# Patient Record
Sex: FEMALE | Race: WHITE | URBAN - METROPOLITAN AREA
[De-identification: names, ages, dates, MRNs, and addresses within clinical notes are randomized per-mention and may not be internally consistent; named-entity substitution may affect disease eponyms.]

---

## 2017-09-22 ENCOUNTER — INPATIENT (INPATIENT)
Facility: HOSPITAL | Age: 82
LOS: 4 days | Discharge: HOME | DRG: 379 | End: 2017-09-27
Attending: INTERNAL MEDICINE | Admitting: INTERNAL MEDICINE
Payer: MEDICARE

## 2017-09-22 VITALS
RESPIRATION RATE: 18 BRPM | WEIGHT: 125 LBS | SYSTOLIC BLOOD PRESSURE: 173 MMHG | TEMPERATURE: 98 F | HEART RATE: 76 BPM | OXYGEN SATURATION: 96 % | HEIGHT: 62 IN | DIASTOLIC BLOOD PRESSURE: 77 MMHG

## 2017-09-22 RX ORDER — SODIUM CHLORIDE 9 MG/ML
1000 INJECTION INTRAMUSCULAR; INTRAVENOUS; SUBCUTANEOUS ONCE
Qty: 0 | Refills: 0 | Status: COMPLETED | OUTPATIENT
Start: 2017-09-22 | End: 2017-09-22

## 2017-09-22 RX ORDER — SODIUM CHLORIDE 9 MG/ML
3 INJECTION INTRAMUSCULAR; INTRAVENOUS; SUBCUTANEOUS ONCE
Qty: 0 | Refills: 0 | Status: COMPLETED | OUTPATIENT
Start: 2017-09-22 | End: 2017-09-22

## 2017-09-23 DIAGNOSIS — I48.91 UNSPECIFIED ATRIAL FIBRILLATION: ICD-10-CM

## 2017-09-23 DIAGNOSIS — I25.118 ATHEROSCLEROTIC HEART DISEASE OF NATIVE CORONARY ARTERY WITH OTHER FORMS OF ANGINA PECTORIS: ICD-10-CM

## 2017-09-23 DIAGNOSIS — I10 ESSENTIAL (PRIMARY) HYPERTENSION: ICD-10-CM

## 2017-09-23 DIAGNOSIS — K62.5 HEMORRHAGE OF ANUS AND RECTUM: ICD-10-CM

## 2017-09-23 DIAGNOSIS — K92.2 GASTROINTESTINAL HEMORRHAGE, UNSPECIFIED: ICD-10-CM

## 2017-09-23 DIAGNOSIS — K57.91 DIVERTICULOSIS OF INTESTINE, PART UNSPECIFIED, WITHOUT PERFORATION OR ABSCESS WITH BLEEDING: ICD-10-CM

## 2017-09-23 DIAGNOSIS — I25.10 ATHEROSCLEROTIC HEART DISEASE OF NATIVE CORONARY ARTERY WITHOUT ANGINA PECTORIS: ICD-10-CM

## 2017-09-23 DIAGNOSIS — E11.8 TYPE 2 DIABETES MELLITUS WITH UNSPECIFIED COMPLICATIONS: ICD-10-CM

## 2017-09-23 LAB
ALBUMIN SERPL ELPH-MCNC: 3.4 G/DL — SIGNIFICANT CHANGE UP (ref 3.3–5)
ALP SERPL-CCNC: 59 U/L — SIGNIFICANT CHANGE UP (ref 30–120)
ALT FLD-CCNC: 21 U/L DA — SIGNIFICANT CHANGE UP (ref 10–60)
ANION GAP SERPL CALC-SCNC: 11 MMOL/L — SIGNIFICANT CHANGE UP (ref 5–17)
ANION GAP SERPL CALC-SCNC: 9 MMOL/L — SIGNIFICANT CHANGE UP (ref 5–17)
APPEARANCE UR: CLEAR — SIGNIFICANT CHANGE UP
APTT BLD: 45.8 SEC — HIGH (ref 27.5–37.4)
AST SERPL-CCNC: 16 U/L — SIGNIFICANT CHANGE UP (ref 10–40)
BASOPHILS # BLD AUTO: 0.1 K/UL — SIGNIFICANT CHANGE UP (ref 0–0.2)
BASOPHILS NFR BLD AUTO: 1.5 % — SIGNIFICANT CHANGE UP (ref 0–2)
BILIRUB SERPL-MCNC: 0.4 MG/DL — SIGNIFICANT CHANGE UP (ref 0.2–1.2)
BILIRUB UR-MCNC: NEGATIVE — SIGNIFICANT CHANGE UP
BUN SERPL-MCNC: 27 MG/DL — HIGH (ref 7–23)
BUN SERPL-MCNC: 33 MG/DL — HIGH (ref 7–23)
CALCIUM SERPL-MCNC: 8.7 MG/DL — SIGNIFICANT CHANGE UP (ref 8.4–10.5)
CALCIUM SERPL-MCNC: 9.1 MG/DL — SIGNIFICANT CHANGE UP (ref 8.4–10.5)
CHLORIDE SERPL-SCNC: 104 MMOL/L — SIGNIFICANT CHANGE UP (ref 96–108)
CHLORIDE SERPL-SCNC: 107 MMOL/L — SIGNIFICANT CHANGE UP (ref 96–108)
CO2 SERPL-SCNC: 24 MMOL/L — SIGNIFICANT CHANGE UP (ref 22–31)
CO2 SERPL-SCNC: 25 MMOL/L — SIGNIFICANT CHANGE UP (ref 22–31)
COLOR SPEC: YELLOW — SIGNIFICANT CHANGE UP
CREAT SERPL-MCNC: 0.82 MG/DL — SIGNIFICANT CHANGE UP (ref 0.5–1.3)
CREAT SERPL-MCNC: 1.09 MG/DL — SIGNIFICANT CHANGE UP (ref 0.5–1.3)
DIFF PNL FLD: ABNORMAL
EOSINOPHIL # BLD AUTO: 0.2 K/UL — SIGNIFICANT CHANGE UP (ref 0–0.5)
EOSINOPHIL NFR BLD AUTO: 2 % — SIGNIFICANT CHANGE UP (ref 0–6)
GLUCOSE SERPL-MCNC: 137 MG/DL — HIGH (ref 70–99)
GLUCOSE SERPL-MCNC: 198 MG/DL — HIGH (ref 70–99)
GLUCOSE UR QL: NEGATIVE MG/DL — SIGNIFICANT CHANGE UP
HCT VFR BLD CALC: 33 % — LOW (ref 34.5–45)
HCT VFR BLD CALC: 34.3 % — LOW (ref 34.5–45)
HCT VFR BLD CALC: 36.2 % — SIGNIFICANT CHANGE UP (ref 34.5–45)
HCT VFR BLD CALC: 38.7 % — SIGNIFICANT CHANGE UP (ref 34.5–45)
HGB BLD-MCNC: 11.3 G/DL — LOW (ref 11.5–15.5)
HGB BLD-MCNC: 11.3 G/DL — LOW (ref 11.5–15.5)
HGB BLD-MCNC: 11.9 G/DL — SIGNIFICANT CHANGE UP (ref 11.5–15.5)
HGB BLD-MCNC: 12.7 G/DL — SIGNIFICANT CHANGE UP (ref 11.5–15.5)
INR BLD: 2.62 RATIO — HIGH (ref 0.88–1.16)
INR BLD: 2.79 RATIO — HIGH (ref 0.88–1.16)
KETONES UR-MCNC: NEGATIVE — SIGNIFICANT CHANGE UP
LEUKOCYTE ESTERASE UR-ACNC: ABNORMAL
LIDOCAIN IGE QN: 289 U/L — SIGNIFICANT CHANGE UP (ref 73–393)
LYMPHOCYTES # BLD AUTO: 2.9 K/UL — SIGNIFICANT CHANGE UP (ref 1–3.3)
LYMPHOCYTES # BLD AUTO: 33.9 % — SIGNIFICANT CHANGE UP (ref 13–44)
MAGNESIUM SERPL-MCNC: 2.1 MG/DL — SIGNIFICANT CHANGE UP (ref 1.6–2.6)
MCHC RBC-ENTMCNC: 31.4 PG — SIGNIFICANT CHANGE UP (ref 27–34)
MCHC RBC-ENTMCNC: 31.6 PG — SIGNIFICANT CHANGE UP (ref 27–34)
MCHC RBC-ENTMCNC: 31.7 PG — SIGNIFICANT CHANGE UP (ref 27–34)
MCHC RBC-ENTMCNC: 32.1 PG — SIGNIFICANT CHANGE UP (ref 27–34)
MCHC RBC-ENTMCNC: 32.7 GM/DL — SIGNIFICANT CHANGE UP (ref 32–36)
MCHC RBC-ENTMCNC: 32.9 GM/DL — SIGNIFICANT CHANGE UP (ref 32–36)
MCHC RBC-ENTMCNC: 33.1 GM/DL — SIGNIFICANT CHANGE UP (ref 32–36)
MCHC RBC-ENTMCNC: 34.2 GM/DL — SIGNIFICANT CHANGE UP (ref 32–36)
MCV RBC AUTO: 94 FL — SIGNIFICANT CHANGE UP (ref 80–100)
MCV RBC AUTO: 95.9 FL — SIGNIFICANT CHANGE UP (ref 80–100)
MCV RBC AUTO: 96 FL — SIGNIFICANT CHANGE UP (ref 80–100)
MCV RBC AUTO: 96.1 FL — SIGNIFICANT CHANGE UP (ref 80–100)
MONOCYTES # BLD AUTO: 0.6 K/UL — SIGNIFICANT CHANGE UP (ref 0–0.9)
MONOCYTES NFR BLD AUTO: 7.1 % — SIGNIFICANT CHANGE UP (ref 2–14)
NEUTROPHILS # BLD AUTO: 4.8 K/UL — SIGNIFICANT CHANGE UP (ref 1.8–7.4)
NEUTROPHILS NFR BLD AUTO: 55.4 % — SIGNIFICANT CHANGE UP (ref 43–77)
NITRITE UR-MCNC: NEGATIVE — SIGNIFICANT CHANGE UP
PH UR: 6.5 — SIGNIFICANT CHANGE UP (ref 5–8)
PLATELET # BLD AUTO: 230 K/UL — SIGNIFICANT CHANGE UP (ref 150–400)
PLATELET # BLD AUTO: 240 K/UL — SIGNIFICANT CHANGE UP (ref 150–400)
PLATELET # BLD AUTO: 254 K/UL — SIGNIFICANT CHANGE UP (ref 150–400)
PLATELET # BLD AUTO: 257 K/UL — SIGNIFICANT CHANGE UP (ref 150–400)
POTASSIUM SERPL-MCNC: 3.8 MMOL/L — SIGNIFICANT CHANGE UP (ref 3.5–5.3)
POTASSIUM SERPL-MCNC: 3.8 MMOL/L — SIGNIFICANT CHANGE UP (ref 3.5–5.3)
POTASSIUM SERPL-SCNC: 3.8 MMOL/L — SIGNIFICANT CHANGE UP (ref 3.5–5.3)
POTASSIUM SERPL-SCNC: 3.8 MMOL/L — SIGNIFICANT CHANGE UP (ref 3.5–5.3)
PROT SERPL-MCNC: 7 G/DL — SIGNIFICANT CHANGE UP (ref 6–8.3)
PROT UR-MCNC: 30 MG/DL
PROTHROM AB SERPL-ACNC: 29.1 SEC — HIGH (ref 9.8–12.7)
PROTHROM AB SERPL-ACNC: 31 SEC — HIGH (ref 9.8–12.7)
RBC # BLD: 3.51 M/UL — LOW (ref 3.8–5.2)
RBC # BLD: 3.57 M/UL — LOW (ref 3.8–5.2)
RBC # BLD: 3.76 M/UL — LOW (ref 3.8–5.2)
RBC # BLD: 4.04 M/UL — SIGNIFICANT CHANGE UP (ref 3.8–5.2)
RBC # FLD: 12 % — SIGNIFICANT CHANGE UP (ref 10.3–14.5)
RBC # FLD: 12.1 % — SIGNIFICANT CHANGE UP (ref 10.3–14.5)
RBC # FLD: 12.1 % — SIGNIFICANT CHANGE UP (ref 10.3–14.5)
RBC # FLD: 12.3 % — SIGNIFICANT CHANGE UP (ref 10.3–14.5)
SODIUM SERPL-SCNC: 139 MMOL/L — SIGNIFICANT CHANGE UP (ref 135–145)
SODIUM SERPL-SCNC: 141 MMOL/L — SIGNIFICANT CHANGE UP (ref 135–145)
SP GR SPEC: 1.01 — SIGNIFICANT CHANGE UP (ref 1.01–1.02)
UROBILINOGEN FLD QL: NEGATIVE MG/DL — SIGNIFICANT CHANGE UP
WBC # BLD: 8.3 K/UL — SIGNIFICANT CHANGE UP (ref 3.8–10.5)
WBC # BLD: 8.4 K/UL — SIGNIFICANT CHANGE UP (ref 3.8–10.5)
WBC # BLD: 8.6 K/UL — SIGNIFICANT CHANGE UP (ref 3.8–10.5)
WBC # BLD: 9.7 K/UL — SIGNIFICANT CHANGE UP (ref 3.8–10.5)
WBC # FLD AUTO: 8.3 K/UL — SIGNIFICANT CHANGE UP (ref 3.8–10.5)
WBC # FLD AUTO: 8.4 K/UL — SIGNIFICANT CHANGE UP (ref 3.8–10.5)
WBC # FLD AUTO: 8.6 K/UL — SIGNIFICANT CHANGE UP (ref 3.8–10.5)
WBC # FLD AUTO: 9.7 K/UL — SIGNIFICANT CHANGE UP (ref 3.8–10.5)

## 2017-09-23 PROCEDURE — 99223 1ST HOSP IP/OBS HIGH 75: CPT

## 2017-09-23 PROCEDURE — 99285 EMERGENCY DEPT VISIT HI MDM: CPT

## 2017-09-23 PROCEDURE — 71010: CPT | Mod: 26

## 2017-09-23 PROCEDURE — 93010 ELECTROCARDIOGRAM REPORT: CPT

## 2017-09-23 RX ORDER — METOPROLOL TARTRATE 50 MG
5 TABLET ORAL ONCE
Qty: 0 | Refills: 0 | Status: COMPLETED | OUTPATIENT
Start: 2017-09-23 | End: 2017-09-23

## 2017-09-23 RX ORDER — DEXTROSE 50 % IN WATER 50 %
25 SYRINGE (ML) INTRAVENOUS ONCE
Qty: 0 | Refills: 0 | Status: DISCONTINUED | OUTPATIENT
Start: 2017-09-23 | End: 2017-09-27

## 2017-09-23 RX ORDER — PANTOPRAZOLE SODIUM 20 MG/1
40 TABLET, DELAYED RELEASE ORAL EVERY 12 HOURS
Qty: 0 | Refills: 0 | Status: DISCONTINUED | OUTPATIENT
Start: 2017-09-23 | End: 2017-09-26

## 2017-09-23 RX ORDER — INSULIN LISPRO 100/ML
VIAL (ML) SUBCUTANEOUS AT BEDTIME
Qty: 0 | Refills: 0 | Status: DISCONTINUED | OUTPATIENT
Start: 2017-09-23 | End: 2017-09-27

## 2017-09-23 RX ORDER — INFLUENZA VIRUS VACCINE 15; 15; 15; 15 UG/.5ML; UG/.5ML; UG/.5ML; UG/.5ML
0.5 SUSPENSION INTRAMUSCULAR ONCE
Qty: 0 | Refills: 0 | Status: COMPLETED | OUTPATIENT
Start: 2017-09-23 | End: 2017-09-23

## 2017-09-23 RX ORDER — METOPROLOL TARTRATE 50 MG
5 TABLET ORAL EVERY 6 HOURS
Qty: 0 | Refills: 0 | Status: DISCONTINUED | OUTPATIENT
Start: 2017-09-23 | End: 2017-09-24

## 2017-09-23 RX ORDER — METOPROLOL TARTRATE 50 MG
25 TABLET ORAL DAILY
Qty: 0 | Refills: 0 | Status: DISCONTINUED | OUTPATIENT
Start: 2017-09-23 | End: 2017-09-23

## 2017-09-23 RX ORDER — INSULIN LISPRO 100/ML
VIAL (ML) SUBCUTANEOUS
Qty: 0 | Refills: 0 | Status: DISCONTINUED | OUTPATIENT
Start: 2017-09-23 | End: 2017-09-27

## 2017-09-23 RX ORDER — AMLODIPINE BESYLATE 2.5 MG/1
5 TABLET ORAL ONCE
Qty: 0 | Refills: 0 | Status: COMPLETED | OUTPATIENT
Start: 2017-09-23 | End: 2017-09-23

## 2017-09-23 RX ORDER — THYROID 120 MG
15 TABLET ORAL DAILY
Qty: 0 | Refills: 0 | Status: DISCONTINUED | OUTPATIENT
Start: 2017-09-23 | End: 2017-09-27

## 2017-09-23 RX ORDER — SODIUM CHLORIDE 9 MG/ML
1000 INJECTION, SOLUTION INTRAVENOUS
Qty: 0 | Refills: 0 | Status: DISCONTINUED | OUTPATIENT
Start: 2017-09-23 | End: 2017-09-27

## 2017-09-23 RX ORDER — DEXTROSE 50 % IN WATER 50 %
12.5 SYRINGE (ML) INTRAVENOUS ONCE
Qty: 0 | Refills: 0 | Status: DISCONTINUED | OUTPATIENT
Start: 2017-09-23 | End: 2017-09-27

## 2017-09-23 RX ORDER — LISINOPRIL 2.5 MG/1
10 TABLET ORAL DAILY
Qty: 0 | Refills: 0 | Status: DISCONTINUED | OUTPATIENT
Start: 2017-09-23 | End: 2017-09-23

## 2017-09-23 RX ORDER — GLUCAGON INJECTION, SOLUTION 0.5 MG/.1ML
1 INJECTION, SOLUTION SUBCUTANEOUS ONCE
Qty: 0 | Refills: 0 | Status: DISCONTINUED | OUTPATIENT
Start: 2017-09-23 | End: 2017-09-27

## 2017-09-23 RX ORDER — DEXTROSE 50 % IN WATER 50 %
1 SYRINGE (ML) INTRAVENOUS ONCE
Qty: 0 | Refills: 0 | Status: DISCONTINUED | OUTPATIENT
Start: 2017-09-23 | End: 2017-09-27

## 2017-09-23 RX ORDER — SODIUM CHLORIDE 9 MG/ML
1000 INJECTION INTRAMUSCULAR; INTRAVENOUS; SUBCUTANEOUS
Qty: 0 | Refills: 0 | Status: DISCONTINUED | OUTPATIENT
Start: 2017-09-23 | End: 2017-09-26

## 2017-09-23 RX ADMIN — Medication 5 MILLIGRAM(S): at 13:00

## 2017-09-23 RX ADMIN — SODIUM CHLORIDE 3 MILLILITER(S): 9 INJECTION INTRAMUSCULAR; INTRAVENOUS; SUBCUTANEOUS at 00:58

## 2017-09-23 RX ADMIN — Medication 2: at 12:35

## 2017-09-23 RX ADMIN — SODIUM CHLORIDE 1000 MILLILITER(S): 9 INJECTION INTRAMUSCULAR; INTRAVENOUS; SUBCUTANEOUS at 00:58

## 2017-09-23 RX ADMIN — SODIUM CHLORIDE 100 MILLILITER(S): 9 INJECTION INTRAMUSCULAR; INTRAVENOUS; SUBCUTANEOUS at 01:59

## 2017-09-23 RX ADMIN — Medication 5 MILLIGRAM(S): at 19:04

## 2017-09-23 RX ADMIN — Medication: at 01:15

## 2017-09-23 RX ADMIN — Medication 25 MILLIGRAM(S): at 08:58

## 2017-09-23 RX ADMIN — Medication 15 MILLIGRAM(S): at 06:06

## 2017-09-23 RX ADMIN — AMLODIPINE BESYLATE 5 MILLIGRAM(S): 2.5 TABLET ORAL at 02:48

## 2017-09-23 RX ADMIN — SODIUM CHLORIDE 100 MILLILITER(S): 9 INJECTION INTRAMUSCULAR; INTRAVENOUS; SUBCUTANEOUS at 16:57

## 2017-09-23 RX ADMIN — LISINOPRIL 10 MILLIGRAM(S): 2.5 TABLET ORAL at 06:06

## 2017-09-23 RX ADMIN — PANTOPRAZOLE SODIUM 40 MILLIGRAM(S): 20 TABLET, DELAYED RELEASE ORAL at 06:06

## 2017-09-23 RX ADMIN — PANTOPRAZOLE SODIUM 40 MILLIGRAM(S): 20 TABLET, DELAYED RELEASE ORAL at 19:30

## 2017-09-23 NOTE — CONSULT NOTE ADULT - PROBLEM SELECTOR RECOMMENDATION 9
will continue cardizem drip , will IV lopressor q 6 hours , if BP permits    AC on hold due bleeding will continue cardizem drip , will IV lopressor q 6 hours , if BP permits    will hold AC if hemoglobin drops significantly

## 2017-09-23 NOTE — ED PROVIDER NOTE - CHPI ED SYMPTOMS NEG
no burning urination/no dysuria/no nausea/no diarrhea/no fever/no abdominal distension/no chills/no vomiting

## 2017-09-23 NOTE — CONSULT NOTE ADULT - SUBJECTIVE AND OBJECTIVE BOX
REASON FOR CONSULT: Afib with rapid rate    CHIEF COMPLAINT: Lower gi bleeding     HPI:  This is an 84 F with PMH of HTN hypothyroid CAD 2003 PCI , chronic  a fib 2010  on coumadin diverticulosis who came in c/o rectal bleeding. Patient was in her usual state of health up until last when she was sitting and felt something wet but saw that she was passing blood clots rectally. She denies an abd pain, dizziness, lightheadedness, n/v/constipation, CP, HILLIARD, SOB, fevers and chills. She did feel palpitations at times yesterday. She reports having 6 small loose BMs on 9/19/17 (Tuesday) with scant blood. She attributed it to a stomach virus which her daughter and her grandchildren were having. Those symptoms had resolved by the next day. She has had some bleeding while she's been in the hospital, but less than at home. Her last colonoscopy was in 2009. She reports never being constipated and rarely straining. (23 Sep 2017 10:08). Patient blood work showed mild drop in hemoglobin , her heart rate increased  , received IV Cardizem, low normal BP  .  Patient denies any chest pain or shortness of breath , Patient had recent nuclear stress test  in june was normal as per patient   Dr brendan talavera       PAST MEDICAL & SURGICAL HISTORY:  Cancer of ureter  MI (myocardial infarction)  DM (diabetes mellitus)  Afib  Hypertension      Allergies    No Known Allergies    Intolerances        SOCIAL HISTORY: no smoking     FAMILY HISTORY: not contributory      MEDICATIONS:  MEDICATIONS  (STANDING):  lisinopril 10 milliGRAM(s) Oral daily  metoprolol succinate ER 25 milliGRAM(s) Oral daily  thyroid 15 milliGRAM(s) Oral daily  pantoprazole  Injectable 40 milliGRAM(s) IV Push every 12 hours  sodium chloride 0.9%. 1000 milliLiter(s) (100 mL/Hr) IV Continuous <Continuous>  insulin lispro (HumaLOG) corrective regimen sliding scale   SubCutaneous three times a day before meals  insulin lispro (HumaLOG) corrective regimen sliding scale   SubCutaneous at bedtime  dextrose 5%. 1000 milliLiter(s) (50 mL/Hr) IV Continuous <Continuous>  dextrose 50% Injectable 12.5 Gram(s) IV Push once  dextrose 50% Injectable 25 Gram(s) IV Push once  dextrose 50% Injectable 25 Gram(s) IV Push once  influenza   Vaccine 0.5 milliLiter(s) IntraMuscular once  diltiazem Infusion 5 mG/Hr (5 mL/Hr) IV Continuous <Continuous>    MEDICATIONS  (PRN):  dextrose Gel 1 Dose(s) Oral once PRN Blood Glucose LESS THAN 70 milliGRAM(s)/deciLiter  glucagon  Injectable 1 milliGRAM(s) IntraMuscular once PRN Glucose <70 milliGRAM(s)/deciLiter      REVIEW OF SYSTEMS:    CONSTITUTIONAL: No weakness, fevers or chills  EYES/ENT: No visual changes;  No vertigo or throat pain   NECK: No pain or stiffness  RESPIRATORY: No cough, wheezing, hemoptysis; No shortness of breath  CARDIOVASCULAR: No chest pain or palpitations  GASTROINTESTINAL: No abdominal or epigastric pain. No nausea, vomiting, or hematemesis; No diarrhea or constipation. No melena or hematochezia.  GENITOURINARY: No dysuria, frequency or hematuria  NEUROLOGICAL: No numbness or weakness  SKIN: No itching, burning, rashes, or lesions   All other review of systems is negative unless indicated above    Vital Signs Last 24 Hrs  T(C): 36.7 (23 Sep 2017 10:53), Max: 36.8 (23 Sep 2017 08:45)  T(F): 98.1 (23 Sep 2017 10:53), Max: 98.3 (23 Sep 2017 09:37)  HR: 83 (23 Sep 2017 10:53) (67 - 106)  BP: 98/62 (23 Sep 2017 10:53) (98/62 - 198/71)  BP(mean): --  RR: 18 (23 Sep 2017 10:53) (17 - 18)  SpO2: 100% (23 Sep 2017 10:53) (96% - 100%)    I&O's Summary    22 Sep 2017 07:01  -  23 Sep 2017 07:00  --------------------------------------------------------  IN: 600 mL / OUT: 0 mL / NET: 600 mL        PHYSICAL EXAM:    Constitutional: NAD, awake and alert, well-developed  HEENT: PERR, EOMI,  No oral cyananosis.  Neck:  supple,  No JVD  Respiratory: Breath sounds are clear bilaterally, No wheezing, rales or rhonchi  Cardiovascular: S1 and S2, irregularly irregular mild rapid ventricular rate   Gastrointestinal: Bowel Sounds present, soft, nontender.   Extremities: No peripheral edema. No clubbing or cyanosis.  Vascular: 2+ peripheral pulses  Neurological: A/O x 3, no focal deficits  Musculoskeletal: no calf tenderness.  Skin: No rashes.      LABS: All Labs Reviewed:                        11.3   9.7   )-----------( 230      ( 23 Sep 2017 06:31 )             34.3                         12.7   8.6   )-----------( 254      ( 23 Sep 2017 00:16 )             38.7     23 Sep 2017 06:31    141    |  107    |  27     ----------------------------<  137    3.8     |  25     |  0.82   23 Sep 2017 00:16    139    |  104    |  33     ----------------------------<  198    3.8     |  24     |  1.09     Ca    8.7        23 Sep 2017 06:31  Ca    9.1        23 Sep 2017 00:16  Mg     2.1       23 Sep 2017 06:31    TPro  7.0    /  Alb  3.4    /  TBili  0.4    /  DBili  x      /  AST  16     /  ALT  21     /  AlkPhos  59     23 Sep 2017 00:16    PT/INR - ( 23 Sep 2017 06:31 )   PT: 31.0 sec;   INR: 2.79 ratio         PTT - ( 23 Sep 2017 00:16 )  PTT:45.8 sec      Blood Culture:         RADIOLOGY/EKG:  Normal sinus rhythm  70 BPM ,    Monitor  afib with rapid ventricular rate

## 2017-09-23 NOTE — H&P ADULT - PROBLEM SELECTOR PLAN 1
admit to tele  clears  IVF  iv PPI  serial cbcs  transfuse prn  GI consult  hold asa and coumadin for now

## 2017-09-23 NOTE — CONSULT NOTE ADULT - SUBJECTIVE AND OBJECTIVE BOX
Chief Complaint:  Patient is a 84y old  Female who presents with a chief complaint of rectal bleeding (23 Sep 2017 10:08)      HPI:  84F with history of afib on coumadin (since ), cad s/p percutaneous coronary intervention in  on asa who presents with rectal bleeding  no prior history of significant gi bleed  last colonoscopy in   she has not passed any blood per rectum since admission  she is on cardizem gtt for rapid afib      Allergies:  No Known Allergies      Medications:  thyroid 15 milliGRAM(s) Oral daily  pantoprazole  Injectable 40 milliGRAM(s) IV Push every 12 hours  sodium chloride 0.9%. 1000 milliLiter(s) IV Continuous <Continuous>  insulin lispro (HumaLOG) corrective regimen sliding scale   SubCutaneous three times a day before meals  insulin lispro (HumaLOG) corrective regimen sliding scale   SubCutaneous at bedtime  dextrose 5%. 1000 milliLiter(s) IV Continuous <Continuous>  dextrose Gel 1 Dose(s) Oral once PRN  dextrose 50% Injectable 12.5 Gram(s) IV Push once  dextrose 50% Injectable 25 Gram(s) IV Push once  dextrose 50% Injectable 25 Gram(s) IV Push once  glucagon  Injectable 1 milliGRAM(s) IntraMuscular once PRN  influenza   Vaccine 0.5 milliLiter(s) IntraMuscular once  metoprolol Injectable 5 milliGRAM(s) IV Push every 6 hours  metoprolol Injectable 5 milliGRAM(s) IV Push once  diltiazem Infusion 2.5 mG/Hr IV Continuous <Continuous>      PMHX/PSHX:  Cancer of ureter  MI (myocardial infarction)  DM (diabetes mellitus)  Afib  Hypertension      Family history:      Social History:     ROS:     General:  No wt loss, fevers, chills, night sweats, fatigue,   Eyes:  Good vision, no reported pain  ENT:  No sore throat, pain, runny nose, dysphagia  CV:  No pain, palpitations, hypo/hypertension  Resp:  No dyspnea, cough, tachypnea, wheezing  GI:  No pain, No nausea, No vomiting, No diarrhea, No constipation, No weight loss, No fever, No pruritis, + rectal bleeding, No tarry stools, No dysphagia,  :  No pain, bleeding, incontinence, nocturia  Muscle:  No pain, weakness  Neuro:  No weakness, tingling, memory problems  Psych:  No fatigue, insomnia, mood problems, depression  Endocrine:  No polyuria, polydipsia, cold/heat intolerance  Heme:  No petechiae, ecchymosis, easy bruisability  Skin:  No rash, tattoos, scars, edema      PHYSICAL EXAM:   Vital Signs:  Vital Signs Last 24 Hrs  T(C): 36.6 (23 Sep 2017 12:03), Max: 36.8 (23 Sep 2017 08:45)  T(F): 97.9 (23 Sep 2017 12:03), Max: 98.3 (23 Sep 2017 09:37)  HR: 95 (23 Sep 2017 12:03) (67 - 106)  BP: 131/62 (23 Sep 2017 12:03) (98/62 - 198/71)  BP(mean): --  RR: 18 (23 Sep 2017 12:03) (17 - 18)  SpO2: 97% (23 Sep 2017 12:03) (96% - 100%)  Daily Height in cm: 157.48 (22 Sep 2017 23:47)    Daily Weight in k.9 (23 Sep 2017 01:45)    GENERAL:  Appears stated age, well-groomed, well-nourished, no distress  HEENT:  NC/AT,  conjunctivae clear and pink, no thyromegaly, nodules, adenopathy, no JVD, sclera -anicteric  CHEST:  Full & symmetric excursion, no increased effort, breath sounds clear  HEART:  Regular rhythm, S1, S2, no murmur/rub/S3/S4, no abdominal bruit, no edema  ABDOMEN:  Soft, non-tender, non-distended, normoactive bowel sounds,  no masses ,no hepato-splenomegaly, no signs of chronic liver disease  EXTEREMITIES:  no cyanosis,clubbing or edema  SKIN:  No rash/erythema/ecchymoses/petechiae/wounds/abscess/warm/dry  NEURO:  Alert, oriented, no asterixis, no tremor, no encephalopathy    LABS:                        11.9   8.3   )-----------( 240      ( 23 Sep 2017 11:46 )             36.2         141  |  107  |  27<H>  ----------------------------<  137<H>  3.8   |  25  |  0.82    Ca    8.7      23 Sep 2017 06:31  Mg     2.1         TPro  7.0  /  Alb  3.4  /  TBili  0.4  /  DBili  x   /  AST  16  /  ALT  21  /  AlkPhos  59      LIVER FUNCTIONS - ( 23 Sep 2017 00:16 )  Alb: 3.4 g/dL / Pro: 7.0 g/dL / ALK PHOS: 59 U/L / ALT: 21 U/L DA / AST: 16 U/L / GGT: x           PT/INR - ( 23 Sep 2017 06:31 )   PT: 31.0 sec;   INR: 2.79 ratio         PTT - ( 23 Sep 2017 00:16 )  PTT:45.8 sec  Urinalysis Basic - ( 23 Sep 2017 03:08 )    Color: Yellow / Appearance: Clear / S.010 / pH: x  Gluc: x / Ketone: Negative  / Bili: Negative / Urobili: Negative mg/dL   Blood: x / Protein: 30 mg/dL / Nitrite: Negative   Leuk Esterase: Small / RBC: 25-50 /HPF / WBC 3-5   Sq Epi: x / Non Sq Epi: x / Bacteria: Few      Amylase Serum--      Lipase [id number]       Ammonia--      Imaging:

## 2017-09-23 NOTE — H&P ADULT - GASTROINTESTINAL DETAILS
soft/no distention/nontender/normal/bowel sounds normal/no guarding/no rebound tenderness/no rigidity

## 2017-09-23 NOTE — CONSULT NOTE ADULT - PROBLEM SELECTOR RECOMMENDATION 9
continue clear liquid diet  hemoglobin appears stable  likely resolving diverticular bleed  will need colonoscopy once heart rate controlled and cleared by cardiology

## 2017-09-23 NOTE — ED PROVIDER NOTE - OBJECTIVE STATEMENT
83yo female bib ems with rectal bleeding. pt states she was sitting on the couch and felt something wet and had bloody BM with clots, no pain, no fever, chills, no vomiting, pt is on coumadin but has not had it checked in over a month

## 2017-09-23 NOTE — H&P ADULT - HISTORY OF PRESENT ILLNESS
This is an 84 F with PMH of HTN hypothyroid CAD a fib on coumadin diverticulosis who came in c/o rectal bleeding. Patient was in her usual state of health up until last when she was sitting and felt something wet but saw that she was passing blood clots rectally. She denies an abd pain, dizziness, lightheadedness, n/v/constipation, CP, HILLIARD, SOB, fevers and chills. She did feel palpitations at times yesterday. She reports having 6 small loose BMs on 9/19/17 (Tuesday) with scant blood. She attributed it to a stomach virus which her daughter and her grandchildren were having. Those symptoms had resolved by the next day. She has had some bleeding while she's been in the hospital, but less than at home. Her last colonoscopy was in 2009. She reports never being constipated and rarely straining.

## 2017-09-23 NOTE — CONSULT NOTE ADULT - PROBLEM SELECTOR RECOMMENDATION 3
hold coumadin  no need to reverse INR unless significant bleeding  colonoscopy once cleared by cardiology and off cardizem drip

## 2017-09-23 NOTE — CONSULT NOTE ADULT - PROBLEM SELECTOR RECOMMENDATION 2
without angina , normal recent stress test as per patient , stable ,    will obtain echo , stress test report from her cardiologist

## 2017-09-24 LAB
ANION GAP SERPL CALC-SCNC: 7 MMOL/L — SIGNIFICANT CHANGE UP (ref 5–17)
BUN SERPL-MCNC: 18 MG/DL — SIGNIFICANT CHANGE UP (ref 7–23)
CALCIUM SERPL-MCNC: 8.5 MG/DL — SIGNIFICANT CHANGE UP (ref 8.4–10.5)
CHLORIDE SERPL-SCNC: 110 MMOL/L — HIGH (ref 96–108)
CO2 SERPL-SCNC: 25 MMOL/L — SIGNIFICANT CHANGE UP (ref 22–31)
CREAT SERPL-MCNC: 0.93 MG/DL — SIGNIFICANT CHANGE UP (ref 0.5–1.3)
GLUCOSE SERPL-MCNC: 122 MG/DL — HIGH (ref 70–99)
HCT VFR BLD CALC: 33.7 % — LOW (ref 34.5–45)
HCT VFR BLD CALC: 35.9 % — SIGNIFICANT CHANGE UP (ref 34.5–45)
HGB BLD-MCNC: 11.2 G/DL — LOW (ref 11.5–15.5)
HGB BLD-MCNC: 11.5 G/DL — SIGNIFICANT CHANGE UP (ref 11.5–15.5)
INR BLD: 2.49 RATIO — HIGH (ref 0.88–1.16)
MCHC RBC-ENTMCNC: 31.2 PG — SIGNIFICANT CHANGE UP (ref 27–34)
MCHC RBC-ENTMCNC: 31.8 PG — SIGNIFICANT CHANGE UP (ref 27–34)
MCHC RBC-ENTMCNC: 32 GM/DL — SIGNIFICANT CHANGE UP (ref 32–36)
MCHC RBC-ENTMCNC: 33.1 GM/DL — SIGNIFICANT CHANGE UP (ref 32–36)
MCV RBC AUTO: 96.1 FL — SIGNIFICANT CHANGE UP (ref 80–100)
MCV RBC AUTO: 97.4 FL — SIGNIFICANT CHANGE UP (ref 80–100)
PLATELET # BLD AUTO: 236 K/UL — SIGNIFICANT CHANGE UP (ref 150–400)
PLATELET # BLD AUTO: 240 K/UL — SIGNIFICANT CHANGE UP (ref 150–400)
POTASSIUM SERPL-MCNC: 3.9 MMOL/L — SIGNIFICANT CHANGE UP (ref 3.5–5.3)
POTASSIUM SERPL-SCNC: 3.9 MMOL/L — SIGNIFICANT CHANGE UP (ref 3.5–5.3)
PROTHROM AB SERPL-ACNC: 27.6 SEC — HIGH (ref 9.8–12.7)
RBC # BLD: 3.51 M/UL — LOW (ref 3.8–5.2)
RBC # BLD: 3.68 M/UL — LOW (ref 3.8–5.2)
RBC # FLD: 12.2 % — SIGNIFICANT CHANGE UP (ref 10.3–14.5)
RBC # FLD: 12.5 % — SIGNIFICANT CHANGE UP (ref 10.3–14.5)
SODIUM SERPL-SCNC: 142 MMOL/L — SIGNIFICANT CHANGE UP (ref 135–145)
TROPONIN I SERPL-MCNC: 0.02 NG/ML — SIGNIFICANT CHANGE UP (ref 0.02–0.06)
WBC # BLD: 6.8 K/UL — SIGNIFICANT CHANGE UP (ref 3.8–10.5)
WBC # BLD: 7.2 K/UL — SIGNIFICANT CHANGE UP (ref 3.8–10.5)
WBC # FLD AUTO: 6.8 K/UL — SIGNIFICANT CHANGE UP (ref 3.8–10.5)
WBC # FLD AUTO: 7.2 K/UL — SIGNIFICANT CHANGE UP (ref 3.8–10.5)

## 2017-09-24 PROCEDURE — 99233 SBSQ HOSP IP/OBS HIGH 50: CPT

## 2017-09-24 RX ORDER — METOPROLOL TARTRATE 50 MG
2.5 TABLET ORAL EVERY 6 HOURS
Qty: 0 | Refills: 0 | Status: DISCONTINUED | OUTPATIENT
Start: 2017-09-24 | End: 2017-09-26

## 2017-09-24 RX ORDER — PHYTONADIONE (VIT K1) 5 MG
2.5 TABLET ORAL ONCE
Qty: 0 | Refills: 0 | Status: COMPLETED | OUTPATIENT
Start: 2017-09-24 | End: 2017-09-24

## 2017-09-24 RX ADMIN — Medication 2.5 MILLIGRAM(S): at 17:27

## 2017-09-24 RX ADMIN — PANTOPRAZOLE SODIUM 40 MILLIGRAM(S): 20 TABLET, DELAYED RELEASE ORAL at 17:27

## 2017-09-24 RX ADMIN — PANTOPRAZOLE SODIUM 40 MILLIGRAM(S): 20 TABLET, DELAYED RELEASE ORAL at 05:35

## 2017-09-24 RX ADMIN — Medication 15 MILLIGRAM(S): at 05:34

## 2017-09-24 RX ADMIN — SODIUM CHLORIDE 100 MILLILITER(S): 9 INJECTION INTRAMUSCULAR; INTRAVENOUS; SUBCUTANEOUS at 05:34

## 2017-09-24 RX ADMIN — Medication 2.5 MILLIGRAM(S): at 12:42

## 2017-09-25 LAB
HCT VFR BLD CALC: 35.5 % — SIGNIFICANT CHANGE UP (ref 34.5–45)
HGB BLD-MCNC: 11.6 G/DL — SIGNIFICANT CHANGE UP (ref 11.5–15.5)
INR BLD: 1.57 RATIO — HIGH (ref 0.88–1.16)
MCHC RBC-ENTMCNC: 31.9 PG — SIGNIFICANT CHANGE UP (ref 27–34)
MCHC RBC-ENTMCNC: 32.8 GM/DL — SIGNIFICANT CHANGE UP (ref 32–36)
MCV RBC AUTO: 97.5 FL — SIGNIFICANT CHANGE UP (ref 80–100)
PLATELET # BLD AUTO: 233 K/UL — SIGNIFICANT CHANGE UP (ref 150–400)
PROTHROM AB SERPL-ACNC: 17.3 SEC — HIGH (ref 9.8–12.7)
RBC # BLD: 3.64 M/UL — LOW (ref 3.8–5.2)
RBC # FLD: 12.2 % — SIGNIFICANT CHANGE UP (ref 10.3–14.5)
WBC # BLD: 6.6 K/UL — SIGNIFICANT CHANGE UP (ref 3.8–10.5)
WBC # FLD AUTO: 6.6 K/UL — SIGNIFICANT CHANGE UP (ref 3.8–10.5)

## 2017-09-25 PROCEDURE — 71010: CPT | Mod: 26

## 2017-09-25 PROCEDURE — 99233 SBSQ HOSP IP/OBS HIGH 50: CPT

## 2017-09-25 RX ORDER — SOD SULF/SODIUM/NAHCO3/KCL/PEG
1000 SOLUTION, RECONSTITUTED, ORAL ORAL EVERY 4 HOURS
Qty: 0 | Refills: 0 | Status: COMPLETED | OUTPATIENT
Start: 2017-09-25 | End: 2017-09-25

## 2017-09-25 RX ADMIN — PANTOPRAZOLE SODIUM 40 MILLIGRAM(S): 20 TABLET, DELAYED RELEASE ORAL at 06:46

## 2017-09-25 RX ADMIN — Medication 2.5 MILLIGRAM(S): at 17:21

## 2017-09-25 RX ADMIN — Medication 1000 MILLILITER(S): at 16:46

## 2017-09-25 RX ADMIN — Medication 20 MILLIGRAM(S): at 20:59

## 2017-09-25 RX ADMIN — Medication 1: at 08:12

## 2017-09-25 RX ADMIN — Medication 15 MILLIGRAM(S): at 06:46

## 2017-09-25 RX ADMIN — PANTOPRAZOLE SODIUM 40 MILLIGRAM(S): 20 TABLET, DELAYED RELEASE ORAL at 17:21

## 2017-09-25 RX ADMIN — Medication 2.5 MILLIGRAM(S): at 13:21

## 2017-09-26 ENCOUNTER — TRANSCRIPTION ENCOUNTER (OUTPATIENT)
Age: 82
End: 2017-09-26

## 2017-09-26 LAB
ALBUMIN SERPL ELPH-MCNC: 3.5 G/DL — SIGNIFICANT CHANGE UP (ref 3.3–5)
ALP SERPL-CCNC: 61 U/L — SIGNIFICANT CHANGE UP (ref 30–120)
ALT FLD-CCNC: 23 U/L DA — SIGNIFICANT CHANGE UP (ref 10–60)
ANION GAP SERPL CALC-SCNC: 12 MMOL/L — SIGNIFICANT CHANGE UP (ref 5–17)
AST SERPL-CCNC: 22 U/L — SIGNIFICANT CHANGE UP (ref 10–40)
BILIRUB SERPL-MCNC: 0.7 MG/DL — SIGNIFICANT CHANGE UP (ref 0.2–1.2)
BUN SERPL-MCNC: 9 MG/DL — SIGNIFICANT CHANGE UP (ref 7–23)
CALCIUM SERPL-MCNC: 8.6 MG/DL — SIGNIFICANT CHANGE UP (ref 8.4–10.5)
CHLORIDE SERPL-SCNC: 109 MMOL/L — HIGH (ref 96–108)
CO2 SERPL-SCNC: 25 MMOL/L — SIGNIFICANT CHANGE UP (ref 22–31)
CREAT SERPL-MCNC: 0.74 MG/DL — SIGNIFICANT CHANGE UP (ref 0.5–1.3)
GLUCOSE SERPL-MCNC: 134 MG/DL — HIGH (ref 70–99)
HCT VFR BLD CALC: 34.2 % — LOW (ref 34.5–45)
HGB BLD-MCNC: 11.5 G/DL — SIGNIFICANT CHANGE UP (ref 11.5–15.5)
INR BLD: 1.22 RATIO — HIGH (ref 0.88–1.16)
MCHC RBC-ENTMCNC: 32.6 PG — SIGNIFICANT CHANGE UP (ref 27–34)
MCHC RBC-ENTMCNC: 33.7 GM/DL — SIGNIFICANT CHANGE UP (ref 32–36)
MCV RBC AUTO: 96.9 FL — SIGNIFICANT CHANGE UP (ref 80–100)
PLATELET # BLD AUTO: 237 K/UL — SIGNIFICANT CHANGE UP (ref 150–400)
POTASSIUM SERPL-MCNC: 3.9 MMOL/L — SIGNIFICANT CHANGE UP (ref 3.5–5.3)
POTASSIUM SERPL-SCNC: 3.9 MMOL/L — SIGNIFICANT CHANGE UP (ref 3.5–5.3)
PROT SERPL-MCNC: 6.3 G/DL — SIGNIFICANT CHANGE UP (ref 6–8.3)
PROTHROM AB SERPL-ACNC: 13.4 SEC — HIGH (ref 9.8–12.7)
RBC # BLD: 3.53 M/UL — LOW (ref 3.8–5.2)
RBC # FLD: 12.2 % — SIGNIFICANT CHANGE UP (ref 10.3–14.5)
SODIUM SERPL-SCNC: 146 MMOL/L — HIGH (ref 135–145)
WBC # BLD: 8.3 K/UL — SIGNIFICANT CHANGE UP (ref 3.8–10.5)
WBC # FLD AUTO: 8.3 K/UL — SIGNIFICANT CHANGE UP (ref 3.8–10.5)

## 2017-09-26 PROCEDURE — 99232 SBSQ HOSP IP/OBS MODERATE 35: CPT

## 2017-09-26 RX ORDER — METOPROLOL TARTRATE 50 MG
50 TABLET ORAL EVERY 12 HOURS
Qty: 0 | Refills: 0 | Status: DISCONTINUED | OUTPATIENT
Start: 2017-09-26 | End: 2017-09-27

## 2017-09-26 RX ORDER — SODIUM CHLORIDE 9 MG/ML
1000 INJECTION, SOLUTION INTRAVENOUS
Qty: 0 | Refills: 0 | Status: DISCONTINUED | OUTPATIENT
Start: 2017-09-26 | End: 2017-09-26

## 2017-09-26 RX ORDER — PANTOPRAZOLE SODIUM 20 MG/1
40 TABLET, DELAYED RELEASE ORAL
Qty: 0 | Refills: 0 | Status: DISCONTINUED | OUTPATIENT
Start: 2017-09-26 | End: 2017-09-27

## 2017-09-26 RX ORDER — LISINOPRIL 2.5 MG/1
10 TABLET ORAL DAILY
Qty: 0 | Refills: 0 | Status: DISCONTINUED | OUTPATIENT
Start: 2017-09-26 | End: 2017-09-27

## 2017-09-26 RX ORDER — WARFARIN SODIUM 2.5 MG/1
5 TABLET ORAL ONCE
Qty: 0 | Refills: 0 | Status: COMPLETED | OUTPATIENT
Start: 2017-09-26 | End: 2017-09-26

## 2017-09-26 RX ORDER — PIOGLITAZONE HYDROCHLORIDE 15 MG/1
15 TABLET ORAL DAILY
Qty: 0 | Refills: 0 | Status: DISCONTINUED | OUTPATIENT
Start: 2017-09-26 | End: 2017-09-27

## 2017-09-26 RX ADMIN — Medication 2.5 MILLIGRAM(S): at 00:21

## 2017-09-26 RX ADMIN — Medication 2.5 MILLIGRAM(S): at 12:36

## 2017-09-26 RX ADMIN — Medication 2.5 MILLIGRAM(S): at 05:06

## 2017-09-26 RX ADMIN — PIOGLITAZONE HYDROCHLORIDE 15 MILLIGRAM(S): 15 TABLET ORAL at 17:18

## 2017-09-26 RX ADMIN — Medication 50 MILLIGRAM(S): at 17:18

## 2017-09-26 RX ADMIN — LISINOPRIL 10 MILLIGRAM(S): 2.5 TABLET ORAL at 17:18

## 2017-09-26 RX ADMIN — PANTOPRAZOLE SODIUM 40 MILLIGRAM(S): 20 TABLET, DELAYED RELEASE ORAL at 05:07

## 2017-09-26 RX ADMIN — WARFARIN SODIUM 5 MILLIGRAM(S): 2.5 TABLET ORAL at 20:54

## 2017-09-26 RX ADMIN — Medication 15 MILLIGRAM(S): at 05:07

## 2017-09-26 RX ADMIN — SODIUM CHLORIDE 100 MILLILITER(S): 9 INJECTION, SOLUTION INTRAVENOUS at 11:52

## 2017-09-26 NOTE — PROGRESS NOTE ADULT - PROBLEM SELECTOR PROBLEM 1
Atrial fibrillation with rapid ventricular response
Atrial fibrillation with rapid ventricular response
Gastrointestinal hemorrhage associated with anorectal source
Gastrointestinal hemorrhage associated with anorectal source
Gastrointestinal hemorrhage associated with intestinal diverticulosis
Gastrointestinal hemorrhage associated with intestinal diverticulosis
Atrial fibrillation with rapid ventricular response
Gastrointestinal hemorrhage associated with anorectal source

## 2017-09-26 NOTE — PROGRESS NOTE ADULT - PROBLEM SELECTOR PLAN 1
continue tele  clears  IVF  iv PPI  serial cbcs  transfuse prn  GI consult  hold asa and coumadin for now  h/h is stable  May proceed for colonoscopy once cleared by cardio
? etiology -- ? diveticular/anorectal disease/AVM/malignancy  continue clear liquid diet, npo past midnight for colonoscopy tomorrow morning  cardiology evaluation appreciated  monitor h/h, transfuse as needed  check INR in am
continue clears  cbc daily   INR remains elevated will order vitamin K 2.5mg times one now  hopefully it will improve and tentative colonoscopy on tuesday  cardiology evaluation appreciated
continue tele  clears  IVF  iv PPI  serial cbcs  transfuse prn  GI consult  hold asa and coumadin for now  h/h is stable  May proceed for colonoscopy once cleared by cardio
controlled rate , continue Iv lopressor , change to po once she starts po .  restart on anticoagulation after colonoscopy.        Patient is stable for low risk procedure
controlled rate ,with episodes of slow ventricular rate , will decrease lopressor to 2.5 mg        Patient is stable for low risk procedure
s/p colonoscopy -- poor prep, + ticks, + hemorrhoids, no active bleeding  po PPI  may resume coumadin  h/h is stable
controlled rate ,with episodes of slow ventricular rate , will decrease lopressor to 2.5 mg        Patient is stable for low risk procedure

## 2017-09-26 NOTE — PROGRESS NOTE ADULT - PROVIDER SPECIALTY LIST ADULT
Cardiology
Gastroenterology
Gastroenterology
Internal Medicine

## 2017-09-26 NOTE — PROGRESS NOTE ADULT - ASSESSMENT
This is an 84 F comes with BRBPR and MARLEE
This is an 84 F comes with BRBPR and MARLEE
cardiology PROBLEM:  Coronary artery disease with other form of angina pectoris: Coronary artery disease with other form of angina pectoris  Gastrointestinal hemorrhage associated with intestinal diverticulosis: Gastrointestinal hemorrhage associated with intestinal diverticulosis  GI bleed: GI bleed  Coronary artery disease: Coronary artery disease  Type 2 diabetes mellitus with complication, without long-term current use of insulin: Type 2 diabetes mellitus with complication, without long-term current use of insulin  Essential hypertension: Essential hypertension  Coronary artery disease, angina presence unspecified, unspecified vessel or lesion type, unspecified whether native or transplanted heart: Coronary artery disease, angina presence unspecified, unspecified vessel or lesion type, unspecified whether native or transplanted heart  Atrial fibrillation with rapid ventricular response: Atrial fibrillation with rapid ventricular response  Gastrointestinal hemorrhage associated with anorectal source: Gastrointestinal hemorrhage associated with anorectal source      Assessment      ·  Problem: Atrial fibrillation with rapid ventricular response.   controlled rate ,with episodes of slow ventricular rate    will decrease lopressor to 2.5 mg  Patient is stable for low risk procedure.  ·  Problem: Coronary artery disease.   NOo evidence of CHF   stable , without active symptoms.       ·  Problem: GI bleed.   stable hemoglobin level , as per GI.   colonoscopy pending  stable for procedure  low to intermediate risk  ARAGON SCORE <0.5%
This is an 84 F comes with BRBPR and MARLEE

## 2017-09-26 NOTE — DIETITIAN INITIAL EVALUATION ADULT. - OTHER INFO
85 y/o F c PMH of HTN, hypothyroidism, CAD, Afib, DM, diverticulosis admitted for rectal bleeding. Pt had a colonoscopy in the early afternoon which was incomplete 2/2 fecal matter present (states she was having diarrhea after the exam). Informed pt of FDI c coumadin and vitamin K to which she responded that she monitors her intake of greens/roughage. Pt also expressed that she is very conscious of her fat/cholesterol, sugar, and salt intake. S/p incomplete colonoscopy, pt advanced to DASH, TLC and intake should be monitored moving forward. Pt reports taking B12 & Vitamin D supplements PTA. Goal is for pt to consume >75% meals.

## 2017-09-26 NOTE — PROGRESS NOTE ADULT - PROBLEM SELECTOR PLAN 2
continue lopressor  cardio consult  cardizem prn  check tfts
continue lopressor  cardio consult  cardizem prn  check tfts
resume po meds  cardio consult  iv cardizem prn
stable , without active symptoms

## 2017-09-26 NOTE — PROCEDURE NOTE - ADDITIONAL PROCEDURE DETAILS
Findings:  - multiple small mouthed diverticula without active bleeding or old blood  - moderate sized internal hemorrhoids  - abundant solid fecal material  - incomplete colonoscopy    Plan:  - monitor h/h  - high fiber diet  - anusol supp.  - repeat colonoscopy as outpatient with extended preparation  - colorectal surgery evaluation for hemorrhoids as outpatient

## 2017-09-26 NOTE — PROGRESS NOTE ADULT - PROBLEM SELECTOR PLAN 3
hold asa for now  continue lopressor
hold asa for now  continue lopressor
resume po meds
stable hemoglobin level , as per GI

## 2017-09-26 NOTE — PROGRESS NOTE ADULT - SUBJECTIVE AND OBJECTIVE BOX
Patient is a 84y old  Female who presents with a chief complaint of rectal bleeding (23 Sep 2017 10:08)      INTERVAL HPI/OVERNIGHT EVENTS: Patient seen and examined. NAD. No complaints.      Vital Signs Last 24 Hrs  T(C): 36.3 (25 Sep 2017 10:10), Max: 36.8 (25 Sep 2017 05:00)  T(F): 97.4 (25 Sep 2017 10:10), Max: 98.3 (25 Sep 2017 05:00)  HR: 70 (25 Sep 2017 10:10) (60 - 77)  BP: 170/67 (25 Sep 2017 10:10) (144/57 - 173/66)  BP(mean): --  RR: 16 (25 Sep 2017 10:10) (16 - 18)  SpO2: 95% (25 Sep 2017 10:10) (95% - 98%)I&O's Summary    24 Sep 2017 07:01  -  25 Sep 2017 07:00  --------------------------------------------------------  IN: 1940 mL / OUT: 0 mL / NET: 1940 mL        LABS:                        11.6   6.6   )-----------( 233      ( 25 Sep 2017 07:12 )             35.5     09-24    142  |  110<H>  |  18  ----------------------------<  122<H>  3.9   |  25  |  0.93    Ca    8.5      24 Sep 2017 07:28      PT/INR - ( 25 Sep 2017 07:12 )   PT: 17.3 sec;   INR: 1.57 ratio             CAPILLARY BLOOD GLUCOSE  196 (25 Sep 2017 08:10)  124 (24 Sep 2017 21:01)  130 (24 Sep 2017 17:05)  121 (24 Sep 2017 12:02)              thyroid 15 milliGRAM(s) Oral daily  pantoprazole  Injectable 40 milliGRAM(s) IV Push every 12 hours  sodium chloride 0.9%. 1000 milliLiter(s) IV Continuous <Continuous>  insulin lispro (HumaLOG) corrective regimen sliding scale   SubCutaneous three times a day before meals  insulin lispro (HumaLOG) corrective regimen sliding scale   SubCutaneous at bedtime  dextrose 5%. 1000 milliLiter(s) IV Continuous <Continuous>  dextrose Gel 1 Dose(s) Oral once PRN  dextrose 50% Injectable 12.5 Gram(s) IV Push once  dextrose 50% Injectable 25 Gram(s) IV Push once  dextrose 50% Injectable 25 Gram(s) IV Push once  glucagon  Injectable 1 milliGRAM(s) IntraMuscular once PRN  influenza   Vaccine 0.5 milliLiter(s) IntraMuscular once  metoprolol Injectable 2.5 milliGRAM(s) IV Push every 6 hours      REVIEW OF SYSTEMS:  CONSTITUTIONAL: No fever, weight loss, or fatigue  NECK: No pain or stiffness  RESPIRATORY: No cough, wheezing, chills or hemoptysis; No shortness of breath  CARDIOVASCULAR: No chest pain, palpitations, dizziness, or leg swelling  GASTROINTESTINAL: No abdominal or epigastric pain. No nausea, vomiting, or hematemesis; No diarrhea or constipation. No melena or hematochezia.  GENITOURINARY: No dysuria, frequency, hematuria, or incontinence  NEUROLOGICAL: No headaches, loss of strength, numbness, or tremors  SKIN: No itching, burning  MUSCULOSKELETAL: No joint pain or swelling; No muscle, back, or extremity pain  PSYCHIATRIC: No depression, mood swings, HEME/LYMPH: No easy bruising, or bleeding gums  ALLERY AND IMMUNOLOGIC: No hives       Consultant(s) Notes Reviewed:  [x ] YES  [ ] NO    PHYSICAL EXAM:  GENERAL: NAD, well-groomed, well-developed  HEAD:  Atraumatic, Normocephalic  EYES: EOMI, PERRLA, conjunctiva and sclera clear  ENMT: No tonsillar erythema, exudates, or enlargement; Moist mucous membranes  NECK: Supple, No JVD  NERVOUS SYSTEM:  Awake & alert  CHEST/LUNG: Clear to auscultation bilaterally; No rales, rhonchi, wheezing,  HEART: Regular rate and rhythm  ABDOMEN: Soft, Nontender, Nondistended; Bowel sounds present  EXTREMITIES:  No clubbing, cyanosis, or edema  LYMPH: No lymphadenopathy noted  SKIN: No rashes      Advanced care planning discussed with patient/family [x ] YES   [ ] NO
Patient is a 84y old  Female who presents with a chief complaint of rectal bleeding (23 Sep 2017 10:08)      INTERVAL HPI/OVERNIGHT EVENTS: Patient seen and examined. NAD. No complaints.      Vital Signs Last 24 Hrs  T(C): 36.8 (26 Sep 2017 12:30), Max: 36.9 (25 Sep 2017 17:05)  T(F): 98.2 (26 Sep 2017 12:30), Max: 98.4 (25 Sep 2017 17:05)  HR: 68 (26 Sep 2017 12:30) (56 - 101)  BP: 170/64 (26 Sep 2017 12:30) (106/40 - 183/64)  BP(mean): --  RR: 16 (26 Sep 2017 12:30) (15 - 18)  SpO2: 98% (26 Sep 2017 12:30) (93% - 100%)I&O's Summary    25 Sep 2017 07:01  -  26 Sep 2017 07:00  --------------------------------------------------------  IN: 1200 mL / OUT: 0 mL / NET: 1200 mL    26 Sep 2017 07:01  -  26 Sep 2017 14:11  --------------------------------------------------------  IN: 820 mL / OUT: 0 mL / NET: 820 mL        LABS:                        11.5   8.3   )-----------( 237      ( 26 Sep 2017 07:27 )             34.2     09-26    146<H>  |  109<H>  |  9   ----------------------------<  134<H>  3.9   |  25  |  0.74    Ca    8.6      26 Sep 2017 08:15    TPro  6.3  /  Alb  3.5  /  TBili  0.7  /  DBili  x   /  AST  22  /  ALT  23  /  AlkPhos  61  09-26    PT/INR - ( 26 Sep 2017 07:27 )   PT: 13.4 sec;   INR: 1.22 ratio             CAPILLARY BLOOD GLUCOSE  146 (26 Sep 2017 12:00)  121 (26 Sep 2017 08:00)  121 (26 Sep 2017 00:39)  185 (25 Sep 2017 21:02)  114 (25 Sep 2017 17:05)              thyroid 15 milliGRAM(s) Oral daily  insulin lispro (HumaLOG) corrective regimen sliding scale   SubCutaneous three times a day before meals  insulin lispro (HumaLOG) corrective regimen sliding scale   SubCutaneous at bedtime  dextrose 5%. 1000 milliLiter(s) IV Continuous <Continuous>  dextrose Gel 1 Dose(s) Oral once PRN  dextrose 50% Injectable 12.5 Gram(s) IV Push once  dextrose 50% Injectable 25 Gram(s) IV Push once  dextrose 50% Injectable 25 Gram(s) IV Push once  glucagon  Injectable 1 milliGRAM(s) IntraMuscular once PRN  influenza   Vaccine 0.5 milliLiter(s) IntraMuscular once  metoprolol Injectable 2.5 milliGRAM(s) IV Push every 6 hours  pantoprazole    Tablet 40 milliGRAM(s) Oral before breakfast  warfarin 5 milliGRAM(s) Oral once      REVIEW OF SYSTEMS:  CONSTITUTIONAL: No fever, weight loss, or fatigue  NECK: No pain or stiffness  RESPIRATORY: No cough, wheezing, chills or hemoptysis; No shortness of breath  CARDIOVASCULAR: No chest pain, palpitations, dizziness, or leg swelling  GASTROINTESTINAL: No abdominal or epigastric pain. No nausea, vomiting, or hematemesis; No diarrhea or constipation. No melena or hematochezia.  GENITOURINARY: No dysuria, frequency, hematuria, or incontinence  NEUROLOGICAL: No headaches, loss of strength, numbness, or tremors  SKIN: No itching, burning  MUSCULOSKELETAL: No joint pain or swelling; No muscle, back, or extremity pain  PSYCHIATRIC: No depression, mood swings, HEME/LYMPH: No easy bruising, or bleeding gums  ALLERY AND IMMUNOLOGIC: No hives       Consultant(s) Notes Reviewed:  [ x] YES  [ ] NO    PHYSICAL EXAM:  GENERAL: NAD, well-groomed, well-developed  HEAD:  Atraumatic, Normocephalic  EYES: EOMI, PERRLA, conjunctiva and sclera clear  ENMT: No tonsillar erythema, exudates, or enlargement; Moist mucous membranes  NECK: Supple, No JVD  NERVOUS SYSTEM:  Awake & alert  CHEST/LUNG: Clear to auscultation bilaterally; No rales, rhonchi, wheezing,  HEART: Regular rate and rhythm  ABDOMEN: Soft, Nontender, Nondistended; Bowel sounds present  EXTREMITIES:  No clubbing, cyanosis, or edema  LYMPH: No lymphadenopathy noted  SKIN: No rashes      Advanced care planning discussed with patient/family [ x] YES   [ ] NO
INTERVAL HPI/OVERNIGHT EVENTS:  decreased quantity of blood in stools    MEDICATIONS  (STANDING):  thyroid 15 milliGRAM(s) Oral daily  pantoprazole  Injectable 40 milliGRAM(s) IV Push every 12 hours  sodium chloride 0.9%. 1000 milliLiter(s) (100 mL/Hr) IV Continuous <Continuous>  insulin lispro (HumaLOG) corrective regimen sliding scale   SubCutaneous three times a day before meals  insulin lispro (HumaLOG) corrective regimen sliding scale   SubCutaneous at bedtime  dextrose 5%. 1000 milliLiter(s) (50 mL/Hr) IV Continuous <Continuous>  dextrose 50% Injectable 12.5 Gram(s) IV Push once  dextrose 50% Injectable 25 Gram(s) IV Push once  dextrose 50% Injectable 25 Gram(s) IV Push once  influenza   Vaccine 0.5 milliLiter(s) IntraMuscular once  metoprolol Injectable 2.5 milliGRAM(s) IV Push every 6 hours  phytonadione   Solution 2.5 milliGRAM(s) Oral once    MEDICATIONS  (PRN):  dextrose Gel 1 Dose(s) Oral once PRN Blood Glucose LESS THAN 70 milliGRAM(s)/deciLiter  glucagon  Injectable 1 milliGRAM(s) IntraMuscular once PRN Glucose <70 milliGRAM(s)/deciLiter      Allergies    No Known Allergies    Intolerances        ROS:   General:  No wt loss, fevers, chills, night sweats, fatigue,   Eyes:  Good vision, no reported pain  ENT:  No sore throat, pain, runny nose, dysphagia  CV:  No pain, palpitations, hypo/hypertension  Resp:  No dyspnea, cough, tachypnea, wheezing  GI:  No pain, No nausea, No vomiting, No diarrhea, No constipation, No weight loss, No fever, No pruritis, + rectal bleeding, No tarry stools, No dysphagia,  :  No pain, bleeding, incontinence, nocturia  Muscle:  No pain, weakness  Neuro:  No weakness, tingling, memory problems  Psych:  No fatigue, insomnia, mood problems, depression  Endocrine:  No polyuria, polydipsia, cold/heat intolerance  Heme:  No petechiae, ecchymosis, easy bruisability  Skin:  No rash, tattoos, scars, edema      PHYSICAL EXAM:   Vital Signs:  Vital Signs Last 24 Hrs  T(C): 36.6 (24 Sep 2017 10:34), Max: 36.8 (23 Sep 2017 16:59)  T(F): 97.8 (24 Sep 2017 10:34), Max: 98.3 (23 Sep 2017 16:59)  HR: 62 (24 Sep 2017 10:34) (50 - 102)  BP: 143/61 (24 Sep 2017 10:34) (112/57 - 143/61)  BP(mean): --  RR: 18 (24 Sep 2017 10:34) (16 - 18)  SpO2: 100% (24 Sep 2017 10:34) (99% - 100%)  Daily     Daily     GENERAL:  Appears stated age, well-groomed, well-nourished, no distress  HEENT:  NC/AT,  conjunctivae clear and pink, no thyromegaly, nodules, adenopathy, no JVD, sclera -anicteric  CHEST:  Full & symmetric excursion, no increased effort, breath sounds clear  HEART:  Regular rhythm, S1, S2, no murmur/rub/S3/S4, no abdominal bruit, no edema  ABDOMEN:  Soft, non-tender, non-distended, normoactive bowel sounds,  no masses ,no hepato-splenomegaly, no signs of chronic liver disease  EXTEREMITIES:  no cyanosis,clubbing or edema  SKIN:  No rash/erythema/ecchymoses/petechiae/wounds/abscess/warm/dry  NEURO:  Alert, oriented, no asterixis, no tremor, no encephalopathy      LABS:                        11.5   6.8   )-----------( 236      ( 24 Sep 2017 07:28 )             35.9     09-24    142  |  110<H>  |  18  ----------------------------<  122<H>  3.9   |  25  |  0.93    Ca    8.5      24 Sep 2017 07:28  Mg     2.1     -    TPro  7.0  /  Alb  3.4  /  TBili  0.4  /  DBili  x   /  AST  16  /  ALT  21  /  AlkPhos  59  09-23    PT/INR - ( 24 Sep 2017 07:28 )   PT: 27.6 sec;   INR: 2.49 ratio         PTT - ( 23 Sep 2017 00:16 )  PTT:45.8 sec  Urinalysis Basic - ( 23 Sep 2017 03:08 )    Color: Yellow / Appearance: Clear / S.010 / pH: x  Gluc: x / Ketone: Negative  / Bili: Negative / Urobili: Negative mg/dL   Blood: x / Protein: 30 mg/dL / Nitrite: Negative   Leuk Esterase: Small / RBC: 25-50 /HPF / WBC 3-5   Sq Epi: x / Non Sq Epi: x / Bacteria: Few        RADIOLOGY & ADDITIONAL TESTS:
INTERVAL HPI/OVERNIGHT EVENTS:  still having blood in the stool  HR better controlled    MEDICATIONS  (STANDING):  thyroid 15 milliGRAM(s) Oral daily  pantoprazole  Injectable 40 milliGRAM(s) IV Push every 12 hours  sodium chloride 0.9%. 1000 milliLiter(s) (100 mL/Hr) IV Continuous <Continuous>  insulin lispro (HumaLOG) corrective regimen sliding scale   SubCutaneous three times a day before meals  insulin lispro (HumaLOG) corrective regimen sliding scale   SubCutaneous at bedtime  dextrose 5%. 1000 milliLiter(s) (50 mL/Hr) IV Continuous <Continuous>  dextrose 50% Injectable 12.5 Gram(s) IV Push once  dextrose 50% Injectable 25 Gram(s) IV Push once  dextrose 50% Injectable 25 Gram(s) IV Push once  influenza   Vaccine 0.5 milliLiter(s) IntraMuscular once  metoprolol Injectable 2.5 milliGRAM(s) IV Push every 6 hours  phytonadione   Solution 2.5 milliGRAM(s) Oral once    MEDICATIONS  (PRN):  dextrose Gel 1 Dose(s) Oral once PRN Blood Glucose LESS THAN 70 milliGRAM(s)/deciLiter  glucagon  Injectable 1 milliGRAM(s) IntraMuscular once PRN Glucose <70 milliGRAM(s)/deciLiter      Allergies    No Known Allergies    Intolerances        ROS:   General:  No wt loss, fevers, chills, night sweats, fatigue,   Eyes:  Good vision, no reported pain  ENT:  No sore throat, pain, runny nose, dysphagia  CV:  No pain, palpitations, hypo/hypertension  Resp:  No dyspnea, cough, tachypnea, wheezing  GI:  No pain, No nausea, No vomiting, No diarrhea, No constipation, No weight loss, No fever, No pruritis, + rectal bleeding, No tarry stools, No dysphagia,  :  No pain, bleeding, incontinence, nocturia  Muscle:  No pain, weakness  Neuro:  No weakness, tingling, memory problems  Psych:  No fatigue, insomnia, mood problems, depression  Endocrine:  No polyuria, polydipsia, cold/heat intolerance  Heme:  No petechiae, ecchymosis, easy bruisability  Skin:  No rash, tattoos, scars, edema      PHYSICAL EXAM:   Vital Signs:  Vital Signs Last 24 Hrs  T(C): 36.6 (24 Sep 2017 10:34), Max: 36.8 (23 Sep 2017 16:59)  T(F): 97.8 (24 Sep 2017 10:34), Max: 98.3 (23 Sep 2017 16:59)  HR: 62 (24 Sep 2017 10:34) (50 - 102)  BP: 143/61 (24 Sep 2017 10:34) (112/57 - 143/61)  BP(mean): --  RR: 18 (24 Sep 2017 10:34) (16 - 18)  SpO2: 100% (24 Sep 2017 10:34) (99% - 100%)  Daily     Daily     GENERAL:  Appears stated age, well-groomed, well-nourished, no distress  HEENT:  NC/AT,  conjunctivae clear and pink, no thyromegaly, nodules, adenopathy, no JVD, sclera -anicteric  CHEST:  Full & symmetric excursion, no increased effort, breath sounds clear  HEART:  Regular rhythm, S1, S2, no murmur/rub/S3/S4, no abdominal bruit, no edema  ABDOMEN:  Soft, non-tender, non-distended, normoactive bowel sounds,  no masses ,no hepato-splenomegaly, no signs of chronic liver disease  EXTEREMITIES:  no cyanosis,clubbing or edema  SKIN:  No rash/erythema/ecchymoses/petechiae/wounds/abscess/warm/dry  NEURO:  Alert, oriented, no asterixis, no tremor, no encephalopathy      LABS:                        11.5   6.8   )-----------( 236      ( 24 Sep 2017 07:28 )             35.9     09-24    142  |  110<H>  |  18  ----------------------------<  122<H>  3.9   |  25  |  0.93    Ca    8.5      24 Sep 2017 07:28  Mg     2.1     -    TPro  7.0  /  Alb  3.4  /  TBili  0.4  /  DBili  x   /  AST  16  /  ALT  21  /  AlkPhos  59  09-23    PT/INR - ( 24 Sep 2017 07:28 )   PT: 27.6 sec;   INR: 2.49 ratio         PTT - ( 23 Sep 2017 00:16 )  PTT:45.8 sec  Urinalysis Basic - ( 23 Sep 2017 03:08 )    Color: Yellow / Appearance: Clear / S.010 / pH: x  Gluc: x / Ketone: Negative  / Bili: Negative / Urobili: Negative mg/dL   Blood: x / Protein: 30 mg/dL / Nitrite: Negative   Leuk Esterase: Small / RBC: 25-50 /HPF / WBC 3-5   Sq Epi: x / Non Sq Epi: x / Bacteria: Few        RADIOLOGY & ADDITIONAL TESTS:
REASON FOR CONSULT: Afib with rapid rate    CHIEF COMPLAINT: Lower gi bleeding     HPI:  This is an 84 F with PMH of HTN hypothyroid CAD  PCI , chronic  a fib   on coumadin diverticulosis who came in c/o rectal bleeding.   Patient was in her usual state of health up until last when she was sitting and felt something wet but saw that she was passing blood clots rectally. She denies an abd pain, dizziness, lightheadedness, n/v/constipation, CP, HILLIARD, SOB, fevers and chills. She did feel palpitations at times yesterday.   She reports having 6 small loose BMs on 17 (Tuesday) with scant blood. She attributed it to a stomach virus which her daughter and her grandchildren were having. Those symptoms had resolved by the next day. She has had some bleeding while she's been in the hospital, but less than at home.   Her last colonoscopy was in . She reports never being constipated and rarely straining. (23 Sep 2017 10:08). Patient blood work showed mild drop in hemoglobin , her heart rate increased  , received IV Cardizem, low normal BP  .  Patient denies any chest pain or shortness of breath , Patient had recent nuclear stress test  in  was normal as per patient   Dr brendan talavera   17 Patient is feeling better , heart rate is improved , has bradycardic episodes , did not reciev morning dose lopressor.  Patient did pass blood tinged stool , hemoglobin stable       PAST MEDICAL & SURGICAL HISTORY:  Cancer of ureter  MI (myocardial infarction)  DM (diabetes mellitus)  Afib  Hypertension      Allergies    No Known Allergies      MEDICATIONS  (STANDING):  thyroid 15 milliGRAM(s) Oral daily  pantoprazole  Injectable 40 milliGRAM(s) IV Push every 12 hours  sodium chloride 0.9%. 1000 milliLiter(s) (100 mL/Hr) IV Continuous <Continuous>  insulin lispro (HumaLOG) corrective regimen sliding scale   SubCutaneous three times a day before meals  insulin lispro (HumaLOG) corrective regimen sliding scale   SubCutaneous at bedtime  dextrose 5%. 1000 milliLiter(s) (50 mL/Hr) IV Continuous <Continuous>  dextrose 50% Injectable 12.5 Gram(s) IV Push once  dextrose 50% Injectable 25 Gram(s) IV Push once  dextrose 50% Injectable 25 Gram(s) IV Push once  influenza   Vaccine 0.5 milliLiter(s) IntraMuscular once  metoprolol Injectable 5 milliGRAM(s) IV Push every 6 hours    MEDICATIONS  (PRN):  dextrose Gel 1 Dose(s) Oral once PRN Blood Glucose LESS THAN 70 milliGRAM(s)/deciLiter  glucagon  Injectable 1 milliGRAM(s) IntraMuscular once PRN Glucose <70 milliGRAM(s)/deciLiter      T(C): 36.8 (25 Sep 2017 05:00), Max: 36.8 (25 Sep 2017 05:00)  T(F): 98.3 (25 Sep 2017 05:00), Max: 98.3 (25 Sep 2017 05:00)  HR: 65 (25 Sep 2017 05:00) (60 - 77)  BP: 173/66 (25 Sep 2017 05:00) (143/61 - 173/66)  BP(mean): --  ABP: --  ABP(mean): --  RR: 16 (25 Sep 2017 05:00) (16 - 18)  SpO2: 98% (25 Sep 2017 05:00) (96% - 100%)      I&O's Summary    23 Sep 2017 07:01  -  24 Sep 2017 07:00  --------------------------------------------------------  IN: 0 mL / OUT: 3 mL / NET: -3 mL    24 Sep 2017 07:01  -  24 Sep 2017 12:11  --------------------------------------------------------  IN: 420 mL / OUT: 0 mL / NET: 420 mL            PHYSICAL EXAM:    Constitutional: NAD, awake and alert, well-developed  HEENT: PERR, EOMI,  No oral cyananosis.  Neck:  supple,  No JVD  Respiratory: Breath sounds are clear bilaterally, No wheezing, rales or rhonchi  Cardiovascular: S1 and S2, irregularly irregular controlled rate  Gastrointestinal: Bowel Sounds present, soft, nontender.   Extremities: No peripheral edema. No clubbing or cyanosis.  Vascular: 2+ peripheral pulses  Neurological: A/O x 3, no focal deficits  Musculoskeletal: no calf tenderness.  Skin: No rashes.      LABS: All Labs Reviewed:                                    11.5   6.8   )-----------( 236      ( 24 Sep 2017 07:28 )             35.9     -    142  |  110<H>  |  18  ----------------------------<  122<H>  3.9   |  25  |  0.93    Ca    8.5      24 Sep 2017 07:28  Mg     2.1         TPro  7.0  /  Alb  3.4  /  TBili  0.4  /  DBili  x   /  AST  16  /  ALT  21  /  AlkPhos  59      CARDIAC MARKERS ( 24 Sep 2017 07:28 )  .018 ng/mL / x     / x     / x     / x          LIVER FUNCTIONS - ( 23 Sep 2017 00:16 )  Alb: 3.4 g/dL / Pro: 7.0 g/dL / ALK PHOS: 59 U/L / ALT: 21 U/L DA / AST: 16 U/L / GGT: x           PT/INR - ( 24 Sep 2017 07:28 )   PT: 27.6 sec;   INR: 2.49 ratio         PTT - ( 23 Sep 2017 00:16 )  PTT:45.8 sec  Urinalysis Basic - ( 23 Sep 2017 03:08 )    Color: Yellow / Appearance: Clear / S.010 / pH: x  Gluc: x / Ketone: Negative  / Bili: Negative / Urobili: Negative mg/dL   Blood: x / Protein: 30 mg/dL / Nitrite: Negative   Leuk Esterase: Small / RBC: 25-50 /HPF / WBC 3-5   Sq Epi: x / Non Sq Epi: x / Bacteria: Few            RADIOLOGY/EKG:  Normal sinus rhythm  70 BPM ,    Monitor  afib with rapid ventricular rate
REASON FOR CONSULT: Afib with rapid rate    CHIEF COMPLAINT: Lower gi bleeding     HPI:  This is an 84 F with PMH of HTN hypothyroid CAD  PCI , chronic  a fib   on coumadin diverticulosis who came in c/o rectal bleeding. Patient was in her usual state of health up until last when she was sitting and felt something wet but saw that she was passing blood clots rectally. She denies an abd pain, dizziness, lightheadedness, n/v/constipation, CP, HILLIARD, SOB, fevers and chills. She did feel palpitations at times yesterday. She reports having 6 small loose BMs on 17 (Tuesday) with scant blood. She attributed it to a stomach virus which her daughter and her grandchildren were having. Those symptoms had resolved by the next day. She has had some bleeding while she's been in the hospital, but less than at home. Her last colonoscopy was in . She reports never being constipated and rarely straining. (23 Sep 2017 10:08). Patient blood work showed mild drop in hemoglobin , her heart rate increased  , received IV Cardizem, low normal BP  .  Patient denies any chest pain or shortness of breath , Patient had recent nuclear stress test  in  was normal as per patient   Dr brendan talavera   17 Patient is feeling better , heart rate is improved , has bradycardic episodes , did not reciev morning dose lopressor.  Patient did pass blood tinged stool , hemoglobin stable       PAST MEDICAL & SURGICAL HISTORY:  Cancer of ureter  MI (myocardial infarction)  DM (diabetes mellitus)  Afib  Hypertension      Allergies    No Known Allergies    Intolerances      MEDICATIONS  (STANDING):  thyroid 15 milliGRAM(s) Oral daily  pantoprazole  Injectable 40 milliGRAM(s) IV Push every 12 hours  sodium chloride 0.9%. 1000 milliLiter(s) (100 mL/Hr) IV Continuous <Continuous>  insulin lispro (HumaLOG) corrective regimen sliding scale   SubCutaneous three times a day before meals  insulin lispro (HumaLOG) corrective regimen sliding scale   SubCutaneous at bedtime  dextrose 5%. 1000 milliLiter(s) (50 mL/Hr) IV Continuous <Continuous>  dextrose 50% Injectable 12.5 Gram(s) IV Push once  dextrose 50% Injectable 25 Gram(s) IV Push once  dextrose 50% Injectable 25 Gram(s) IV Push once  influenza   Vaccine 0.5 milliLiter(s) IntraMuscular once  metoprolol Injectable 5 milliGRAM(s) IV Push every 6 hours    MEDICATIONS  (PRN):  dextrose Gel 1 Dose(s) Oral once PRN Blood Glucose LESS THAN 70 milliGRAM(s)/deciLiter  glucagon  Injectable 1 milliGRAM(s) IntraMuscular once PRN Glucose <70 milliGRAM(s)/deciLiter      Vital Signs Last 24 Hrs  T(C): 36.6 (24 Sep 2017 10:34), Max: 36.8 (23 Sep 2017 16:59)  T(F): 97.8 (24 Sep 2017 10:34), Max: 98.3 (23 Sep 2017 16:59)  HR: 62 (24 Sep 2017 10:34) (50 - 102)  BP: 143/61 (24 Sep 2017 10:34) (112/57 - 143/61)  BP(mean): --  RR: 18 (24 Sep 2017 10:34) (16 - 18)  SpO2: 100% (24 Sep 2017 10:34) (99% - 100%)    I&O's Summary    23 Sep 2017 07:01  -  24 Sep 2017 07:00  --------------------------------------------------------  IN: 0 mL / OUT: 3 mL / NET: -3 mL    24 Sep 2017 07:01  -  24 Sep 2017 12:11  --------------------------------------------------------  IN: 420 mL / OUT: 0 mL / NET: 420 mL            PHYSICAL EXAM:    Constitutional: NAD, awake and alert, well-developed  HEENT: PERR, EOMI,  No oral cyananosis.  Neck:  supple,  No JVD  Respiratory: Breath sounds are clear bilaterally, No wheezing, rales or rhonchi  Cardiovascular: S1 and S2, irregularly irregular controlled rate  Gastrointestinal: Bowel Sounds present, soft, nontender.   Extremities: No peripheral edema. No clubbing or cyanosis.  Vascular: 2+ peripheral pulses  Neurological: A/O x 3, no focal deficits  Musculoskeletal: no calf tenderness.  Skin: No rashes.      LABS: All Labs Reviewed:                                    11.5   6.8   )-----------( 236      ( 24 Sep 2017 07:28 )             35.9     09-24    142  |  110<H>  |  18  ----------------------------<  122<H>  3.9   |  25  |  0.93    Ca    8.5      24 Sep 2017 07:28  Mg     2.1     -    TPro  7.0  /  Alb  3.4  /  TBili  0.4  /  DBili  x   /  AST  16  /  ALT  21  /  AlkPhos  59  23    CARDIAC MARKERS ( 24 Sep 2017 07:28 )  .018 ng/mL / x     / x     / x     / x          LIVER FUNCTIONS - ( 23 Sep 2017 00:16 )  Alb: 3.4 g/dL / Pro: 7.0 g/dL / ALK PHOS: 59 U/L / ALT: 21 U/L DA / AST: 16 U/L / GGT: x           PT/INR - ( 24 Sep 2017 07:28 )   PT: 27.6 sec;   INR: 2.49 ratio         PTT - ( 23 Sep 2017 00:16 )  PTT:45.8 sec  Urinalysis Basic - ( 23 Sep 2017 03:08 )    Color: Yellow / Appearance: Clear / S.010 / pH: x  Gluc: x / Ketone: Negative  / Bili: Negative / Urobili: Negative mg/dL   Blood: x / Protein: 30 mg/dL / Nitrite: Negative   Leuk Esterase: Small / RBC: 25-50 /HPF / WBC 3-5   Sq Epi: x / Non Sq Epi: x / Bacteria: Few            RADIOLOGY/EKG:  Normal sinus rhythm  70 BPM ,    Monitor  afib with rapid ventricular rate
REASON FOR CONSULT: Afib with rapid rate    CHIEF COMPLAINT: Lower gi bleeding     HPI:  This is an 84 F with PMH of HTN hypothyroid CAD 2003 PCI , chronic  a fib 2010  on coumadin diverticulosis who came in c/o rectal bleeding. Patient was in her usual state of health up until last when she was sitting and felt something wet but saw that she was passing blood clots rectally. She denies an abd pain, dizziness, lightheadedness, n/v/constipation, CP, HILLIARD, SOB, fevers and chills. She did feel palpitations at times yesterday. She reports having 6 small loose BMs on 9/19/17 (Tuesday) with scant blood. She attributed it to a stomach virus which her daughter and her grandchildren were having. Those symptoms had resolved by the next day. She has had some bleeding while she's been in the hospital, but less than at home. Her last colonoscopy was in 2009. She reports never being constipated and rarely straining. (23 Sep 2017 10:08). Patient blood work showed mild drop in hemoglobin , her heart rate increased  , received IV Cardizem, low normal BP  .  Patient denies any chest pain or shortness of breath , Patient had recent nuclear stress test  in june was normal as per patient   Dr brendan talavera   9/24/17 Patient is feeling better , heart rate is improved , has bradycardic episodes , did not reciev morning dose lopressor.  Patient did pass blood tinged stool , hemoglobin stable   9/26 Patient is feeling fine ,very anxious , hypertensive , heart rate is controlled , patient being taken for colonoscopy       PAST MEDICAL & SURGICAL HISTORY:  Cancer of ureter  MI (myocardial infarction)  DM (diabetes mellitus)  Afib  Hypertension      MEDICATIONS  (STANDING):  thyroid 15 milliGRAM(s) Oral daily  pantoprazole  Injectable 40 milliGRAM(s) IV Push every 12 hours  sodium chloride 0.9%. 1000 milliLiter(s) (100 mL/Hr) IV Continuous <Continuous>  insulin lispro (HumaLOG) corrective regimen sliding scale   SubCutaneous three times a day before meals  insulin lispro (HumaLOG) corrective regimen sliding scale   SubCutaneous at bedtime  dextrose 5%. 1000 milliLiter(s) (50 mL/Hr) IV Continuous <Continuous>  dextrose 50% Injectable 12.5 Gram(s) IV Push once  dextrose 50% Injectable 25 Gram(s) IV Push once  dextrose 50% Injectable 25 Gram(s) IV Push once  influenza   Vaccine 0.5 milliLiter(s) IntraMuscular once  metoprolol Injectable 2.5 milliGRAM(s) IV Push every 6 hours    MEDICATIONS  (PRN):  dextrose Gel 1 Dose(s) Oral once PRN Blood Glucose LESS THAN 70 milliGRAM(s)/deciLiter  glucagon  Injectable 1 milliGRAM(s) IntraMuscular once PRN Glucose <70 milliGRAM(s)/deciLiter      Vital Signs Last 24 Hrs  T(C): 36.8 (26 Sep 2017 09:23), Max: 36.9 (25 Sep 2017 17:05)  T(F): 98.2 (26 Sep 2017 09:23), Max: 98.4 (25 Sep 2017 17:05)  HR: 78 (26 Sep 2017 09:23) (62 - 101)  BP: 183/64 (26 Sep 2017 09:23) (155/61 - 183/64)  BP(mean): --  RR: 18 (26 Sep 2017 09:23) (16 - 18)  SpO2: 100% (26 Sep 2017 09:23) (96% - 100%)              PHYSICAL EXAM:    Constitutional: NAD, awake and alert, well-developed  HEENT: PERR, EOMI,  No oral cyananosis.  Neck:  supple,  No JVD  Respiratory: Breath sounds are clear bilaterally, No wheezing, rales or rhonchi  Cardiovascular: S1 and S2, irregularly irregular controlled rate  Gastrointestinal: Bowel Sounds present, soft, nontender.   Extremities: No peripheral edema. No clubbing or cyanosis.  Vascular: 2+ peripheral pulses  Neurological: A/O x 3, no focal deficits  Musculoskeletal: no calf tenderness.  Skin: No rashes.      LABS: All Labs Reviewed:                                                    11.5   8.3   )-----------( 237      ( 26 Sep 2017 07:27 )             34.2     09-26    146<H>  |  109<H>  |  9   ----------------------------<  134<H>  3.9   |  25  |  0.74    Ca    8.6      26 Sep 2017 08:15    TPro  6.3  /  Alb  3.5  /  TBili  0.7  /  DBili  x   /  AST  22  /  ALT  23  /  AlkPhos  61  09-26        LIVER FUNCTIONS - ( 26 Sep 2017 08:15 )  Alb: 3.5 g/dL / Pro: 6.3 g/dL / ALK PHOS: 61 U/L / ALT: 23 U/L DA / AST: 22 U/L / GGT: x           PT/INR - ( 26 Sep 2017 07:27 )   PT: 13.4 sec;   INR: 1.22 ratio                         RADIOLOGY/EKG:  Normal sinus rhythm  70 BPM ,    Monitor  afib with rapid ventricular rate
Patient is a 84y old  Female who presents with a chief complaint of rectal bleeding (23 Sep 2017 10:08)      INTERVAL HPI/OVERNIGHT EVENTS: Patient seen and examined. NAD. No complaints. Small amount of bleeding.      Vital Signs Last 24 Hrs  T(C): 36.6 (24 Sep 2017 05:00), Max: 36.8 (23 Sep 2017 16:59)  T(F): 97.9 (24 Sep 2017 05:00), Max: 98.3 (23 Sep 2017 16:59)  HR: 50 (24 Sep 2017 05:00) (50 - 102)  BP: 141/58 (24 Sep 2017 05:00) (98/62 - 141/58)  BP(mean): --  RR: 18 (24 Sep 2017 05:00) (16 - 18)  SpO2: 100% (24 Sep 2017 05:00) (97% - 100%)I&O's Summary    23 Sep 2017 07:01  -  24 Sep 2017 07:00  --------------------------------------------------------  IN: 0 mL / OUT: 3 mL / NET: -3 mL    24 Sep 2017 07:01  -  24 Sep 2017 09:55  --------------------------------------------------------  IN: 420 mL / OUT: 0 mL / NET: 420 mL        LABS:                        11.5   6.8   )-----------( 236      ( 24 Sep 2017 07:28 )             35.9     09-24    142  |  110<H>  |  18  ----------------------------<  122<H>  3.9   |  25  |  0.93    Ca    8.5      24 Sep 2017 07:28  Mg     2.1     -    TPro  7.0  /  Alb  3.4  /  TBili  0.4  /  DBili  x   /  AST  16  /  ALT  21  /  AlkPhos  59  09-23    PT/INR - ( 24 Sep 2017 07:28 )   PT: 27.6 sec;   INR: 2.49 ratio         PTT - ( 23 Sep 2017 00:16 )  PTT:45.8 sec  Urinalysis Basic - ( 23 Sep 2017 03:08 )    Color: Yellow / Appearance: Clear / S.010 / pH: x  Gluc: x / Ketone: Negative  / Bili: Negative / Urobili: Negative mg/dL   Blood: x / Protein: 30 mg/dL / Nitrite: Negative   Leuk Esterase: Small / RBC: 25-50 /HPF / WBC 3-5   Sq Epi: x / Non Sq Epi: x / Bacteria: Few      CAPILLARY BLOOD GLUCOSE  138 (24 Sep 2017 07:47)  122 (23 Sep 2017 21:00)  137 (23 Sep 2017 16:59)  209 (23 Sep 2017 11:57)              thyroid 15 milliGRAM(s) Oral daily  pantoprazole  Injectable 40 milliGRAM(s) IV Push every 12 hours  sodium chloride 0.9%. 1000 milliLiter(s) IV Continuous <Continuous>  insulin lispro (HumaLOG) corrective regimen sliding scale   SubCutaneous three times a day before meals  insulin lispro (HumaLOG) corrective regimen sliding scale   SubCutaneous at bedtime  dextrose 5%. 1000 milliLiter(s) IV Continuous <Continuous>  dextrose Gel 1 Dose(s) Oral once PRN  dextrose 50% Injectable 12.5 Gram(s) IV Push once  dextrose 50% Injectable 25 Gram(s) IV Push once  dextrose 50% Injectable 25 Gram(s) IV Push once  glucagon  Injectable 1 milliGRAM(s) IntraMuscular once PRN  influenza   Vaccine 0.5 milliLiter(s) IntraMuscular once  metoprolol Injectable 5 milliGRAM(s) IV Push every 6 hours      REVIEW OF SYSTEMS:  CONSTITUTIONAL: No fever, weight loss, or fatigue  NECK: No pain or stiffness  RESPIRATORY: No cough, wheezing, chills or hemoptysis; No shortness of breath  CARDIOVASCULAR: No chest pain, palpitations, dizziness, or leg swelling  GASTROINTESTINAL: No abdominal or epigastric pain. No nausea, vomiting, or hematemesis; No diarrhea or constipation. No melena, + hematochezia.  GENITOURINARY: No dysuria, frequency, hematuria, or incontinence  NEUROLOGICAL: No headaches, loss of strength, numbness, or tremors  SKIN: No itching, burning  MUSCULOSKELETAL: No joint pain or swelling; No muscle, back, or extremity pain  PSYCHIATRIC: No depression, mood swings, HEME/LYMPH: No easy bruising, or bleeding gums  ALLERY AND IMMUNOLOGIC: No hives       Consultant(s) Notes Reviewed:  [x ] YES  [ ] NO    PHYSICAL EXAM:  GENERAL: NAD, well-groomed, well-developed  HEAD:  Atraumatic, Normocephalic  EYES: EOMI, PERRLA, conjunctiva and sclera clear  ENMT: No tonsillar erythema, exudates, or enlargement; Moist mucous membranes  NECK: Supple, No JVD  NERVOUS SYSTEM:  Awake & alert  CHEST/LUNG: Clear to auscultation bilaterally; No rales, rhonchi, wheezing,  HEART: IRRegular rate and rhythm  ABDOMEN: Soft, Nontender, Nondistended; Bowel sounds present  EXTREMITIES:  No clubbing, cyanosis, or edema  LYMPH: No lymphadenopathy noted  SKIN: No rashes      Advanced care planning discussed with patient/family [x ] YES   [ ] NO

## 2017-09-26 NOTE — PROGRESS NOTE ADULT - PROBLEM SELECTOR PROBLEM 3
Coronary artery disease, angina presence unspecified, unspecified vessel or lesion type, unspecified whether native or transplanted heart
GI bleed

## 2017-09-26 NOTE — PROGRESS NOTE ADULT - PROBLEM SELECTOR PROBLEM 5
Type 2 diabetes mellitus with complication, without long-term current use of insulin

## 2017-09-27 ENCOUNTER — TRANSCRIPTION ENCOUNTER (OUTPATIENT)
Age: 82
End: 2017-09-27

## 2017-09-27 VITALS
OXYGEN SATURATION: 98 % | HEART RATE: 65 BPM | RESPIRATION RATE: 17 BRPM | TEMPERATURE: 98 F | SYSTOLIC BLOOD PRESSURE: 139 MMHG | DIASTOLIC BLOOD PRESSURE: 65 MMHG

## 2017-09-27 LAB
INR BLD: 1.18 RATIO — HIGH (ref 0.88–1.16)
PROTHROM AB SERPL-ACNC: 12.9 SEC — HIGH (ref 9.8–12.7)

## 2017-09-27 RX ORDER — HYDROCORTISONE 1 %
1 OINTMENT (GRAM) TOPICAL
Qty: 30 | Refills: 0
Start: 2017-09-27

## 2017-09-27 RX ORDER — HYDROCORTISONE 1 %
1 OINTMENT (GRAM) TOPICAL DAILY
Qty: 0 | Refills: 0 | Status: DISCONTINUED | OUTPATIENT
Start: 2017-09-27 | End: 2017-09-27

## 2017-09-27 RX ORDER — PANTOPRAZOLE SODIUM 20 MG/1
1 TABLET, DELAYED RELEASE ORAL
Qty: 30 | Refills: 0
Start: 2017-09-27 | End: 2017-10-27

## 2017-09-27 RX ADMIN — PANTOPRAZOLE SODIUM 40 MILLIGRAM(S): 20 TABLET, DELAYED RELEASE ORAL at 06:04

## 2017-09-27 RX ADMIN — Medication 15 MILLIGRAM(S): at 06:04

## 2017-09-27 RX ADMIN — Medication 50 MILLIGRAM(S): at 06:07

## 2017-09-27 RX ADMIN — LISINOPRIL 10 MILLIGRAM(S): 2.5 TABLET ORAL at 08:09

## 2017-09-27 NOTE — DISCHARGE NOTE ADULT - CARE PROVIDER_API CALL
Primary Care Physician and Cardiologist,   Phone: (   )    -  Fax: (   )    - Primary Care Physician and Cardiologist,   Phone: (   )    -  Fax: (   )    -    Robin Walker), Internal Medicine  [address] [place] [address]  Phone: [phone]  Fax: [phone]

## 2017-09-27 NOTE — DISCHARGE NOTE ADULT - SECONDARY DIAGNOSIS.
Chronic atrial fibrillation Essential hypertension Type 2 diabetes mellitus with complication, without long-term current use of insulin

## 2017-09-27 NOTE — DISCHARGE NOTE ADULT - CARE PLAN
Principal Discharge DX:	Gastrointestinal hemorrhage associated with anorectal source  Goal:	.  Instructions for follow-up, activity and diet:	Continue anusol suppositories daily. Follow-up with GI for repeat colonoscopy.  Secondary Diagnosis:	Chronic atrial fibrillation  Instructions for follow-up, activity and diet:	Continue current medications  Follow-up with your cardiologist within 1 week.  Check your coumadin level (INR) on Monday, October 2.  Secondary Diagnosis:	Essential hypertension  Instructions for follow-up, activity and diet:	Continue current medications  Secondary Diagnosis:	Type 2 diabetes mellitus with complication, without long-term current use of insulin  Instructions for follow-up, activity and diet:	Continue current medications

## 2017-09-27 NOTE — DISCHARGE NOTE ADULT - PLAN OF CARE
. Continue anusol suppositories daily. Follow-up with GI for repeat colonoscopy. Continue current medications  Follow-up with your cardiologist within 1 week.  Check your coumadin level (INR) on Monday, October 2. Continue current medications

## 2017-09-27 NOTE — DISCHARGE NOTE ADULT - MEDICATION SUMMARY - MEDICATIONS TO TAKE
I will START or STAY ON the medications listed below when I get home from the hospital:    aspirin 81 mg oral tablet  -- 1 tab(s) by mouth once a day  -- Indication: For Coronary artery disease    ramipril 10 mg oral capsule  -- 1 cap(s) by mouth once a day  -- Indication: For Essential hypertension    warfarin 5 mg oral tablet  -- 4x per week  -- Indication: For Chronic atrial fibrillation    warfarin 2.5 mg oral tablet  -- 3x/week  -- Indication: For Chronic atrial fibrillation    Actos 15 mg oral tablet  -- 1 tab(s) by mouth once a day  -- Indication: For Type 2 diabetes mellitus with complication, without long-term current use of insulin    simvastatin 20 mg oral tablet  -- 1 tab(s) by mouth once a day (at bedtime)  -- Indication: For Hyperlipidemia    Toprol-XL 50 mg oral tablet, extended release  -- orally 2 times a day  -- Indication: For Essential hypertension    amLODIPine 5 mg oral tablet  -- 1 tab(s) by mouth once a day  -- Indication: For Essential hypertension    hydrocortisone 25 mg rectal suppository  -- 1 suppository(ies) rectally once a day  -- Indication: For Gastrointestinal hemorrhage associated with anorectal source    pantoprazole 40 mg oral delayed release tablet  -- 1 tab(s) by mouth once a day (before a meal)  -- Indication: For Gastrointestinal hemorrhage    Birney Thyroid 15 mg oral tablet  -- 1 tab(s) by mouth once a day  -- Indication: For Hypothyroid

## 2017-09-27 NOTE — DISCHARGE NOTE ADULT - HOSPITAL COURSE
This is an 84 F with PMH of HTN hypothyroid CAD a fib on coumadin diverticulosis who came in c/o rectal bleeding. Patient was in her usual state of health up until last when she was sitting and felt something wet but saw that she was passing blood clots rectally. She denies an abd pain, dizziness, lightheadedness, n/v/constipation, CP, HILLIARD, SOB, fevers and chills. She did feel palpitations at times yesterday. She reports having 6 small loose BMs on 9/19/17 (Tuesday) with scant blood. She attributed it to a stomach virus which her daughter and her grandchildren were having. Those symptoms had resolved by the next day. She has had some bleeding while she's been in the hospital, but less than at home. Her last colonoscopy was in 2009. She reports never being constipated and rarely straining.    Patient had scant bleeding in the hospital. Her h/h remained stable and she did not require PRBC. She did have episode of MARLEE for which she was treated with iv cardizem. Colonoscopy: poor prep, + ticks, + internal hemorrhoids. Bleed might have been a diverticular bleed or a hemorrhoidal bleed. There was no active bleeding seen. She will require a require a repeat colonoscopy in the near future to better visualize the remainder of her colon.     She is now rate controlled on po meds. Doing well. Tolerating diet. No bleed. She has resumed her coumadin. INR check in 5 days. Will go home on PPI and anusol suppositories.

## 2017-09-27 NOTE — DISCHARGE NOTE ADULT - PROVIDER TOKENS
FREE:[LAST:[Primary Care Physician and Cardiologist],PHONE:[(   )    -],FAX:[(   )    -]] FREE:[LAST:[Primary Care Physician and Cardiologist],PHONE:[(   )    -],FAX:[(   )    -]],TOKEN:'[id number]'

## 2017-09-27 NOTE — DISCHARGE NOTE ADULT - PATIENT PORTAL LINK FT
“You can access the [facility] Patient Portal, offered by [facility], by registering with the following website: [email or web address]”

## 2017-10-19 PROCEDURE — 80053 COMPREHEN METABOLIC PANEL: CPT

## 2017-10-19 PROCEDURE — 93005 ELECTROCARDIOGRAM TRACING: CPT

## 2017-10-19 PROCEDURE — 84484 ASSAY OF TROPONIN QUANT: CPT

## 2017-10-19 PROCEDURE — 83735 ASSAY OF MAGNESIUM: CPT

## 2017-10-19 PROCEDURE — 85610 PROTHROMBIN TIME: CPT

## 2017-10-19 PROCEDURE — 83690 ASSAY OF LIPASE: CPT

## 2017-10-19 PROCEDURE — 85730 THROMBOPLASTIN TIME PARTIAL: CPT

## 2017-10-19 PROCEDURE — 99285 EMERGENCY DEPT VISIT HI MDM: CPT | Mod: 25

## 2017-10-19 PROCEDURE — 81001 URINALYSIS AUTO W/SCOPE: CPT

## 2017-10-19 PROCEDURE — 85027 COMPLETE CBC AUTOMATED: CPT

## 2017-10-19 PROCEDURE — 80048 BASIC METABOLIC PNL TOTAL CA: CPT

## 2017-10-19 PROCEDURE — 71045 X-RAY EXAM CHEST 1 VIEW: CPT

## 2019-02-19 NOTE — PRE-OP CHECKLIST - AICD PRESENT
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 Patient has underwent  ex-lap, supracervical hysterectomy, bilateral salpingo-oophorectomy, omentectomy and tumor debulking on 2015.  She completed six cycles of carboplatin and Taxane chemotherapy, all completed on  2016. S/p Pelvic mass resection and radical trachelectomy, omentectomy on 16 followed by carboplatin/gemzar/avastin, she completed 5 cycles with the last cycle in 2017. She completed 3 cycles of doxil 2017 for recurrent disease. Now  s/p  pelvic RT  completed 2018. 1 more cycle doxil completed 3/2018 and patient had difficulty tolerating doxil (side effects including fatigue,  dyspnea with any excretion,  changes to  her stools including small stools , pain with both voiding and defecating,  issues with reflux, pain to hands, feet and legs.) Patient was to start Zejula on  but did not receive medication yet because of insurance issues. She then transferred care to [facility] and received additional chemotherapy with her last course in 2018.      She presented on  direct admission from her primary care physician office yesterday for evaluation of lower abdominal pain patient has been hospitalized in October for similar complaints and patient at that time found to have hydronephrosis and had a stent  placement by urology for obstructive uropathy. Patient is set to see Dr Luciano for metallic stent placement on the . Most recent CT is from 10/2018 with mild to moderate HN.  She  denies fevers, chills, nausea, vomiting, dysuria, or hematuria.Patient adamantly refuses PCN placement.  She states that she experiences vaginal bleeding intermittent for the past several months.  Sometimes the bleeding is heavy like a period and other times there is no bleeding or very minimal spotting.  She experiences some vaginal pressure and cramps prior to her bleeding episodes and thinks its the result of increased activity or bending.   She denies any vaginal bleeding fo the past three days.  She states she almost always notices some bleeding when she urinates.  She needs to wear a diaper due to urinary incontinences.  She has been working with palliative care for some time now and is in the process of setting up home hospice.     Denies fevers/chills. Denies dysuria. Has issues with urinary frequency and bladder spasms since radiation, no new changes. Denies CP, SOB, leg pain.       REVIEW OF SYSTEMS:  A 14-point review of systems was performed with any pertinent positives noted in the History of Present Illness and otherwise negative.    PAST MEDICAL HISTORY:  Fibromyalgia, obesity with a BMI of 38, chronic low back pain, internal varicose veins in her legs.    PAST SURGICAL HISTORY:  , prior shoulder and hand surgeries, ex-lap, supracervical hysterectomy, bilateral salpingo-oophorectomy and debulking as above. cholecystectomy.     ALLERGIES:  Taxol allergy.    CURRENT MEDICATIONS:    Medications (11) Active  Scheduled: (7)  Gabapentin 400 mg cap  400 mg 1 cap, Oral, Q6H  Lactobacillus acidophilus tab  1 tab, Oral, Daily  Multivitamin tab  1 tab, Oral, Daily  OxyCODONE 40 mg CR tab  40 mg 1 tab, Oral, Q12H  Pantoprazole 40 mg DR tab  40 mg 1 tab, Oral, BID  Polyethylene glycol 3350 oral recon powder 17 gm packet UD   17 gm 1 packet, Oral, Daily  Senna-docusate sodium 8.6-50 mg tab  2 tab, Oral, Q Bedtime  Continuous: (1)  Sodium Chloride 0.9% 1,000 mL  1,000 mL, IV, 60 mL/hr  PRN: (3)  ALPRAZolam 0.25 mg tab  0.5 mg 2 tab, Oral, Q12H  Ondansetron 4 mg/2 mL inj SDV  4 mg 2 mL, Slow IV Push, Q6H  OxyCODONE 15 mg IR tab  15 mg 1 tab, Oral, Q3H      OB/GYN H/ISTORY:  G3, P3 0-0-3 with two full-term spontaneous vaginal deliveries and one .  No history of abnormal Pap smears.  Menarche at age 12.  No history of sexually transmitted infections.     FAMILY HISTORY:  No family members with any breast, colon, endometrial or ovarian cancers.  The patient's BRCA testing is negative, mother recently      SOCIAL HISTORY:  The patient is  and lives with her .  She does not work.  She denies tobacco, alcohol or illicit drug use.    PREVENTATIVE SCREENING:  All performed through Dr. Holder.     PHYSICAL EXAMINATION:      Vitals between:   2019 13:38:40   TO   2019 13:38:40                   LAST RESULT MINIMUM MAXIMUM  Temperature 37.1 36.8 37.4  Heart Rate 81 75 82  Respiratory Rate 18 15 18  NISBP           113 102 113  NIDBP           76 58 76  SpO2                    98 94 99      Gen: NAD, AAOx3  HEENT:  Normocephalic, atraumatic.  Extraocular muscles are intact.  Lymph Nodes:  Negative cervical, supraclavicular and inguinal nodes.   Lungs:  Clear to auscultation bilaterally.    Cardiac Exam:  Regular rate and rhythm.  S1, S2 present.    Abdomen:  Obese, soft,  nondistended nontender with well-healed surgical incisions and no palpable masses or ascites. Some firmness along midline incision at the level of the umbilicus, likely scar tissue.  Skin: no rashes, warm and dry  Back:  TTP in mid and lower right back, CVA tenderness   Extremities:No clubbing or cyanosis.     Pelvic Exam: normal appearing external genitalia, upon placement of the speculum there is no bleeding or discharge or lesions  noted.  The inferior portion of the tumor at the vaginal cuff is appreciated and appears slightly friable.       DATA :     CT C/A/P 10/18/2016:   FINDINGS: There is an area of chronic scarring within the anterior inferior right upper lobe.  There is stable minimal scarring within the medial left lower lung.  Within the left costophrenic angle there is a stable 2.6 cm noncalcified subpleural nodule.  On image number 20 of series number three there is a stable 3 mm pleural-based nodule adjacent to the minor fissure.  Remainder the lungs are clear.  The trachea and major bronchi are patent.   The demonstrated portion of the thyroid gland is unremarkable.  The heart size is normal.  The aorta is of normal caliber.  There are a few atherosclerotic calcifications noted within the thoracic aorta.  There are a few non-pathologically enlarged lymph nodes in within the mediastinum.  These are unchanged.  The liver, spleen, pancreas, adrenal glands and kidneys are normal in appearance.  The contrast-filled loops of bowel are unremarkable in appearance.  The aorta and iliac arteries are of normal caliber demonstrating scattered atherosclerotic calcifications.  In the pelvis, there is been interval development 3.0 x 3.6 cm soft tissue mass that lies posterior to either the vaginal cuff or uterus and just to the left of the distal sigmoid colon.  Correlation with the patient's surgical history is suggested.  This is suspicious for recurrence or metastatic disease.  Multiple surgical clips noted within the pelvis, the ureters demonstrate normal course and caliber to their junction with the normal  appearing urinary bladder.  There is no free fluid lymphadenopathy or other abnormal soft tissue mass.  IMPRESSION:  1.  There is been interval development of a 3.6 x 3.0 cm soft tissue mass to the left of the sigmoid colon adjacent to either the vaginal cuff or residual uterus.  This may represent a focal area of metastatic disease or  recurrence.  2.  Two tiny stable pulmonary nodules as discussed in detail above.  Short-term follow-up CT scan is suggested in 4-6 months.  3.   Stable chronic minimal scarring the bilateral lower lung fields.    Pathology 11/8/16:   A. Right pelvic peritoneum; biopsies:  -  Poorly differentiated high grade serous carcinoma.  B. Posterior cul-de-sac mass; excision:  -  Serous carcinoma, high grade.  C. Cervix; trachelectomy:  -  Benign ectocervical and endocervical tissue.  D. Omentum; biopsy:  -  Multiple foci of high grade serous carcinoma.  E. Appendix; appendectomy:  -  Appendix negative for tumor.  F. Gallbladder; cholecystectomy:  -  Mild chronic cholecystitis.  -  No stones identified.    CT Brain 02/03/17:  Chronic pansinusitis, otherwise unremarkable.     CT C/A/P 5/5/17:.    CHEST IMPRESSION:  No evidence of metastatic disease in the chest.  ABDOMEN/PELVIS FINDINGS:  LIVER: There is diffuse low-attenuation of the liver parenchyma compatible with hepatic  steatosis..  OTHER: No evidence of free fluid in the abdomen and pelvis. There has been interval resolution  of previously noted soft tissue mass adjacent to the sigmoid colon in the pelvis. Additionally,  there has been interval resolution of soft tissue nodule present in the anterior LEFT  hemiabdomen.  ABDOMEN/PELVIS IMPRESSION:  1. Interval resolution of previously noted soft tissue mass adjacent to the sigmoid colon in the  pelvis and a soft tissue nodule in the anterior LEFT hemiabdomen.  2. Hepatic steatosis.    CT C/A/P ;8/22/17 : IMPRESSION: High suspicion for local recurrence along the left side of the vaginal cuff and the left margin of the rectosigmoid junction. No CT evidence of local or distant adenopathy, ascites or chest metastases.    CT C/A/P 12/5/017: IMPRESSION:  1. Increased soft tissue density along the margin of the vaginal cuff adjacent to the sigmoid  colon suspicious for recurrent tumor. This has increased when compared with the  previous exam.  2. Otherwise stable appearance of the chest, abdomen and pelvis without evidence of distant  disease.    Echo 12/7/17:  2. Left ventricle: The cavity size is normal. Wall thickness is normal.    Systolic function is normal. The estimated ejection fraction is 55-60%,    by biplane method of disks.      CA-125 7/18/16 was 7. Cr 1.26, LFT 47/64.   CA -125 10/3/16: 23  CA -125 10/17/16 : 32  CA -125 01/03/17 : 9.8  CA-125 1/23/17: 10  CA-125 2/9/17: 6  CA-125 3/4/17: 5  CA- 125 5/03/17: 6    8//17 : 37 (time of second recurrence)  CA-125 prior to cycle #1 doxil: 69  CA-125 prior to cycle #2 doxil 10/2017 was 43  CA-125 prior to cycle #3 doxil : 37   CA-125 prior to cycle #4 doxil:  31  CA-125 after cycle #4 doxil : 88    Repeat  4/4/18 after cycle #5 doxil: 62    Pelvic MRI 1/29/18: 1. An implant on the vaginal cuff also involves the wall of the sigmoid colon without evidence of obstruction.  2.  Nodes that are normal size but increased since May 2017 are present in the left external iliac and bilateral common iliac chains.  They are suspicious for metastatic involvement.  No ascites.    CT scan 4/4/18 : There is no evidence of bowel obstruction.  1. Interval onset of multiple tiny noncalcified nodules in both lungs, more in number the right  lung.  Interval onset of retroperitoneal lymphadenopathy. Partial improvement in the soft tissue mass at the vaginal cuff close to sigmoid.        Labs between:  18-FEB-2019 13:38 to 19-FEB-2019 13:38    CBC:                 WBC  HgB  Hct  Plt  MCV  RDW   19-FEB-2019 5.1  (L) 10.4  (L) 31.6  212  88.3  13.8     DIFF:                 Seg  Neutroph//ABS  Lymph//ABS  Mono//ABS  EOS/ABS  19-FEB-2019 NOT APPLICABLE  72 // 3.6 11 // (L) 0.6  11 // 0.6 6 // 0.3    BMP:                 Na  Cl  BUN  Glu   19-FEB-2019 140  105  8  (H) 106                              K  CO2  Cr  Ca                              3.5  27  (H) 0.99  (L) 8.3                    Result  title:  CT ABDOMEN AND PELVIS W CON  Result status:  Final  Verified by:  JACKY, ESTEVANABEL ALESSIA on 04/23/2018 1:24  FINDINGS: Reidentified multiple tiny scattered pulmonary nodules and bibasal compressive atelectasis.  Normal cardiac size without pericardial effusion. Liver is normal sized with mild fatty infiltration.  Patient is status post cholecystectomy.  The pancreas, spleen, adrenals and kidneys appear normal.  Note is made of prominent retroperitoneal nodes with a representative aortocaval node measuring up to 13 mm in transverse diameter.  Increased  inflammatory stranding is noted around the retroperitoneal nodes encasing the right renal artery.Sigmoid colon diverticulosis.  Visualized loops of bowel otherwise, appear unremarkable.  Normal appendix.  Note is made of reactive nodes in the right lower quadrant.  Reidentified ventral hernia containing loops of nondilated rectosigmoid colon.The urinary bladder appears unremarkable.  Patient is status post hysterectomy .  Improving inflammatory  stranding in the left hemipelvisNo abnormal bony lesions.  Mild multilevel degenerative changes of the lower thoracic vertebrae  IMPRESSION:1.   Suggestion of early retroperitoneal fibrosis.  Stable retroperitoneal adenopathy.2.   Improving inflammatory stranding around the vaginal cuff and left hemipelvis3.   Stable multiple tiny pulmonary nodules.          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 Patient has underwent  ex-lap, supracervical hysterectomy, bilateral salpingo-oophorectomy, omentectomy and tumor debulking on October 16, 2015.  She completed six cycles of carboplatin and Taxane chemotherapy, all completed on February 18, 2016. S/p Pelvic mass resection and radical trachelectomy, omentectomy on 11/8/16 followed by  carboplatin/gemzar/avastin, she completed 5 cycles with the last cycle in 04/2017. She completed 3 cycles of doxil 12/2017 for recurrent disease. Now  s/p  pelvic RT  completed 2/2018. 1 more cycle doxil completed 3/2018 and patient had difficulty tolerating doxil (side effects including fatigue,  dyspnea with any excretion,  changes to her stools  including small stools , pain with both voiding and defecating,  issues with reflux, pain to hands, feet and legs.) Patient was to start Zejula on 4/22/18. She completed her last course of chemotherapy at [facility] in 11/2018.  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 She presents to the hospital due to abdominal pain and vaginal bleeding.    RECOMMENDATIONS:  -No evidence of active vaginal bleeding upon examination today  -Recommend to monitor with pad counts at this time  -May consider transfusion with Hgb continues to trend down <7  -May also consider possible IR embolization if vaginal bleeding worsens  -No further gynonc recommendations at this time  -Please notify if vaginal bleeding worsens  -Will sign off     Will d/w attending, Dr. Lauri Bergman, DO R4     days,  nights  gynecology oncology resident pager   no

## 2019-06-03 ENCOUNTER — EMERGENCY (EMERGENCY)
Facility: HOSPITAL | Age: 84
LOS: 1 days | Discharge: ANOTHER HOSPITAL | End: 2019-06-03
Attending: EMERGENCY MEDICINE | Admitting: EMERGENCY MEDICINE
Payer: MEDICARE

## 2019-06-03 ENCOUNTER — INPATIENT (INPATIENT)
Facility: HOSPITAL | Age: 84
LOS: 1 days | Discharge: HOME | DRG: 86 | End: 2019-06-05
Attending: SURGERY | Admitting: SURGERY
Payer: MEDICARE

## 2019-06-03 VITALS
SYSTOLIC BLOOD PRESSURE: 142 MMHG | OXYGEN SATURATION: 98 % | TEMPERATURE: 98 F | HEART RATE: 81 BPM | DIASTOLIC BLOOD PRESSURE: 71 MMHG | RESPIRATION RATE: 16 BRPM

## 2019-06-03 VITALS
HEART RATE: 70 BPM | TEMPERATURE: 98 F | DIASTOLIC BLOOD PRESSURE: 61 MMHG | OXYGEN SATURATION: 99 % | SYSTOLIC BLOOD PRESSURE: 184 MMHG | RESPIRATION RATE: 16 BRPM | HEIGHT: 60 IN | WEIGHT: 125 LBS

## 2019-06-03 VITALS
OXYGEN SATURATION: 96 % | HEIGHT: 62 IN | TEMPERATURE: 98 F | RESPIRATION RATE: 15 BRPM | DIASTOLIC BLOOD PRESSURE: 69 MMHG | WEIGHT: 125 LBS | SYSTOLIC BLOOD PRESSURE: 157 MMHG | HEART RATE: 78 BPM

## 2019-06-03 DIAGNOSIS — I60.9 NONTRAUMATIC SUBARACHNOID HEMORRHAGE, UNSPECIFIED: ICD-10-CM

## 2019-06-03 LAB
ALBUMIN SERPL ELPH-MCNC: 3.8 G/DL — SIGNIFICANT CHANGE UP (ref 3.3–5)
ALBUMIN SERPL ELPH-MCNC: 4.4 G/DL — SIGNIFICANT CHANGE UP (ref 3.3–5)
ALP SERPL-CCNC: 65 U/L — SIGNIFICANT CHANGE UP (ref 40–120)
ALP SERPL-CCNC: 71 U/L — SIGNIFICANT CHANGE UP (ref 30–120)
ALT FLD-CCNC: 18 U/L — SIGNIFICANT CHANGE UP (ref 10–45)
ALT FLD-CCNC: 24 U/L DA — SIGNIFICANT CHANGE UP (ref 10–60)
ANION GAP SERPL CALC-SCNC: 13 MMOL/L — SIGNIFICANT CHANGE UP (ref 5–17)
ANION GAP SERPL CALC-SCNC: 8 MMOL/L — SIGNIFICANT CHANGE UP (ref 5–17)
APPEARANCE UR: CLEAR — SIGNIFICANT CHANGE UP
APTT BLD: 29.4 SEC — SIGNIFICANT CHANGE UP (ref 27.5–36.3)
APTT BLD: 43 SEC — HIGH (ref 28.5–37)
AST SERPL-CCNC: 18 U/L — SIGNIFICANT CHANGE UP (ref 10–40)
AST SERPL-CCNC: 25 U/L — SIGNIFICANT CHANGE UP (ref 10–40)
BASOPHILS # BLD AUTO: 0.09 K/UL — SIGNIFICANT CHANGE UP (ref 0–0.2)
BASOPHILS # BLD AUTO: 0.1 K/UL — SIGNIFICANT CHANGE UP (ref 0–0.2)
BASOPHILS NFR BLD AUTO: 0.6 % — SIGNIFICANT CHANGE UP (ref 0–2)
BASOPHILS NFR BLD AUTO: 0.6 % — SIGNIFICANT CHANGE UP (ref 0–2)
BILIRUB SERPL-MCNC: 0.4 MG/DL — SIGNIFICANT CHANGE UP (ref 0.2–1.2)
BILIRUB SERPL-MCNC: 0.4 MG/DL — SIGNIFICANT CHANGE UP (ref 0.2–1.2)
BILIRUB UR-MCNC: NEGATIVE — SIGNIFICANT CHANGE UP
BLD GP AB SCN SERPL QL: NEGATIVE — SIGNIFICANT CHANGE UP
BUN SERPL-MCNC: 30 MG/DL — HIGH (ref 7–23)
BUN SERPL-MCNC: 35 MG/DL — HIGH (ref 7–23)
CALCIUM SERPL-MCNC: 9.1 MG/DL — SIGNIFICANT CHANGE UP (ref 8.4–10.5)
CALCIUM SERPL-MCNC: 9.4 MG/DL — SIGNIFICANT CHANGE UP (ref 8.4–10.5)
CHLORIDE SERPL-SCNC: 101 MMOL/L — SIGNIFICANT CHANGE UP (ref 96–108)
CHLORIDE SERPL-SCNC: 96 MMOL/L — SIGNIFICANT CHANGE UP (ref 96–108)
CO2 SERPL-SCNC: 23 MMOL/L — SIGNIFICANT CHANGE UP (ref 22–31)
CO2 SERPL-SCNC: 26 MMOL/L — SIGNIFICANT CHANGE UP (ref 22–31)
COLOR SPEC: COLORLESS — SIGNIFICANT CHANGE UP
CREAT SERPL-MCNC: 0.94 MG/DL — SIGNIFICANT CHANGE UP (ref 0.5–1.3)
CREAT SERPL-MCNC: 1.12 MG/DL — SIGNIFICANT CHANGE UP (ref 0.5–1.3)
DIFF PNL FLD: ABNORMAL
EOSINOPHIL # BLD AUTO: 0.08 K/UL — SIGNIFICANT CHANGE UP (ref 0–0.5)
EOSINOPHIL # BLD AUTO: 0.1 K/UL — SIGNIFICANT CHANGE UP (ref 0–0.5)
EOSINOPHIL NFR BLD AUTO: 0.6 % — SIGNIFICANT CHANGE UP (ref 0–6)
EOSINOPHIL NFR BLD AUTO: 0.7 % — SIGNIFICANT CHANGE UP (ref 0–6)
ETHANOL SERPL-MCNC: SIGNIFICANT CHANGE UP MG/DL (ref 0–10)
GLUCOSE SERPL-MCNC: 148 MG/DL — HIGH (ref 70–99)
GLUCOSE SERPL-MCNC: 158 MG/DL — HIGH (ref 70–99)
GLUCOSE UR QL: NEGATIVE — SIGNIFICANT CHANGE UP
HCT VFR BLD CALC: 39.6 % — SIGNIFICANT CHANGE UP (ref 34.5–45)
HCT VFR BLD CALC: 40.6 % — SIGNIFICANT CHANGE UP (ref 34.5–45)
HGB BLD-MCNC: 13.3 G/DL — SIGNIFICANT CHANGE UP (ref 11.5–15.5)
HGB BLD-MCNC: 13.6 G/DL — SIGNIFICANT CHANGE UP (ref 11.5–15.5)
IMM GRANULOCYTES NFR BLD AUTO: 0.5 % — SIGNIFICANT CHANGE UP (ref 0–1.5)
INR BLD: 1.07 RATIO — SIGNIFICANT CHANGE UP (ref 0.88–1.16)
INR BLD: 2.79 RATIO — HIGH (ref 0.88–1.16)
KETONES UR-MCNC: SIGNIFICANT CHANGE UP
LACTATE BLDV-MCNC: 1.5 MMOL/L — SIGNIFICANT CHANGE UP (ref 0.7–2)
LEUKOCYTE ESTERASE UR-ACNC: NEGATIVE — SIGNIFICANT CHANGE UP
LIDOCAIN IGE QN: 36 U/L — SIGNIFICANT CHANGE UP (ref 7–60)
LYMPHOCYTES # BLD AUTO: 1.9 K/UL — SIGNIFICANT CHANGE UP (ref 1–3.3)
LYMPHOCYTES # BLD AUTO: 12.4 % — LOW (ref 13–44)
LYMPHOCYTES # BLD AUTO: 14.7 % — SIGNIFICANT CHANGE UP (ref 13–44)
LYMPHOCYTES # BLD AUTO: 2.04 K/UL — SIGNIFICANT CHANGE UP (ref 1–3.3)
MCHC RBC-ENTMCNC: 31.9 PG — SIGNIFICANT CHANGE UP (ref 27–34)
MCHC RBC-ENTMCNC: 31.9 PG — SIGNIFICANT CHANGE UP (ref 27–34)
MCHC RBC-ENTMCNC: 33.5 GM/DL — SIGNIFICANT CHANGE UP (ref 32–36)
MCHC RBC-ENTMCNC: 33.6 GM/DL — SIGNIFICANT CHANGE UP (ref 32–36)
MCV RBC AUTO: 95 FL — SIGNIFICANT CHANGE UP (ref 80–100)
MCV RBC AUTO: 95.1 FL — SIGNIFICANT CHANGE UP (ref 80–100)
MONOCYTES # BLD AUTO: 0.8 K/UL — SIGNIFICANT CHANGE UP (ref 0–0.9)
MONOCYTES # BLD AUTO: 0.85 K/UL — SIGNIFICANT CHANGE UP (ref 0–0.9)
MONOCYTES NFR BLD AUTO: 5.6 % — SIGNIFICANT CHANGE UP (ref 2–14)
MONOCYTES NFR BLD AUTO: 6.1 % — SIGNIFICANT CHANGE UP (ref 2–14)
NEUTROPHILS # BLD AUTO: 10.75 K/UL — HIGH (ref 1.8–7.4)
NEUTROPHILS # BLD AUTO: 12.1 K/UL — HIGH (ref 1.8–7.4)
NEUTROPHILS NFR BLD AUTO: 77.5 % — HIGH (ref 43–77)
NEUTROPHILS NFR BLD AUTO: 80.7 % — HIGH (ref 43–77)
NITRITE UR-MCNC: NEGATIVE — SIGNIFICANT CHANGE UP
NRBC # BLD: 0 /100 WBCS — SIGNIFICANT CHANGE UP (ref 0–0)
PH UR: 6 — SIGNIFICANT CHANGE UP (ref 5–8)
PLATELET # BLD AUTO: 239 K/UL — SIGNIFICANT CHANGE UP (ref 150–400)
PLATELET # BLD AUTO: 249 K/UL — SIGNIFICANT CHANGE UP (ref 150–400)
POTASSIUM SERPL-MCNC: 3.8 MMOL/L — SIGNIFICANT CHANGE UP (ref 3.5–5.3)
POTASSIUM SERPL-MCNC: 4.3 MMOL/L — SIGNIFICANT CHANGE UP (ref 3.5–5.3)
POTASSIUM SERPL-SCNC: 3.8 MMOL/L — SIGNIFICANT CHANGE UP (ref 3.5–5.3)
POTASSIUM SERPL-SCNC: 4.3 MMOL/L — SIGNIFICANT CHANGE UP (ref 3.5–5.3)
PROT SERPL-MCNC: 7 G/DL — SIGNIFICANT CHANGE UP (ref 6–8.3)
PROT SERPL-MCNC: 7.2 G/DL — SIGNIFICANT CHANGE UP (ref 6–8.3)
PROT UR-MCNC: ABNORMAL
PROTHROM AB SERPL-ACNC: 12.2 SEC — SIGNIFICANT CHANGE UP (ref 10–12.9)
PROTHROM AB SERPL-ACNC: 31.4 SEC — HIGH (ref 10–12.9)
RBC # BLD: 4.17 M/UL — SIGNIFICANT CHANGE UP (ref 3.8–5.2)
RBC # BLD: 4.27 M/UL — SIGNIFICANT CHANGE UP (ref 3.8–5.2)
RBC # FLD: 12 % — SIGNIFICANT CHANGE UP (ref 10.3–14.5)
RBC # FLD: 12.9 % — SIGNIFICANT CHANGE UP (ref 10.3–14.5)
RH IG SCN BLD-IMP: POSITIVE — SIGNIFICANT CHANGE UP
SODIUM SERPL-SCNC: 132 MMOL/L — LOW (ref 135–145)
SODIUM SERPL-SCNC: 135 MMOL/L — SIGNIFICANT CHANGE UP (ref 135–145)
SP GR SPEC: 1.01 — SIGNIFICANT CHANGE UP (ref 1.01–1.02)
UROBILINOGEN FLD QL: NEGATIVE — SIGNIFICANT CHANGE UP
WBC # BLD: 13.88 K/UL — HIGH (ref 3.8–10.5)
WBC # BLD: 15 K/UL — HIGH (ref 3.8–10.5)
WBC # FLD AUTO: 13.88 K/UL — HIGH (ref 3.8–10.5)
WBC # FLD AUTO: 15 K/UL — HIGH (ref 3.8–10.5)

## 2019-06-03 PROCEDURE — 93010 ELECTROCARDIOGRAM REPORT: CPT

## 2019-06-03 PROCEDURE — 70486 CT MAXILLOFACIAL W/O DYE: CPT | Mod: 26

## 2019-06-03 PROCEDURE — 70450 CT HEAD/BRAIN W/O DYE: CPT | Mod: 26

## 2019-06-03 PROCEDURE — 99284 EMERGENCY DEPT VISIT MOD MDM: CPT

## 2019-06-03 PROCEDURE — 73090 X-RAY EXAM OF FOREARM: CPT | Mod: 26,LT

## 2019-06-03 PROCEDURE — 99222 1ST HOSP IP/OBS MODERATE 55: CPT

## 2019-06-03 PROCEDURE — 70450 CT HEAD/BRAIN W/O DYE: CPT | Mod: 26,77

## 2019-06-03 PROCEDURE — 73110 X-RAY EXAM OF WRIST: CPT | Mod: 26,LT

## 2019-06-03 PROCEDURE — 99285 EMERGENCY DEPT VISIT HI MDM: CPT | Mod: 25

## 2019-06-03 RX ORDER — PHYTONADIONE (VIT K1) 5 MG
10 TABLET ORAL ONCE
Refills: 0 | Status: COMPLETED | OUTPATIENT
Start: 2019-06-03 | End: 2019-06-03

## 2019-06-03 RX ORDER — RAMIPRIL 5 MG
0 CAPSULE ORAL
Qty: 0 | Refills: 0 | DISCHARGE

## 2019-06-03 RX ORDER — GLUCAGON INJECTION, SOLUTION 0.5 MG/.1ML
1 INJECTION, SOLUTION SUBCUTANEOUS ONCE
Refills: 0 | Status: DISCONTINUED | OUTPATIENT
Start: 2019-06-03 | End: 2019-06-05

## 2019-06-03 RX ORDER — WARFARIN SODIUM 2.5 MG/1
0 TABLET ORAL
Qty: 0 | Refills: 0 | DISCHARGE

## 2019-06-03 RX ORDER — INSULIN LISPRO 100/ML
VIAL (ML) SUBCUTANEOUS AT BEDTIME
Refills: 0 | Status: DISCONTINUED | OUTPATIENT
Start: 2019-06-03 | End: 2019-06-05

## 2019-06-03 RX ORDER — INSULIN LISPRO 100/ML
VIAL (ML) SUBCUTANEOUS
Refills: 0 | Status: DISCONTINUED | OUTPATIENT
Start: 2019-06-03 | End: 2019-06-05

## 2019-06-03 RX ORDER — METOPROLOL TARTRATE 50 MG
1 TABLET ORAL
Qty: 0 | Refills: 0 | DISCHARGE

## 2019-06-03 RX ORDER — SIMVASTATIN 20 MG/1
20 TABLET, FILM COATED ORAL AT BEDTIME
Refills: 0 | Status: DISCONTINUED | OUTPATIENT
Start: 2019-06-03 | End: 2019-06-05

## 2019-06-03 RX ORDER — METOPROLOL TARTRATE 50 MG
0 TABLET ORAL
Qty: 0 | Refills: 0 | DISCHARGE

## 2019-06-03 RX ORDER — AMLODIPINE BESYLATE 2.5 MG/1
5 TABLET ORAL DAILY
Refills: 0 | Status: DISCONTINUED | OUTPATIENT
Start: 2019-06-03 | End: 2019-06-05

## 2019-06-03 RX ORDER — SIMVASTATIN 20 MG/1
1 TABLET, FILM COATED ORAL
Qty: 0 | Refills: 0 | DISCHARGE

## 2019-06-03 RX ORDER — THYROID 120 MG
1 TABLET ORAL
Qty: 0 | Refills: 0 | DISCHARGE

## 2019-06-03 RX ORDER — DESMOPRESSIN ACETATE 0.1 MG/1
23 TABLET ORAL ONCE
Refills: 0 | Status: DISCONTINUED | OUTPATIENT
Start: 2019-06-03 | End: 2019-06-03

## 2019-06-03 RX ORDER — SODIUM CHLORIDE 9 MG/ML
1000 INJECTION, SOLUTION INTRAVENOUS
Refills: 0 | Status: DISCONTINUED | OUTPATIENT
Start: 2019-06-03 | End: 2019-06-05

## 2019-06-03 RX ORDER — PIOGLITAZONE HYDROCHLORIDE 15 MG/1
1 TABLET ORAL
Qty: 0 | Refills: 0 | DISCHARGE

## 2019-06-03 RX ORDER — DEXTROSE 50 % IN WATER 50 %
12.5 SYRINGE (ML) INTRAVENOUS ONCE
Refills: 0 | Status: DISCONTINUED | OUTPATIENT
Start: 2019-06-03 | End: 2019-06-05

## 2019-06-03 RX ORDER — ASPIRIN/CALCIUM CARB/MAGNESIUM 324 MG
1 TABLET ORAL
Qty: 0 | Refills: 0 | DISCHARGE

## 2019-06-03 RX ORDER — DEXTROSE 50 % IN WATER 50 %
25 SYRINGE (ML) INTRAVENOUS ONCE
Refills: 0 | Status: DISCONTINUED | OUTPATIENT
Start: 2019-06-03 | End: 2019-06-05

## 2019-06-03 RX ORDER — AMLODIPINE BESYLATE 2.5 MG/1
1 TABLET ORAL
Qty: 0 | Refills: 0 | DISCHARGE

## 2019-06-03 RX ORDER — RAMIPRIL 5 MG
1 CAPSULE ORAL
Qty: 0 | Refills: 0 | DISCHARGE

## 2019-06-03 RX ORDER — METOPROLOL TARTRATE 50 MG
50 TABLET ORAL
Refills: 0 | Status: DISCONTINUED | OUTPATIENT
Start: 2019-06-03 | End: 2019-06-05

## 2019-06-03 RX ORDER — THYROID 120 MG
15 TABLET ORAL DAILY
Refills: 0 | Status: DISCONTINUED | OUTPATIENT
Start: 2019-06-03 | End: 2019-06-05

## 2019-06-03 RX ORDER — DESMOPRESSIN ACETATE 0.1 MG/1
23 TABLET ORAL ONCE
Refills: 0 | Status: COMPLETED | OUTPATIENT
Start: 2019-06-03 | End: 2019-06-03

## 2019-06-03 RX ORDER — ACETAMINOPHEN 500 MG
975 TABLET ORAL ONCE
Refills: 0 | Status: COMPLETED | OUTPATIENT
Start: 2019-06-03 | End: 2019-06-03

## 2019-06-03 RX ORDER — LISINOPRIL 2.5 MG/1
40 TABLET ORAL DAILY
Refills: 0 | Status: DISCONTINUED | OUTPATIENT
Start: 2019-06-03 | End: 2019-06-05

## 2019-06-03 RX ORDER — LEVETIRACETAM 250 MG/1
500 TABLET, FILM COATED ORAL
Refills: 0 | Status: DISCONTINUED | OUTPATIENT
Start: 2019-06-03 | End: 2019-06-05

## 2019-06-03 RX ORDER — PROTHROMBIN COMPLEX CONCENTRATE (HUMAN) 25.5; 16.5; 24; 22; 22; 26 [IU]/ML; [IU]/ML; [IU]/ML; [IU]/ML; [IU]/ML; [IU]/ML
1500 POWDER, FOR SOLUTION INTRAVENOUS ONCE
Refills: 0 | Status: COMPLETED | OUTPATIENT
Start: 2019-06-03 | End: 2019-06-03

## 2019-06-03 RX ORDER — DEXTROSE 50 % IN WATER 50 %
15 SYRINGE (ML) INTRAVENOUS ONCE
Refills: 0 | Status: DISCONTINUED | OUTPATIENT
Start: 2019-06-03 | End: 2019-06-05

## 2019-06-03 RX ADMIN — Medication 102 MILLIGRAM(S): at 19:38

## 2019-06-03 RX ADMIN — DESMOPRESSIN ACETATE 23 MICROGRAM(S): 0.1 TABLET ORAL at 21:40

## 2019-06-03 RX ADMIN — Medication 975 MILLIGRAM(S): at 22:30

## 2019-06-03 RX ADMIN — PROTHROMBIN COMPLEX CONCENTRATE (HUMAN) 1500 INTERNATIONAL UNIT(S): 25.5; 16.5; 24; 22; 22; 26 POWDER, FOR SOLUTION INTRAVENOUS at 19:37

## 2019-06-03 RX ADMIN — PROTHROMBIN COMPLEX CONCENTRATE (HUMAN) 400 INTERNATIONAL UNIT(S): 25.5; 16.5; 24; 22; 22; 26 POWDER, FOR SOLUTION INTRAVENOUS at 19:28

## 2019-06-03 RX ADMIN — DESMOPRESSIN ACETATE 223 MICROGRAM(S): 0.1 TABLET ORAL at 21:23

## 2019-06-03 RX ADMIN — Medication 975 MILLIGRAM(S): at 21:23

## 2019-06-03 NOTE — ED PROVIDER NOTE - OBJECTIVE STATEMENT
87yo female PMH atrial fibrillation on coumadin, DM, HTN, myocardial infarction, on ASA presenting as transfer from OSH for traumatic subarachnoid hemorrhage and left distal radius fracture. patient states she was gardening today when she tripped over a rock, fell and hit head, no loss of consciousness. Initially brought to [facility] when she received K centra and Vitamin K. L wrist was splinted. No other injuries.

## 2019-06-03 NOTE — ED PROVIDER NOTE - MUSCULOSKELETAL, MLM
Spine appears normal, range of motion is not limited, (+) left wrist TTP, swelling, and ecchymosis. limited ROM. pulse intact.  intact. flexion of hip intact, no cervical/thoracic/lumbar spinal midline ttp

## 2019-06-03 NOTE — ED PROVIDER NOTE - ATTENDING CONTRIBUTION TO CARE
pt is a 87 y/o female with afib on coumadin, htn, high holli with mechanical fall transferred from [place] for small SAH, reversed with kcentra, distal radius fx to l wrist, gcs 15. pt made a level 2 trauma as she med criteria, ddavp, plt given to adimt to trauma, repeat scan, neurosurgery consulted.

## 2019-06-03 NOTE — ED ADULT NURSE REASSESSMENT NOTE - NS ED NURSE REASSESS COMMENT FT1
Platelet transfusion started. Pt educated on signs and symptoms of transfusion reactions, pt educated to make RN aware immediately if any of these occur. Pre-vital signs taken and documented in sunrise. Safety and comfort measures maintained.

## 2019-06-03 NOTE — ED ADULT TRIAGE NOTE - CHIEF COMPLAINT QUOTE
"I tripped and fall"  patient was gardening and tripped and fall, landed on her left side, c/o left wrist pain, shoulder

## 2019-06-03 NOTE — ED ADULT NURSE NOTE - OBJECTIVE STATEMENT
Cryotherapy Text: The wound bed was treated with cryotherapy after the biopsy was performed. tripped over a brick while gardening, left wrist pain, abrasions to right upper arm and left templs

## 2019-06-03 NOTE — ED PROVIDER NOTE - PROGRESS NOTE DETAILS
Tim Bailey: 87 y/o female fell today c/o left facial injury and left wrist injury. Denies LOC. Physical Exam: minor contusion left eyebrow, PEERL, EOMI, neck supple non-tender, Left wrist: moderate swollen and ecchymotic, limited ROM, pulses and sensation intact, remainder unremarkable. MDM: Plan CT head and X ray of wrist, and reassess. CT reveals small subarachnoid hemorrhage. INR 2.79. Plan - [facility], transfer to [facility] for neurosurgery eval.

## 2019-06-03 NOTE — ED PROVIDER NOTE - CLINICAL SUMMARY MEDICAL DECISION MAKING FREE TEXT BOX
87 y/o female with PMHx of Afib, Cancer of ureter, DM (diabetes mellitus), Hypertension, and MI (myocardial infarction) presents today c/o fall PTA. pt notes she tripped over a brick while in the garden. admits to head injury on left side of face in which caused minor abrasion. Admits to wrist pain, swelling, and bruising. pt admits to taking coumadin in which was checked 2 weeks ago and was 2.2. Admits to minor nausea but no vomiting. notes minor left hip pain, admits to ambulating well. notes she had hx of left hip surgery. Exam noted neuro intact, (+0 left periorbital TTP, (+) wrist ttp, swelling, and ecchymosis. Plan includes Ct head, ct maxillofacial, xray wrist, labs, re-assess.

## 2019-06-03 NOTE — ED ADULT NURSE NOTE - INTERVENTIONS DEFINITIONS
Call bell, personal items and telephone within reach/Monitor for mental status changes and reorient to person, place, and time/Provide visual cue, wrist band, yellow gown, etc./Provide visual clues: red socks/[place] to call system/Instruct patient to call for assistance/Physically safe environment: no spills, clutter or unnecessary equipment/Monitor gait and stability

## 2019-06-03 NOTE — CONSULT NOTE ADULT - SUBJECTIVE AND OBJECTIVE BOX
p ([phone])     HPI: 85yo female PMH atrial fibrillation on coumadin, DM, HTN, myocardial infarction, on ASA presenting as transfer from OSH for traumatic subarachnoid hemorrhage and left distal radius fracture. patient states she was gardening today when she tripped over a rock, fell and hit head, no loss of consciousness. Initially brought to [facility] when she received K centra and Vitamin K. L wrist was splinted. No other injuries.    PAST MEDICAL HISTORY   Cancer of ureter  MI (myocardial infarction)  DM (diabetes mellitus)  Afib  Hypertension    PAST SURGICAL HISTORY         MEDICATIONS:  Antibiotics:    Neuro:    Anticoagulation:    Other:      SOCIAL HISTORY:   Occupation:   Marital Status:     FAMILY HISTORY:      REVIEW OF SYSTEMS:  per hpi  General:  Eyes:  ENT:  Cardiac:  Respiratory:  GI:  Musculoskeletal:   Skin:  Neurologic:   Psychiatric:     PHYSICAL EXAMINATION:   T(C): 36.4 (06-03-19 @ 20:25), Max: 36.9 (06-03-19 @ 19:29)  HR: 70 (06-03-19 @ 20:25) (70 - 81)  BP: 184/61 (06-03-19 @ 20:25) (142/71 - 184/61)  RR: 16 (06-03-19 @ 20:25) (14 - 16)  SpO2: 99% (06-03-19 @ 20:25) (96% - 99%)  Wt(kg): --Height (cm): 152.4 (06-03 @ 20:25), 157.48 (06-03 @ 16:28)  Weight (kg): 56.7 (06-03 @ 20:25), 56.7 (06-03 @ 16:28)    General Examination:     Neurologic Examination:           AOx3, FC, PERRL, EOMI, no facial   5/5 throughout but for left wrist which is splinted, no drift  SILT  no clonus    LABS:                        13.6   15.0  )-----------( 239      ( 03 Jun 2019 21:13 )             40.6     06-03    132<L>  |  96  |  30<H>  ----------------------------<  158<H>  3.8   |  23  |  0.94    Ca    9.4      03 Jun 2019 21:12    TPro  7.0  /  Alb  4.4  /  TBili  0.4  /  DBili  x   /  AST  18  /  ALT  18  /  AlkPhos  65  06-03    PT/INR - ( 03 Jun 2019 21:12 )   PT: 12.2 sec;   INR: 1.07 ratio         PTT - ( 03 Jun 2019 21:12 )  PTT:29.4 sec      RADIOLOGY & ADDITIONAL STUDIES:  IMPRESSION:  New small acute subarachnoid hemorrhage.    No acute fracture facial bone    Dr. Barajas notified 6/3/2019 at 6:01 PM                  TRA BOYKIN M.D., ATTENDING RADIOLOGIST  This document has been electronically signed. Yoel  3 2019  6:08PM

## 2019-06-03 NOTE — ED PROVIDER NOTE - SKIN, MLM
Skin normal color for race, warm, dry and intact. (+) abrasion to left periorbital region with TTP. EOm intact.

## 2019-06-03 NOTE — ED PROVIDER NOTE - OBJECTIVE STATEMENT
87 y/o female with PMHx of Afib, Cancer of ureter, DM (diabetes mellitus), Hypertension, and MI (myocardial infarction) presents today c/o fall PTA. pt notes she tripped over a brick while in the garden. admits to head injury on left side of face in which caused minor abrasion. Admits to wrist pain, swelling, and bruising. pt admits to taking coumadin in which was checked 2 weeks ago and was 2.2. Admits to minor nausea but no vomiting. notes minor left hip pain, admits to ambulating well. notes she had hx of left hip surgery. pt denies dizziness, prodromal symptoms, numbness/weakness, LOC, photophobia, seizure, neck stiffness, or any other complaints.

## 2019-06-03 NOTE — ED PROVIDER NOTE - PHYSICAL EXAMINATION
Gen: NAD  Head: periorbital ecchymoses to left eye  HEENT: Pupils 5mm and reactive bilaterally, oral mucosa moist, normal conjunctiva  Lung: CTAB, no respiratory distress, no wheezing, rales, rhonchi  CV: normal s1/s2, rrr, no murmurs, Normal perfusion  Abd: soft, NTND  MSK: L wrist in volar splint, No edema, no visible deformities, full range of motion in all 4 extremities  Neuro: No focal neurologic deficits

## 2019-06-03 NOTE — ED ADULT NURSE NOTE - IN THE PAST 12 MONTHS HAVE YOU USED DRUGS OTHER THAN THOSE REQUIRED FOR MEDICAL REASON?
Brigitte Freeze 
 
 
 [address] [id number] 
[phone] Patient: Yumiko Cosme MRN: [id number] OZ Visit Information Date & Time Provider Department Dept. Phone Encounter #  
 10/16/2018  4:45 PM Noé Holloway [facility] [phone] [id number] Your Appointments 2019  9:45 AM  
Any with Rhona Campos MD  
[address] [place] [address] and Primary Care [address] [place] [address]) Appt Note: 3 Month follow up  
 [address] [facility]  
  
   
 [address] [id number] Upcoming Health Maintenance Date Due  
 FOOT EXAM Q1 1967 Shingrix Vaccine Age 50> (1 of 2) 2007 FOBT Q 1 YEAR AGE 50-75 2016 PAP AKA CERVICAL CYTOLOGY 2018 Influenza Age 5 to Adult 2018 HEMOGLOBIN A1C Q6M 2019 MICROALBUMIN Q1 2019 LIPID PANEL Q1 2019 EYE EXAM RETINAL OR DILATED Q1 2019 BREAST CANCER SCRN MAMMOGRAM 2020 DTaP/Tdap/Td series (2 - Td) 2027 Allergies as of 10/16/2018  Review Complete On: 10/16/2018 By: Trini Bradley Severity Noted Reaction Type Reactions Codeine  2014   Side Effect Hives Current Immunizations  Never Reviewed No immunizations on file. Not reviewed this visit Vitals BP Pulse Temp Resp Height(growth percentile) Weight(growth percentile) 144/88 82 98.4 °F (36.9 °C) (Oral) 16 5' 6\" (1.676 m) 214 lb 3.2 oz (97.2 kg) SpO2 BMI OB Status Smoking Status 95% 34.57 kg/m2 Postmenopausal Never Smoker Vitals History BMI and BSA Data Body Mass Index Body Surface Area 34.57 kg/m 2 2.13 m 2 Preferred Pharmacy Pharmacy Name Phone [id number] [phone] Your Updated Medication List  
  
   
 This list is accurate as of 10/16/18  5:49 PM.  Always use your most recent med list.  
  
  
  
  
 benazepril 40 mg tablet Commonly known as:  LOTENSIN  
TAKE 1 TABLET BY MOUTH EVERY DAY  
  
 glucose blood VI test strips strip Commonly known as:  FREESTYLE LITE STRIPS Test twice daily before meals breakfast and dinner  
  
 metFORMIN  mg tablet Commonly known as:  GLUCOPHAGE XR  
TAKE 2 TABLETS BY MOUTH TWICE A DAY WITH MEALS  
  
 NYSTOP powder Generic drug:  nystatin  
  
 valACYclovir 500 mg tablet Commonly known as:  VALTREX  
TAKE 1 TABLET BY MOUTH EVERY DAY Introducing [facility]! Janny Dodge introduces [employer] patient portal. Now you can access parts of your medical record, email your doctor's office, and request medication refills online. 1. In your internet browser, go to [email or web address]. [email or web address] 2 2. Click on the First Time User? Click Here link in the Sign In box. You will see the New Member Sign Up page. 3. Enter your [employer] Access Code exactly as it appears below. You will not need to use this code after youve completed the sign-up process. If you do not sign up before the expiration date, you must request a new code. · [employer] Access Code: [email or web address] Expires: 1/14/2019  4:56 PM 
 
4. Enter the last four digits of your Social Security Number (xxxx) and Date of Birth (mm/dd/yyyy) as indicated and click Submit. You will be taken to the next sign-up page. 5. Create a [facility] [facility] ID. This will be your [facility] [facility] login ID and cannot be changed, so think of one that is secure and easy to remember. 6. Create a [employer] [facility] password. You can change your password at any time. 7. Enter your Password Reset Question and Answer. This can be used at a later time if you forget your password. 8. Enter your e-mail address. You will receive e-mail notification when new information is available in [address] 9. Click Sign Up. You can now view and download portions of your medical record. 10. Click the Download Summary menu link to download a portable copy of your medical information. If you have questions, please visit the Frequently Asked Questions section of the [employer] website. Remember, [employer] is NOT to be used for urgent needs. For medical emergencies, dial 911. Now available from your iPhone and Android! Please provide this summary of care documentation to your next provider. Your primary care clinician is listed as Jonnathan Pedraza. If you have any questions after today's visit, please call [phone]. No

## 2019-06-03 NOTE — ED PROVIDER NOTE - ENMT, MLM
Airway patent, Nasal mucosa clear. Mouth with normal mucosa. Throat has no vesicles, no oropharyngeal exudates and uvula is midline. no bennett sign or raccoon eyes.

## 2019-06-03 NOTE — ED ADULT NURSE REASSESSMENT NOTE - NS ED NURSE REASSESS COMMENT FT1
Pt resting quietly on stretcher. Bed in lowest position. Red socks applied. Side rails up for safety. Awaiting admitting bed.

## 2019-06-03 NOTE — ED PROVIDER NOTE - CRANIAL NERVE AND PUPILLARY EXAM
extra-ocular movements intact/cranial nerves 2-12 intact/gag reflex intact/tongue is midline/cough reflex intact

## 2019-06-03 NOTE — ED ADULT NURSE NOTE - INTERVENTIONS DEFINITIONS
[place] to call system/Call bell, personal items and telephone within reach/Instruct patient to call for assistance/Provide visual clues: red socks/Monitor for mental status changes and reorient to person, place, and time/Stretcher in lowest position, wheels locked, appropriate side rails in place/Monitor gait and stability/Physically safe environment: no spills, clutter or unnecessary equipment/Reinforce activity limits and safety measures with patient and family

## 2019-06-04 ENCOUNTER — TRANSCRIPTION ENCOUNTER (OUTPATIENT)
Age: 84
End: 2019-06-04

## 2019-06-04 DIAGNOSIS — I48.0 PAROXYSMAL ATRIAL FIBRILLATION: ICD-10-CM

## 2019-06-04 DIAGNOSIS — I60.9 NONTRAUMATIC SUBARACHNOID HEMORRHAGE, UNSPECIFIED: ICD-10-CM

## 2019-06-04 DIAGNOSIS — I10 ESSENTIAL (PRIMARY) HYPERTENSION: ICD-10-CM

## 2019-06-04 DIAGNOSIS — Z29.9 ENCOUNTER FOR PROPHYLACTIC MEASURES, UNSPECIFIED: ICD-10-CM

## 2019-06-04 DIAGNOSIS — S52.90XA UNSPECIFIED FRACTURE OF UNSPECIFIED FOREARM, INITIAL ENCOUNTER FOR CLOSED FRACTURE: ICD-10-CM

## 2019-06-04 DIAGNOSIS — I25.10 ATHEROSCLEROTIC HEART DISEASE OF NATIVE CORONARY ARTERY WITHOUT ANGINA PECTORIS: ICD-10-CM

## 2019-06-04 DIAGNOSIS — Z79.899 OTHER LONG TERM (CURRENT) DRUG THERAPY: ICD-10-CM

## 2019-06-04 DIAGNOSIS — E11.9 TYPE 2 DIABETES MELLITUS WITHOUT COMPLICATIONS: ICD-10-CM

## 2019-06-04 PROBLEM — I48.91 UNSPECIFIED ATRIAL FIBRILLATION: Chronic | Status: ACTIVE | Noted: 2017-09-23

## 2019-06-04 PROBLEM — I21.3 ST ELEVATION (STEMI) MYOCARDIAL INFARCTION OF UNSPECIFIED SITE: Chronic | Status: ACTIVE | Noted: 2017-09-23

## 2019-06-04 PROBLEM — C66.9 MALIGNANT NEOPLASM OF UNSPECIFIED URETER: Chronic | Status: ACTIVE | Noted: 2017-09-23

## 2019-06-04 LAB
ANION GAP SERPL CALC-SCNC: 11 MMOL/L — SIGNIFICANT CHANGE UP (ref 5–17)
APTT BLD: 28.9 SEC — SIGNIFICANT CHANGE UP (ref 27.5–36.3)
BUN SERPL-MCNC: 26 MG/DL — HIGH (ref 7–23)
CALCIUM SERPL-MCNC: 8.7 MG/DL — SIGNIFICANT CHANGE UP (ref 8.4–10.5)
CHLORIDE SERPL-SCNC: 99 MMOL/L — SIGNIFICANT CHANGE UP (ref 96–108)
CO2 SERPL-SCNC: 25 MMOL/L — SIGNIFICANT CHANGE UP (ref 22–31)
CREAT SERPL-MCNC: 0.72 MG/DL — SIGNIFICANT CHANGE UP (ref 0.5–1.3)
GLUCOSE BLDC GLUCOMTR-MCNC: 134 MG/DL — HIGH (ref 70–99)
GLUCOSE BLDC GLUCOMTR-MCNC: 141 MG/DL — HIGH (ref 70–99)
GLUCOSE BLDC GLUCOMTR-MCNC: 171 MG/DL — HIGH (ref 70–99)
GLUCOSE BLDC GLUCOMTR-MCNC: 195 MG/DL — HIGH (ref 70–99)
GLUCOSE BLDC GLUCOMTR-MCNC: 208 MG/DL — HIGH (ref 70–99)
GLUCOSE SERPL-MCNC: 135 MG/DL — HIGH (ref 70–99)
HBA1C BLD-MCNC: 6.3 % — HIGH (ref 4–5.6)
HCT VFR BLD CALC: 32.3 % — LOW (ref 34.5–45)
HCT VFR BLD CALC: 33.9 % — LOW (ref 34.5–45)
HGB BLD-MCNC: 10.7 G/DL — LOW (ref 11.5–15.5)
HGB BLD-MCNC: 11.7 G/DL — SIGNIFICANT CHANGE UP (ref 11.5–15.5)
INR BLD: 1.19 RATIO — HIGH (ref 0.88–1.16)
MAGNESIUM SERPL-MCNC: 2.1 MG/DL — SIGNIFICANT CHANGE UP (ref 1.6–2.6)
MCHC RBC-ENTMCNC: 31.4 PG — SIGNIFICANT CHANGE UP (ref 27–34)
MCHC RBC-ENTMCNC: 32.7 PG — SIGNIFICANT CHANGE UP (ref 27–34)
MCHC RBC-ENTMCNC: 33.1 GM/DL — SIGNIFICANT CHANGE UP (ref 32–36)
MCHC RBC-ENTMCNC: 34.7 GM/DL — SIGNIFICANT CHANGE UP (ref 32–36)
MCV RBC AUTO: 94.4 FL — SIGNIFICANT CHANGE UP (ref 80–100)
MCV RBC AUTO: 94.7 FL — SIGNIFICANT CHANGE UP (ref 80–100)
PHOSPHATE SERPL-MCNC: 2.8 MG/DL — SIGNIFICANT CHANGE UP (ref 2.5–4.5)
PLATELET # BLD AUTO: 257 K/UL — SIGNIFICANT CHANGE UP (ref 150–400)
PLATELET # BLD AUTO: 284 K/UL — SIGNIFICANT CHANGE UP (ref 150–400)
POTASSIUM SERPL-MCNC: 3.7 MMOL/L — SIGNIFICANT CHANGE UP (ref 3.5–5.3)
POTASSIUM SERPL-SCNC: 3.7 MMOL/L — SIGNIFICANT CHANGE UP (ref 3.5–5.3)
PROTHROM AB SERPL-ACNC: 13.5 SEC — HIGH (ref 10–13.1)
RBC # BLD: 3.41 M/UL — LOW (ref 3.8–5.2)
RBC # BLD: 3.59 M/UL — LOW (ref 3.8–5.2)
RBC # FLD: 11.9 % — SIGNIFICANT CHANGE UP (ref 10.3–14.5)
RBC # FLD: 13.1 % — SIGNIFICANT CHANGE UP (ref 10.3–14.5)
SODIUM SERPL-SCNC: 135 MMOL/L — SIGNIFICANT CHANGE UP (ref 135–145)
WBC # BLD: 10.3 K/UL — SIGNIFICANT CHANGE UP (ref 3.8–10.5)
WBC # BLD: 9.01 K/UL — SIGNIFICANT CHANGE UP (ref 3.8–10.5)
WBC # FLD AUTO: 10.3 K/UL — SIGNIFICANT CHANGE UP (ref 3.8–10.5)
WBC # FLD AUTO: 9.01 K/UL — SIGNIFICANT CHANGE UP (ref 3.8–10.5)

## 2019-06-04 PROCEDURE — 70450 CT HEAD/BRAIN W/O DYE: CPT | Mod: 26

## 2019-06-04 PROCEDURE — 73100 X-RAY EXAM OF WRIST: CPT | Mod: 26,LT

## 2019-06-04 PROCEDURE — 73090 X-RAY EXAM OF FOREARM: CPT | Mod: 26,LT

## 2019-06-04 PROCEDURE — 99223 1ST HOSP IP/OBS HIGH 75: CPT

## 2019-06-04 RX ORDER — TRAMADOL HYDROCHLORIDE 50 MG/1
25 TABLET ORAL EVERY 4 HOURS
Refills: 0 | Status: DISCONTINUED | OUTPATIENT
Start: 2019-06-04 | End: 2019-06-05

## 2019-06-04 RX ORDER — TRAMADOL HYDROCHLORIDE 50 MG/1
50 TABLET ORAL EVERY 4 HOURS
Refills: 0 | Status: DISCONTINUED | OUTPATIENT
Start: 2019-06-04 | End: 2019-06-05

## 2019-06-04 RX ORDER — ACETAMINOPHEN 500 MG
650 TABLET ORAL EVERY 6 HOURS
Refills: 0 | Status: DISCONTINUED | OUTPATIENT
Start: 2019-06-04 | End: 2019-06-05

## 2019-06-04 RX ORDER — LIDOCAINE 4 G/100G
1 CREAM TOPICAL DAILY
Refills: 0 | Status: DISCONTINUED | OUTPATIENT
Start: 2019-06-04 | End: 2019-06-05

## 2019-06-04 RX ADMIN — LIDOCAINE 1 PATCH: 4 CREAM TOPICAL at 19:45

## 2019-06-04 RX ADMIN — Medication 15 MILLIGRAM(S): at 06:53

## 2019-06-04 RX ADMIN — Medication 650 MILLIGRAM(S): at 19:06

## 2019-06-04 RX ADMIN — Medication 50 MILLIGRAM(S): at 00:11

## 2019-06-04 RX ADMIN — SIMVASTATIN 20 MILLIGRAM(S): 20 TABLET, FILM COATED ORAL at 00:11

## 2019-06-04 RX ADMIN — LISINOPRIL 40 MILLIGRAM(S): 2.5 TABLET ORAL at 08:08

## 2019-06-04 RX ADMIN — AMLODIPINE BESYLATE 5 MILLIGRAM(S): 2.5 TABLET ORAL at 06:53

## 2019-06-04 RX ADMIN — LEVETIRACETAM 500 MILLIGRAM(S): 250 TABLET, FILM COATED ORAL at 18:36

## 2019-06-04 RX ADMIN — Medication 650 MILLIGRAM(S): at 11:50

## 2019-06-04 RX ADMIN — Medication 650 MILLIGRAM(S): at 11:20

## 2019-06-04 RX ADMIN — LIDOCAINE 1 PATCH: 4 CREAM TOPICAL at 11:20

## 2019-06-04 RX ADMIN — SIMVASTATIN 20 MILLIGRAM(S): 20 TABLET, FILM COATED ORAL at 21:18

## 2019-06-04 RX ADMIN — LEVETIRACETAM 500 MILLIGRAM(S): 250 TABLET, FILM COATED ORAL at 00:11

## 2019-06-04 RX ADMIN — Medication 1: at 13:14

## 2019-06-04 RX ADMIN — Medication 50 MILLIGRAM(S): at 18:36

## 2019-06-04 RX ADMIN — Medication 650 MILLIGRAM(S): at 18:36

## 2019-06-04 RX ADMIN — Medication 50 MILLIGRAM(S): at 06:53

## 2019-06-04 RX ADMIN — LEVETIRACETAM 500 MILLIGRAM(S): 250 TABLET, FILM COATED ORAL at 06:53

## 2019-06-04 NOTE — DISCHARGE NOTE PROVIDER - CARE PROVIDER_API CALL
Amparo Smith (MD)  Surgery; Surgical Critical Care  [address] [facility], [address] [address]  Phone: [phone]  Fax: [phone]  Follow Up Time: 1 week    Reggie Del Toro)  Orthopaedic Surgery  [address] [facility] [facility] [address]  [place] [address]  Phone: [phone]  Fax: [phone]  Follow Up Time: 1 week Amparo Smith)  Surgery; Surgical Critical Care  [address] [facility], [address], [place] [address]  Phone: [phone]  Fax: [phone]  Follow Up Time: 1 week    Reggie Del Toro)  Orthopaedic Surgery  [address] [facility], [address]  [place] [address]  Phone: [phone]  Fax: [phone]  Follow Up Time: 1 week    Amanda Sharma)  [facility] Neurosurgery  General  [address] [facility], [address]  [place] [address]  Phone: [phone]  Fax: [phone]  Follow Up Time: Reggie Del Toro)  Orthopaedic Surgery  [address] [facility], [address]  [place] [address]  Phone: [phone]  Fax: [phone]  Follow Up Time: 1 week    Amanda Sharma)  [facility] Neurosurgery  General  [address] [facility] [facility] [address]  [place] [address]  Phone: [phone]  Fax: [phone]  Follow Up Time: 1 week

## 2019-06-04 NOTE — PHYSICAL THERAPY INITIAL EVALUATION ADULT - ADDITIONAL COMMENTS
Pt lives alone in split level house. Was independent ambulator with no assistive device. Drives. Is very active.

## 2019-06-04 NOTE — CONSULT NOTE ADULT - PROBLEM SELECTOR RECOMMENDATION 2
-Orthopedics f/u appreciated  -Left wrist in splint  -Outpatient f/u  -Pain control with tylenol ATC, tramadol PRN - avoid narcotics as can increase risk for falls  -Will need outpatient osteoporosis screening and management - high suspicion given hx of hip fracture as well

## 2019-06-04 NOTE — H&P ADULT - HISTORY OF PRESENT ILLNESS
87yo female PMH atrial fibrillation on coumadin, DM, HTN, myocardial infarction, on ASA presenting as transfer from OSH for traumatic subarachnoid hemorrhage and left distal radius fracture. Patient states she was gardening today when she tripped over a rock, fell and hit head. Denies loss of consciousness. Initially taken to [facility] where she received K centra and Vitamin K. L wrist was splinted. No other injuries.

## 2019-06-04 NOTE — H&P ADULT - NSHPPHYSICALEXAM_GEN_ALL_CORE
Vital Signs Last 24 Hrs  T(C): 36.8 (03 Jun 2019 23:20), Max: 36.9 (03 Jun 2019 19:29)  T(F): 98.2 (03 Jun 2019 23:20), Max: 98.4 (03 Jun 2019 19:29)  HR: 70 (03 Jun 2019 23:20) (70 - 81)  BP: 149/52 (03 Jun 2019 23:20) (137/57 - 184/61)  BP(mean): --  RR: 18 (03 Jun 2019 23:20) (14 - 21)  SpO2: 98% (03 Jun 2019 23:20) (96% - 100%)    Primary survey:  Airway intact, patient able to speak clearly  Bilateral breath sounds; CTAB;  Circulation: HR/BP WNL, mentating well, extremities well perfused  Able to move all 4 extremities; follows commands    Secondary survey:  HEENT: NCAT, PERRL, GCS 15  Chest: CTAB, rrr, no m/r/g  Abd: soft, NT, ND  Back: no midline tenderness  Pelvis: stable  Ext: LUE with ttp, able to move fingers; sensation intact; FROM strength and sensation on other extremities Vital Signs Last 24 Hrs  T(C): 36.8 (03 Jun 2019 23:20), Max: 36.9 (03 Jun 2019 19:29)  T(F): 98.2 (03 Jun 2019 23:20), Max: 98.4 (03 Jun 2019 19:29)  HR: 70 (03 Jun 2019 23:20) (70 - 81)  BP: 149/52 (03 Jun 2019 23:20) (137/57 - 184/61)  BP(mean): --  RR: 18 (03 Jun 2019 23:20) (14 - 21)  SpO2: 98% (03 Jun 2019 23:20) (96% - 100%)    Primary survey:  Airway intact, patient able to speak clearly  Bilateral breath sounds; CTAB;  Circulation: HR/BP WNL, mentating well, extremities well perfused  Able to move all 4 extremities; follows commands    Secondary survey:  HEENT: NCAT, PERRL, GCS 15, left orbital ecchymosis  Chest: CTAB, rrr, no m/r/g  Abd: soft, NT, ND  Back: no midline tenderness  Pelvis: stable  Ext: LUE with ttp, able to move fingers; sensation intact; FROM strength and sensation on other extremities

## 2019-06-04 NOTE — H&P ADULT - ATTENDING COMMENTS
Patient seen and examined as part of Level Two Trauma Team Activation response on 6/3/19  s/p fall  Patient on coumadin for a.fib  Originally brought to [facility] where imaging showed small SAH and left wrist fracture.  Patient given Kcentra and vitamin K at [place].  Transferred to [facility]    On arrival in ED   Primary survey intact (GCS=15)  + small scalp abrasion  + left wrist in splint  Repeat INR=WNL  Repeat CT-head without change    - Admit for PT consult, safe discharge planning  - Orthopedics / neurosurgery consults appreciated

## 2019-06-04 NOTE — CHART NOTE - NSCHARTNOTEFT_GEN_A_CORE
TERTIARY TRAUMA SURVEY  ------------------------------------------------------------------------------------------    Date of TTS: 19  Admit Date: 6/3/19  Trauma Activation: level II  Admit GCS:  15    HPI:  87yo female PMH atrial fibrillation on coumadin, DM, HTN, myocardial infarction, on ASA presenting as transfer from OSH for traumatic subarachnoid hemorrhage and left distal radius fracture. Patient states she was gardening today when she tripped over a rock, fell and hit head. Denies loss of consciousness. Initially taken to [facility] where she received K centra and Vitamin K. L wrist was splinted. No other injuries. (2019 01:24)      PAST MEDICAL & SURGICAL HISTORY:  Cancer of ureter  MI (myocardial infarction)  DM (diabetes mellitus)  Afib  Hypertension    [] No significant past history as reviewed with the patient and family    FAMILY HISTORY:    [] Family history not pertinent as reviewed with the patient and family        ALLERGIES: No Known Allergies        CURRENT MEDICATIONS:   MEDICATIONS (STANDING): acetaminophen   Tablet .. 650 milliGRAM(s) Oral every 6 hours  amLODIPine   Tablet 5 milliGRAM(s) Oral daily  dextrose 5%. 1000 milliLiter(s) IV Continuous <Continuous>  dextrose 50% Injectable 12.5 Gram(s) IV Push once  dextrose 50% Injectable 25 Gram(s) IV Push once  dextrose 50% Injectable 25 Gram(s) IV Push once  insulin lispro (HumaLOG) corrective regimen sliding scale   SubCutaneous three times a day before meals  insulin lispro (HumaLOG) corrective regimen sliding scale   SubCutaneous at bedtime  levETIRAcetam 500 milliGRAM(s) Oral two times a day  lisinopril 40 milliGRAM(s) Oral daily  metoprolol tartrate 50 milliGRAM(s) Oral two times a day  simvastatin 20 milliGRAM(s) Oral at bedtime  thyroid 15 milliGRAM(s) Oral daily    MEDICATIONS (PRN):dextrose 40% Gel 15 Gram(s) Oral once PRN Blood Glucose LESS THAN 70 milliGRAM(s)/deciliter  glucagon  Injectable 1 milliGRAM(s) IntraMuscular once PRN Glucose LESS THAN 70 milligrams/deciliter  traMADol 25 milliGRAM(s) Oral every 4 hours PRN Moderate Pain (4 - 6)  traMADol 50 milliGRAM(s) Oral every 4 hours PRN Severe Pain (7 - 10)    ------------------------------------------------------------------------------------------    VITAL SIGNS  T(C): 36.8 (19 @ 13:34), Max: 36.9 (19 @ 19:29)  HR: 62 (19 @ 13:34) (56 - 81)  BP: 151/63 (19 @ 13:34) (137/57 - 184/61)  RR: 18 (19 @ 13:34) (14 - 21)  SpO2: 99% (19 @ 13:34) (95% - 100%)  CAPILLARY BLOOD GLUCOSE      POCT Blood Glucose.: 195 mg/dL (2019 12:23)  POCT Blood Glucose.: 134 mg/dL (2019 08:54)  POCT Blood Glucose.: 171 mg/dL (2019 00:22)      INS/OUTS:     @ 07:  -   @ 07:00  --------------------------------------------------------  IN:    Oral Fluid: 200 mL  Total IN: 200 mL    OUT:    Voided: 300 mL  Total OUT: 300 mL    Total NET: -100 mL       @ 07:01  -   @ 15:02  --------------------------------------------------------  IN:    Oral Fluid: 700 mL  Total IN: 700 mL    OUT:  Total OUT: 0 mL    Total NET: 700 mL        Drug Dosing Weight  Height (cm): 152.4 (2019 20:25)  Weight (kg): 56.7 (2019 20:25)  BMI (kg/m2): 24.4 (2019 20:25)  BSA (m2): 1.53 (2019 20:25)    PHYSICAL EXAM:   General: NAD, Sitting in bed comfortably  HEENT: NC/AT, EOMI, small amount of ecchymosis left temporal region   Neck: Soft, supple  Cardio: RRR, nml S1/S2  Resp: Good effort, CTA b/l  Thorax: No chest wall tenderness  Breast: No lesions/masses, no drainage  GI/Abd: Soft, NT/ND, no rebound/guarding, no masses palpated  Vascular: Extremities warm, brisk cap refill, B/l radial pulses palpable, b/l DP/PT palpable, no palpable abdominal pulsatile mass  Skin: small ecchymosis at left elbow   Lymphatic/Nodes: No palpable lymphadenopathy  Musculoskeletal: LUE in splint, no numbness or tingling, RUE, RLE, LLE 5/5 strength, sensation intact   ------------------------------------------------------------------------------------------    LABS  CBC ( @ 11:54)                              11.7                           10.3    )----------------(  284        --    % Neutrophils, --    % Lymphocytes, ANC: --                                  33.9<L>  CBC ( @ 10:15)                              10.7<L>                         9.01    )----------------(  257        --    % Neutrophils, --    % Lymphocytes, ANC: --                                  32.3<L>    BMP ( @ 07:57)             135     |  99      |  26<H> 		Ca++ --      Ca 8.7                ---------------------------------( 135<H>		Mg 2.1                3.7     |  25      |  0.72  			Ph 2.8     BMP ( @ 21:12)             132<L>  |  96      |  30<H> 		Ca++ --      Ca 9.4                ---------------------------------( 158<H>		Mg --                 3.8     |  23      |  0.94  			Ph --        LFTs ( @ 21:12)      TPro 7.0 / Alb 4.4 / TBili 0.4 / DBili -- / AST 18 / ALT 18 / AlkPhos 65  LFTs ( @ 18:26)      TPro 7.2 / Alb 3.8 / TBili 0.4 / DBili -- / AST 25 / ALT 24 / AlkPhos 71    Coags ( @ 10:15)  aPTT 28.9 / INR 1.19<H> / PT 13.5<H>  Coags ( @ 21:12)  aPTT 29.4 / INR 1.07 / PT 12.2        VBG ( @ 21:13)     -- / -- / -- / -- / -- / --%     Lactate: 1.5      MICROBIOLOGY  Urinalysis ( @ 22:42):     Color: Colorless / Appearance: Clear / S.015 / pH: 6.0 / Gluc: Negative / Ketones: Trace / Bili: Negative / Urobili: Negative / Protein :30 mg/dL<!> / Nitrites: Negative / Leuk.Est: Negative / RBC: 5<H> / WBC: 4 / Sq Epi:  / Non Sq Epi: 0 / Bacteria Negative         ------------------------------------------------------------------------------------------    RADIOLOGICAL FINDINGS REVIEW:     Extremity Films: < from: Xray Forearm, Left (19 @ 05:35) >    INTERPRETATION:  Clinical information: Status post casting of the left   distal radius fracture.    2 views of the left forearm are compared to left forearm arm radiographs   from earlier the same day.    Impression: There has been interval placement of a cast which obscures   osseous detail. The alignment is grossly unchanged.        Head CT: < from: CT Head No Cont (19 @ 08:45) >    IMPRESSION:    No interval change in resolving trace subarachnoid hemorrhage along the   right frontotemporal region, redemonstration of volume loss and   microvascular disease, no new acute hemorrhage or midline shift. If   symptoms persist consider follow-up head CT or MR if no contraindications    CT MaxilloFacial: < from: CT Maxillofacial No Cont (19 @ 17:43) >    IMPRESSION:  New small acute subarachnoid hemorrhage.    No acute fracture facial bone          List Injuries Identified to Date:    Subarachnoid hemorrhage  Left distal radial fx     List Operative and Interventional Radiological Procedures:        Consults (Date):   [x] Neurosurgery: 19 - c/w Keppra 5days BID  [] Orthopedic Surgery  [] Spine Surgery  [] Plastic Surgery  [] ENT  [] Urology  [] PM&R  [] Social Work    INTERPRETATION:

## 2019-06-04 NOTE — H&P ADULT - NSICDXPASTMEDICALHX_GEN_ALL_CORE_FT
PAST MEDICAL HISTORY:  Afib     Cancer of ureter     DM (diabetes mellitus)     Hypertension     MI (myocardial infarction)

## 2019-06-04 NOTE — CONSULT NOTE ADULT - SUBJECTIVE AND OBJECTIVE BOX
TRAUMA/ACUTE CARE SURGERY - HOSPITAL MEDICINE CO-MANAGEMENT INITIAL VISIT NOTE    PMD: Dr. Robin Walker    CHIEF COMPLAINT: Patient is a 86y old  Female who presents with a chief complaint of Fall    HPI: Ms. Olson is a 86 year old woman with a PMHx significant for atrial fibrillation on coumadin, CAD s/p MI in  s/p PCI on ASA, T2DM, HTN, who presented overnight as a transfer from OSH for traumatic subarachnoid hemorrhage and left distal radius fracture. Patient states she was gardening yesterday when she tripped over a brick in her driveway, fell and hit head. Denies loss of consciousness. She was able to get up unassisted and walked inside her home. She noted some wrist pain, put some ice on it, but the pain worsened. She also noted some bleeding from the left side of her face. Her son took her to [facility] where she received K centra and Vitamin K. L wrist was splinted and she was transferred here for neurosurgical evaluation.    The patient denies personal history of falls. She is independent in her ADLs, and ambulates without assistance at home, where she lives alone. There have been no recent changes to her medications. Denies chest pain, SOB, palpitations, nausea, vomiting, diarrhea, decreased oral intake, or fever/chills.    Allergies: No Known Allergies    Intolerances: Denies    HOME MEDICATIONS: [X] Reviewed with patient  Home Medications:  amLODIPine 5 mg oral tablet: 1 tab(s) orally once a day (2019 16:38)  [place] Thyroid 15 mg oral tablet: 1 tab(s) orally once a day (2019 16:38)  aspirin 81 mg oral delayed release tablet: 1 tab(s) orally once a day (2019 16:38)  glipzide: 2.5 milligram(s)  once a day (2019 16:38)  Metoprolol Tartrate 50 mg oral tablet: 1 tab(s) orally 2 times a day (2019 23:06)  pioglitazone 15 mg oral tablet: 1 tab(s) orally once a day (2019 16:38)  ramipril 10 mg oral capsule: 1  orally once a day (2019 23:05)  simvastatin 20 mg oral tablet: 1 tab(s) orally once a day (at bedtime) (2019 16:38)  warfarin 5 mg oral tablet: 1 tab(s) orally once a day (2019 16:38)      MEDICATIONS  (STANDING):  acetaminophen   Tablet .. 650 milliGRAM(s) Oral every 6 hours  amLODIPine   Tablet 5 milliGRAM(s) Oral daily  dextrose 5%. 1000 milliLiter(s) (50 mL/Hr) IV Continuous <Continuous>  dextrose 50% Injectable 12.5 Gram(s) IV Push once  dextrose 50% Injectable 25 Gram(s) IV Push once  dextrose 50% Injectable 25 Gram(s) IV Push once  insulin lispro (HumaLOG) corrective regimen sliding scale   SubCutaneous three times a day before meals  insulin lispro (HumaLOG) corrective regimen sliding scale   SubCutaneous at bedtime  levETIRAcetam 500 milliGRAM(s) Oral two times a day  lidocaine   Patch 1 Patch Transdermal daily  lisinopril 40 milliGRAM(s) Oral daily  metoprolol tartrate 50 milliGRAM(s) Oral two times a day  simvastatin 20 milliGRAM(s) Oral at bedtime  thyroid 15 milliGRAM(s) Oral daily    MEDICATIONS  (PRN):  dextrose 40% Gel 15 Gram(s) Oral once PRN Blood Glucose LESS THAN 70 milliGRAM(s)/deciliter  glucagon  Injectable 1 milliGRAM(s) IntraMuscular once PRN Glucose LESS THAN 70 milligrams/deciliter  traMADol 25 milliGRAM(s) Oral every 4 hours PRN Moderate Pain (4 - 6)  traMADol 50 milliGRAM(s) Oral every 4 hours PRN Severe Pain (7 - 10)      PAST MEDICAL & SURGICAL HISTORY:  Cancer of ureter  CAD s/p MI (myocardial infarction)  DM (diabetes mellitus)  Afib  Hypertension  Hypothyroidism  History of hysterectomy  History of left hip fracture with surgical repair in   [X] Reviewed     Functional Assessment: [X] Independent  [ ] Assistance  [ ] Total care  [ ] Non-ambulatory    SOCIAL HISTORY:  [facility]  [X] Community  [ ] Substance abuse:   [ ] Tobacco:   [ ] Alcohol use:     FAMILY HISTORY:  [ ] No pertinent family history in first degree relatives     REVIEW OF SYSTEMS:    CONSTITUTIONAL: No fever, weight loss, or fatigue  EYES: No eye pain, visual disturbances, or discharge  ENMT:  No difficulty hearing, tinnitus, vertigo; No sinus or throat pain  NECK: No pain or stiffness  BREASTS: No pain, masses, or nipple discharge  RESPIRATORY: No cough, wheezing, chills or hemoptysis; No shortness of breath  CARDIOVASCULAR: No chest pain, palpitations, dizziness, or leg swelling  GASTROINTESTINAL: No abdominal or epigastric pain. No nausea, vomiting, or hematemesis; No diarrhea or constipation. No melena or hematochezia.  GENITOURINARY: No dysuria, frequency, hematuria, or incontinence  NEUROLOGICAL: No headaches, memory loss, loss of strength, numbness, or tremors  SKIN: No itching, burning, rashes, or lesions   LYMPH NODES: No enlarged glands  ENDOCRINE: No heat or cold intolerance; No hair loss  MUSCULOSKELETAL: No muscle or back pain  PSYCHIATRIC: No depression, anxiety, mood swings, or difficulty sleeping  HEME/LYMPH: No easy bruising, or bleeding gums  ALLERGY AND IMMUNOLOGIC: No hives or eczema    [  ] All other ROS negative  [  ] Unable to obtain due to poor mental status    PHYSICAL EXAM:    Vital Signs Last 24 Hrs  T(C): 36.6 (2019 08:56), Max: 36.9 (2019 19:29)  T(F): 97.9 (2019 08:56), Max: 98.4 (2019 19:29)  HR: 66 (2019 10:28) (56 - 81)  BP: 164/49 (2019 10:28) (137/57 - 184/61)  BP(mean): --  RR: 18 (2019 08:56) (14 - 21)  SpO2: 98% (2019 08:56) (95% - 100%)    GENERAL: NAD, well-groomed, well-developed  HEAD:  Atraumatic, Normocephalic  EYES: EOMI, PERRLA, conjunctiva and sclera clear  ENMT: Moist mucous membranes  NECK: Supple, No JVD  RESPIRATORY: Clear to auscultation bilaterally; No rales, rhonchi, wheezing, or rubs  CARDIOVASCULAR: Regular rate and rhythm; No murmurs, rubs, or gallops  GASTROINTESTINAL: Soft, Nontender, Nondistended; Bowel sounds present  GENITOURINARY: Not examined  EXTREMITIES:  2+ Peripheral Pulses, No clubbing, cyanosis, or edema  NERVOUS SYSTEM:  Alert & Oriented X3; Moving all 4 extremities; No gross sensory deficits  HEME/LYMPH: No lymphadenopathy noted  SKIN: No rashes or lesions; Incisions C/D/I    LABS:                        11.7   10.3  )-----------( 284      ( 2019 11:54 )             33.9     Hemoglobin: 11.7 g/dL ( @ 11:54)  Hemoglobin: 10.7 g/dL ( @ 10:15)  Hemoglobin: 13.6 g/dL ( @ 21:13)  Hemoglobin: 13.3 g/dL ( @ 18:26)        135  |  99  |  26<H>  ----------------------------<  135<H>  3.7   |  25  |  0.72    Ca    8.7      2019 07:57  Phos  2.8       Mg     2.1         TPro  7.0  /  Alb  4.4  /  TBili  0.4  /  DBili  x   /  AST  18  /  ALT  18  /  AlkPhos  65      PT/INR - ( 2019 10:15 )   PT: 13.5 sec;   INR: 1.19 ratio         PTT - ( 2019 10:15 )  PTT:28.9 sec  Urinalysis Basic - ( 2019 22:42 )    Color: Colorless / Appearance: Clear / S.015 / pH: x  Gluc: x / Ketone: Trace  / Bili: Negative / Urobili: Negative   Blood: x / Protein: 30 mg/dL / Nitrite: Negative   Leuk Esterase: Negative / RBC: 5 /hpf / WBC 4 /HPF   Sq Epi: x / Non Sq Epi: 0 /hpf / Bacteria: Negative      CAPILLARY BLOOD GLUCOSE      POCT Blood Glucose.: 195 mg/dL (2019 12:23)        RADIOLOGY & ADDITIONAL STUDIES:    EKG:   Personally Reviewed:  [ ] YES     Imaging:   Personally Reviewed:  [ ] YES               [ ] Consultant(s) Notes Reviewed  [x] Care Discussed with Consultants/Other Providers: Trauma Team    [ ] Fall risks identified:    [ ] High risk medications identified:    [ ] Probable osteoporosis    [ ] Possible osteomalacia    [ ] Increased delirium risk    [ ] Delirium and other risks can be reduced by:          -early ambulation          -minimizing "tethers" - IV, oxygen, catheters, etc          -avoiding hypnotics and sedatives          -maintaining hydration/nutrition          -avoid anticholinergics - diphenhydramine, etc          -pain control          -supportive environment    Advanced Directives: [ ] DNR  [ ] No feeding tube  [ ] MOLST in chart  [ ] MOLST completed today  [ ] Unknown TRAUMA/ACUTE CARE SURGERY - HOSPITAL MEDICINE CO-MANAGEMENT INITIAL VISIT NOTE    PMD: Dr. Robin Walker    CHIEF COMPLAINT: Patient is a 86y old  Female who presents with a chief complaint of Fall    HPI: Ms. Olson is a 86 year old woman with a PMHx significant for atrial fibrillation on coumadin, CAD s/p MI in  s/p PCI on ASA, T2DM, HTN, who presented overnight as a transfer from OSH for traumatic subarachnoid hemorrhage and left distal radius fracture. Patient states she was gardening yesterday when she tripped over a brick in her driveway, fell and hit head. Denies loss of consciousness. She was able to get up unassisted and walked inside her home. She noted some wrist pain, put some ice on it, but the pain worsened. She also noted some bleeding from the left side of her face. Her son took her to [facility] where she received K centra and Vitamin K. L wrist was splinted and she was transferred here for neurosurgical evaluation.    The patient denies personal history of falls. She is independent in her ADLs, and ambulates without assistance at home, where she lives alone. There have been no recent changes to her medications. Denies chest pain, SOB, palpitations, nausea, vomiting, diarrhea, decreased oral intake, or fever/chills.    Allergies: No Known Allergies    Intolerances: Denies    HOME MEDICATIONS: [X] Reviewed with patient  Home Medications:  amLODIPine 5 mg oral tablet: 1 tab(s) orally once a day (2019 16:38)  [place] Thyroid 15 mg oral tablet: 1 tab(s) orally once a day (2019 16:38)  aspirin 81 mg oral delayed release tablet: 1 tab(s) orally once a day (2019 16:38)  glipzide: 2.5 milligram(s)  once a day (2019 16:38)  Metoprolol Tartrate 50 mg oral tablet: 1 tab(s) orally 2 times a day (2019 23:06)  pioglitazone 15 mg oral tablet: 1 tab(s) orally once a day (2019 16:38)  ramipril 10 mg oral capsule: 1  orally once a day (2019 23:05)  simvastatin 20 mg oral tablet: 1 tab(s) orally once a day (at bedtime) (2019 16:38)  warfarin 5 mg oral tablet: 1 tab(s) orally once a day (2019 16:38)      MEDICATIONS  (STANDING):  acetaminophen   Tablet .. 650 milliGRAM(s) Oral every 6 hours  amLODIPine   Tablet 5 milliGRAM(s) Oral daily  dextrose 5%. 1000 milliLiter(s) (50 mL/Hr) IV Continuous <Continuous>  dextrose 50% Injectable 12.5 Gram(s) IV Push once  dextrose 50% Injectable 25 Gram(s) IV Push once  dextrose 50% Injectable 25 Gram(s) IV Push once  insulin lispro (HumaLOG) corrective regimen sliding scale   SubCutaneous three times a day before meals  insulin lispro (HumaLOG) corrective regimen sliding scale   SubCutaneous at bedtime  levETIRAcetam 500 milliGRAM(s) Oral two times a day  lidocaine   Patch 1 Patch Transdermal daily  lisinopril 40 milliGRAM(s) Oral daily  metoprolol tartrate 50 milliGRAM(s) Oral two times a day  simvastatin 20 milliGRAM(s) Oral at bedtime  thyroid 15 milliGRAM(s) Oral daily    MEDICATIONS  (PRN):  dextrose 40% Gel 15 Gram(s) Oral once PRN Blood Glucose LESS THAN 70 milliGRAM(s)/deciliter  glucagon  Injectable 1 milliGRAM(s) IntraMuscular once PRN Glucose LESS THAN 70 milligrams/deciliter  traMADol 25 milliGRAM(s) Oral every 4 hours PRN Moderate Pain (4 - 6)  traMADol 50 milliGRAM(s) Oral every 4 hours PRN Severe Pain (7 - 10)      PAST MEDICAL & SURGICAL HISTORY:  Cancer of ureter  CAD s/p MI (myocardial infarction)  DM (diabetes mellitus)  Afib  Hypertension  Hypothyroidism  History of hysterectomy  History of left hip fracture with surgical repair in   [X] Reviewed     Functional Assessment: [X] Independent  [ ] Assistance  [ ] Total care  [ ] Non-ambulatory    SOCIAL HISTORY:  Residence: [ ] [facility]  [ ] SNF  [X] Community  [ ] Substance abuse: Denies history of  [ ] Tobacco: Denies history of  [ ] Alcohol use: Denies history of    FAMILY HISTORY:  [X] No pertinent family history in first degree relatives of recurrent falls, premature fractures or bleeding disorders    REVIEW OF SYSTEMS:    CONSTITUTIONAL: No fever, weight loss, or fatigue  EYES: No eye pain, visual disturbances, or discharge  ENMT:  No difficulty hearing, tinnitus, vertigo; No sinus or throat pain  NECK: No pain or stiffness  RESPIRATORY: No cough, wheezing, chills or hemoptysis; No shortness of breath  CARDIOVASCULAR: No chest pain, palpitations, dizziness, or leg swelling  GASTROINTESTINAL: No abdominal or epigastric pain. No nausea, vomiting, or hematemesis; No diarrhea or constipation. No melena or hematochezia.  GENITOURINARY: No dysuria, frequency, hematuria, or incontinence  NEUROLOGICAL: No headaches, memory loss, loss of strength, numbness, or tremors  SKIN: No itching, burning, rashes, or lesions   LYMPH NODES: No enlarged glands  ENDOCRINE: No heat or cold intolerance; No hair loss  MUSCULOSKELETAL: No muscle or back pain. L wrist splinted.  PSYCHIATRIC: No depression, anxiety, mood swings, or difficulty sleeping  HEME/LYMPH: No easy bruising, or bleeding gums  ALLERGY AND IMMUNOLOGIC: No hives or eczema    [  ] All other ROS negative  [  ] Unable to obtain due to poor mental status    PHYSICAL EXAM:    Vital Signs Last 24 Hrs  T(C): 36.6 (2019 08:56), Max: 36.9 (2019 19:29)  T(F): 97.9 (2019 08:56), Max: 98.4 (2019 19:29)  HR: 66 (2019 10:28) (56 - 81)  BP: 164/49 (2019 10:28) (137/57 - 184/61)  BP(mean): --  RR: 18 (2019 08:56) (14 - 21)  SpO2: 98% (2019 08:56) (95% - 100%)    GENERAL: Elderly woman in NAD, well-groomed, well-developed  HEAD:  Bruising around left orbit and scalp without palpable hematoma. Small abrasion noted, non-bleeding.  EYES: EOMI, conjunctiva and sclera clear. + b/l arcus senilus.  ENMT: Moist mucous membranes  NECK: Supple, No JVD  RESPIRATORY: Clear to auscultation bilaterally; No rales, rhonchi, wheezing, or rubs  CARDIOVASCULAR: Regular rate and rhythm; No murmurs, rubs, or gallops  GASTROINTESTINAL: Soft, Nontender, Nondistended; Bowel sounds present  EXTREMITIES:  2+ Peripheral Pulses, No clubbing, cyanosis, or edema  NERVOUS SYSTEM:  Alert & Oriented X3; Moving all 4 extremities; No gross sensory deficits  MSK: Left wrist in splint. Full ROM noted at left shoulder  HEME/LYMPH: No lymphadenopathy noted  SKIN: Ecchymosis noted over left elbow    LABS:                        11.7   10.3  )-----------( 284      ( 2019 11:54 )             33.9     Hemoglobin: 11.7 g/dL ( @ 11:54)  Hemoglobin: 10.7 g/dL ( @ 10:15)  Hemoglobin: 13.6 g/dL ( @ 21:13)  Hemoglobin: 13.3 g/dL ( @ 18:26)    -    135  |  99  |  26<H>  ----------------------------<  135<H>  3.7   |  25  |  0.72    Ca    8.7      2019 07:57  Phos  2.8     -  Mg     2.1     -    TPro  7.0  /  Alb  4.4  /  TBili  0.4  /  DBili  x   /  AST  18  /  ALT  18  /  AlkPhos  65  06-    PT/INR - ( 2019 10:15 )   PT: 13.5 sec;   INR: 1.19 ratio         PTT - ( 2019 10:15 )  PTT:28.9 sec  Urinalysis Basic - ( 2019 22:42 )    Color: Colorless / Appearance: Clear / S.015 / pH: x  Gluc: x / Ketone: Trace  / Bili: Negative / Urobili: Negative   Blood: x / Protein: 30 mg/dL / Nitrite: Negative   Leuk Esterase: Negative / RBC: 5 /hpf / WBC 4 /HPF   Sq Epi: x / Non Sq Epi: 0 /hpf / Bacteria: Negative      CAPILLARY BLOOD GLUCOSE      POCT Blood Glucose.: 195 mg/dL (2019 12:23)        RADIOLOGY & ADDITIONAL STUDIES:    EKG:   Personally Reviewed:  [X] YES - NSR at 70 bpm without acute ST T changes    Imaging:   Personally Reviewed:  [x] YES   CTH : Stable small subarachnoid hemorrhage in frontotemporal area. Age related degeneration of cortex.  L wrist XR 6/3: Fracture of distal left radius              [x] Consultant(s) Notes Reviewed - Orthopedics  [x] Care Discussed with Consultants/Other Providers: Trauma Team - Dr. Smith - regarding anticoagulation regimen    [x] Fall risks identified: Advanced age, history of hip fracture    [x] High risk medications identified: Glipizide (outpatient), coumadin    [X] Probable osteoporosis    [ ] Possible osteomalacia    [X] Increased delirium risk    [X] Delirium and other risks can be reduced by:          -early ambulation          -minimizing "tethers" - IV, oxygen, catheters, etc          -avoiding hypnotics and sedatives          -maintaining hydration/nutrition          -avoid anticholinergics - diphenhydramine, etc          -pain control          -supportive environment

## 2019-06-04 NOTE — CONSULT NOTE ADULT - PROBLEM SELECTOR RECOMMENDATION 6
-OK to hold ASA at this time - PCI was remote, and risks outweight benefits at this time  -EKG non-ischemic in nature  -Continue statin  -Outpatient cardiology f/u

## 2019-06-04 NOTE — DISCHARGE NOTE PROVIDER - HOSPITAL COURSE
87yo female PMH atrial fibrillation on coumadin, DM, HTN, myocardial infarction, on ASA presented as transfer from OSH for traumatic subarachnoid hemorrhage and left distal radius fracture.  Orthopedics recommended LUE splint and NWB to LUE.  Neurosurgery recommended follow up head CT and Keppra for 5 days.  Coumadin was held an reversed. Patient was given platelets and DDAVP.  Head CT remained stable.  Physical therapy evaluated the patient and did not find any needs.  On day of discharge, the patient was tolerating diet, ambulating well and pain controlled. She will follow up with orthopedics and neurosurgery as outpatient. 85yo female PMH atrial fibrillation on coumadin, DM, HTN, myocardial infarction, on ASA presented as transfer from OSH for traumatic subarachnoid hemorrhage and left distal radius fracture.  Orthopedics recommended LUE splint and NWB to LUE.  Neurosurgery recommended follow up head CT and Keppra for 5 days.  Coumadin was held an reversed. Patient was given platelets and DDAVP.  Head CT remained stable.  Physical therapy evaluated the patient and did not find any needs. Occupational therapy consulted on pt recommended no needs and d/c home with outpatient OT upon MD discretion post removal of LUE splint.     On day of discharge, the patient was tolerating diet, ambulating well and pain controlled. She will follow up with orthopedics and neurosurgery as outpatient. 85yo female PMH atrial fibrillation on coumadin, DM, HTN, myocardial infarction, on ASA presented as transfer from OSH for traumatic subarachnoid hemorrhage and left distal radius fracture.  Orthopedics recommended LUE splint and NWB to LUE.  Neurosurgery recommended follow up head CT and Keppra for 5 days.  Coumadin was held and reversed. Patient was given platelets and DDAVP.  Head CT remained stable.  Physical therapy evaluated the patient and did not find any needs. Occupational therapy consulted on pt recommended no needs and d/c home with outpatient OT upon MD discretion post removal of LUE splint.     On day of discharge, the patient was tolerating diet, ambulating well and pain controlled. She will follow up with orthopedics and neurosurgery as outpatient.  Instructed pt to continue withholding her coumadin and aspirin until her outpatient follow-up with neurosurgery.

## 2019-06-04 NOTE — H&P ADULT - ASSESSMENT
86 year old woman w/ PMH sig for afib on coumadin who presents with a SAH and left radial fracture after a mechanical fall while gardening.    Plan:  - Admit to trauma team; Dr. Anastacio Osborne  - Multimodal pain control  - Neurosurgery recommendations appreciated  - F/u labs and imaging  - DVT PPx  - Diet: NPO  - Activity: advance as tolerated  - Full Support in Place  - Pt seen, examined, and discussed with Dr. Anastacio Sandhu, PGY-2  Trauma Surgery  p. [phone]

## 2019-06-04 NOTE — CONSULT NOTE ADULT - PROBLEM SELECTOR RECOMMENDATION 4
HbA1c 6.3% this morning, indicating good control - goal should be ~7% given age to decrease risk for hypoglycemic episodes  -Hold glipizide/pioglitazone as inpatient  -HISS coverage  -FS monitoring  -Diabetic diet  -Outpatient PMD follow up - will ask if glipizide can be held, as age makes high risk for hypoglycemia

## 2019-06-04 NOTE — DISCHARGE NOTE PROVIDER - CARE PROVIDERS DIRECT ADDRESSES
,[email or web address] [email or web address] the [email or web address]".[email or web address] ,[email or web address] ,[email or web address]

## 2019-06-04 NOTE — DISCHARGE NOTE PROVIDER - NSDCFUADDAPPT_GEN_ALL_CORE_FT
Please follow up with your PMD, Dr. Walker in 1 week. You will require further osteoporosis work up. Do not take any coumadin or aspirin unless cleared by neurosurgery.  Also your Hemoglobin A1C, which is a marker for diabetes management was in the low normal range.  Please discuss further regarding you glipizide at your follow up visit.  Please complete the 7 day course of Keppra as outpatient.

## 2019-06-04 NOTE — DISCHARGE NOTE PROVIDER - NSDCCPCAREPLAN_GEN_ALL_CORE_FT
PRINCIPAL DISCHARGE DIAGNOSIS  Diagnosis: Subarachnoid hemorrhage  Assessment and Plan of Treatment:       SECONDARY DISCHARGE DIAGNOSES  Diagnosis: Type 2 diabetes mellitus without complication, without long-term current use of insulin  Assessment and Plan of Treatment: Type 2 diabetes mellitus without complication, without long-term current use of insulin    Diagnosis: Radius fracture  Assessment and Plan of Treatment:     Diagnosis: Coronary artery disease involving native coronary artery of native heart without angina pectoris  Assessment and Plan of Treatment: Coronary artery disease involving native coronary artery of native heart without angina pectoris    Diagnosis: Radius fracture  Assessment and Plan of Treatment: Radius fracture PRINCIPAL DISCHARGE DIAGNOSIS  Diagnosis: Subarachnoid hemorrhage  Assessment and Plan of Treatment: Please follow up with neurosurgery in 2 weeks Dr. Sharma      SECONDARY DISCHARGE DIAGNOSES  Diagnosis: Type 2 diabetes mellitus without complication, without long-term current use of insulin  Assessment and Plan of Treatment: Type 2 diabetes mellitus without complication, without long-term current use of insulin    Diagnosis: Coronary artery disease involving native coronary artery of native heart without angina pectoris  Assessment and Plan of Treatment: Coronary artery disease involving native coronary artery of native heart without angina pectoris    Diagnosis: Radius fracture  Assessment and Plan of Treatment: Radius fracture    Diagnosis: Radius fracture  Assessment and Plan of Treatment: continue splint to upper extremeity and non weight bearing- follow up with orthopedics Dr. Del Toro in 2 weeks PRINCIPAL DISCHARGE DIAGNOSIS  Diagnosis: Subarachnoid hemorrhage  Assessment and Plan of Treatment: Please follow up with neurosurgery in 1 week with  Dr. Sharma      SECONDARY DISCHARGE DIAGNOSES  Diagnosis: Type 2 diabetes mellitus without complication, without long-term current use of insulin  Assessment and Plan of Treatment: Type 2 diabetes mellitus without complication, without long-term current use of insulin- Please follow up with your PMD, Dr. Walker,    Diagnosis: Coronary artery disease involving native coronary artery of native heart without angina pectoris  Assessment and Plan of Treatment: Coronary artery disease involving native coronary artery of native heart without angina pectoris    Diagnosis: Radius fracture  Assessment and Plan of Treatment: Radius fracture    Diagnosis: Radius fracture  Assessment and Plan of Treatment: continue splint to upper extremeity and non weight bearing- follow up with orthopedics Dr. Del Toro in 1-2 weeks

## 2019-06-04 NOTE — PHYSICAL THERAPY INITIAL EVALUATION ADULT - RANGE OF MOTION EXAMINATION, REHAB EVAL
bilateral lower extremity ROM was WFL (within functional limits)/L wrist splinted/bilateral upper extremity ROM was WFL (within functional limits)

## 2019-06-04 NOTE — DISCHARGE NOTE PROVIDER - NSDCFUADDINST_GEN_ALL_CORE_FT
WOUND CARE: Place clean dry gauze on your incision, and change daily.   Your bandage may have small noted areas of blood on your dressing this is normal. If your dressing continues to ooze and saturate please notify your surgeon.   Left Upper extremity arm should remain in sling and splinted. Left Upper extremity should remain non weight bearing. Ice upper extremity   20 minutes on/20 minutes off.   BATHING: Please do not submerge wound underwater. You may shower and/or sponge bathe.  ACTIVITY: Do not lift anything heavier than 10 lbs, which is about a gallon of milk. No Strenuous activity for about 6 weeks, and until cleared by your surgeon. Strenuous activity includes yoga, weight lifting, and cycling. Stairs and walking are ok. Otherwise, you may return to your usual level of physical activity. If you are taking narcotic pain medication (such as Percocet), do NOT drive a car, operate machinery or make important decisions.  DIET: Return to your usual diet.  NOTIFY YOUR SURGEON IF: You have any bleeding that does not stop, any pus draining from your wound, any fever (over 100.4 F) or chills, persistent nausea/vomiting, persistent diarrhea, or if your pain is not controlled on your discharge pain medications.  FOLLOW-UP:  1. Please call to make a follow-up appointment within one week of discharge with Dr. Smith.   2. Please follow up with your primary care physician in one week regarding your hospitalization.  3. Follow up with orthopedics within one week of discharge from the hospital with Dr. Del Toro. WOUND CARE: Place clean dry gauze on your incision, and change daily.   Your bandage may have small noted areas of blood on your dressing this is normal. If your dressing continues to ooze and saturate please notify your surgeon.   Left Upper extremity arm should remain in sling and splinted. Left Upper extremity should remain non weight bearing. Ice upper extremity   20 minutes on/20 minutes off.   BATHING: Please do not submerge wound underwater. You may shower and/or sponge bathe.  ACTIVITY: Do not lift anything heavier than 10 lbs, which is about a gallon of milk. No Strenuous activity for about 6 weeks, and until cleared by your surgeon. Strenuous activity includes yoga, weight lifting, and cycling. Stairs and walking are ok. Otherwise, you may return to your usual level of physical activity. If you are taking narcotic pain medication (such as Percocet), do NOT drive a car, operate machinery or make important decisions.  DIET: Return to your usual diet.  NOTIFY YOUR SURGEON IF: You have any bleeding that does not stop, any pus draining from your wound, any fever (over 100.4 F) or chills, persistent nausea/vomiting, persistent diarrhea, or if your pain is not controlled on your discharge pain medications.

## 2019-06-04 NOTE — CONSULT NOTE ADULT - PROBLEM SELECTOR RECOMMENDATION 8
Medication list confirmed with patient at bedside  Message left for PMD, Dr. Walker, who is out of office today - await callback to discuss hospitalization    Discharge planning tentatively for tomorrow

## 2019-06-04 NOTE — ED ADULT NURSE REASSESSMENT NOTE - NS ED NURSE REASSESS COMMENT FT1
Pt provided with turkey sandwich, saltine crackers and diet gingerale. Dysphagia screen has been completed, passed and documented in sunrise. Bed in lowest position. Side rails up for safety. Red socks in place. Awaiting admitting bed.

## 2019-06-04 NOTE — CONSULT NOTE ADULT - PROBLEM SELECTOR RECOMMENDATION 9
-Continue to hold systemic anticoagulation and aspirin for now  -Fall precautions, seizure precautions  -Keppra 500 mg PO BID x 7 days for seizure prophylaxis  -PT evaluation  -Serial neuro exams  -Neurosurgery/trauma notes appreciated  -Add vitamin D 1000 IU daily for fall prevention

## 2019-06-04 NOTE — PHYSICAL THERAPY INITIAL EVALUATION ADULT - GAIT DEVIATIONS NOTED, PT EVAL
Health Maintenance Summary     Topic Due On Due Status Completed On    Colorectal Cancer Screening - Colonoscopy Jun 19, 2019 Not Due Jun 19, 2009    Immunization - Pneumococcal  Completed Oct 8, 2015    Abdominal Aortic Aneurysm (AAA) Screening   Completed Aug 14, 2017    Medicare Wellness Visit Jul 18, 2018 Due Soon Jul 18, 2017    IMMUNIZATION - DTaP/Tdap/Td Nov 10, 2010 Overdue Nov 9, 2010    Immunization-Influenza  Completed Sep 1, 2017          Patient is up to date, no discussion needed .             normal gait pattern

## 2019-06-04 NOTE — CONSULT NOTE ADULT - ASSESSMENT
86F s/p mech fall on asa/plavix and coumadin found to have trace tsah  - no acute neurosurgical intervention  - reverse coumadin  - repeat inr  - plts and ddavp  - repeat cth at 4 hours, if stable okay for q4hr neurochecks  - CTH in AM for stability   - keppra 500 bid for 5 days
86 year old woman with a PMHx significant for atrial fibrillation on coumadin, CAD s/p MI in 2003 s/p PCI on ASA, T2DM, HTN, who presented overnight as a transfer from OSH for traumatic subarachnoid hemorrhage and left distal radius fracture. Repeat CT imaging shows stable SAH. No focal neuro deficits appreciated.

## 2019-06-04 NOTE — H&P ADULT - NSHPLABSRESULTS_GEN_ALL_CORE
LABS:                        13.6   15.0  )-----------( 239      ( 03 Jun 2019 21:13 )             40.6       06-03    132<L>  |  96  |  30<H>  ----------------------------<  158<H>  3.8   |  23  |  0.94    Ca    9.4      03 Jun 2019 21:12    TPro  7.0  /  Alb  4.4  /  TBili  0.4  /  DBili  x   /  AST  18  /  ALT  18  /  AlkPhos  65  06-03    Negative 06-03-19 @ 22:42 LABS:                        13.6   15.0  )-----------( 239      ( 03 Jun 2019 21:13 )             40.6       06-03    132<L>  |  96  |  30<H>  ----------------------------<  158<H>  3.8   |  23  |  0.94    Ca    9.4      03 Jun 2019 21:12    TPro  7.0  /  Alb  4.4  /  TBili  0.4  /  DBili  x   /  AST  18  /  ALT  18  /  AlkPhos  65  06-03    Negative 06-03-19 @ 22:42    IMAGING:    < from: CT Head No Cont (06.03.19 @ 21:16) >    FINDINGS:     Redemonstration of trace subarachnoid hemorrhage along the right   temporoparietal region, unchanged in comparison to the prior examination   of 6/23/2019 at 5:34 PM. No new areas of hemorrhage are identified. No   significant mass effect or shift of the midline. The basal cisterns are   not effaced. There is cerebral and cerebellar volume loss with prominence   of the ventricles and sulci. Mild chronic ischemic changes are seen in   the frontoparietal white matter. There are atherosclerotic calcifications   of the intracranial carotid and intradural vertebral arteries. There is a   calcified meningioma along the inner table of the right frontal bone   measuring up to 7 mm.    There is again noted left-sided lateral periorbital and premalar soft   tissue swelling. The skull base and bony calvarium are intact. The   visualized paranasal sinuses and tympanic/mastoid cavities are clear   apart from mild bilateral ethmoid mucosal thickening.    IMPRESSION:     Trace subarachnoid hemorrhage along the right frontotemporal region,   unchanged in comparison to the prior examination of the same day. No new   areas of hemorrhage identified.    < end of copied text >    FINDINGS:   No prior examinations are available for review.    The osseous structures of the wrist are remarkable for a nondisplaced   fracture of the distal radius likely extending into the radiocarpal joint   with impaction. There is question of a nondisplaced ulnar styloid   fracture as well. There are degenerative changes in the first metacarpal   carpal joint and first interphalangeal joint in the hands. There is   diffuse soft tissue swelling.    .   Intercarpal spacing and alignment are preserved.  The common carpal   metacarpal and first carpal metacarpal joints appear intact.       No radiopaque foreign body is seen.     IMPRESSION: Distal radial fracture with question of nondisplaced ulnar   styloid fracture.      < end of copied text >

## 2019-06-04 NOTE — DISCHARGE NOTE PROVIDER - CONDITIONS AT DISCHARGE
Seen and examined by Attending Physician Dr. Smith  who deemed, and cleared patient stable for discharge.

## 2019-06-04 NOTE — CHART NOTE - NSCHARTNOTEFT_GEN_A_CORE
FULL NOTE TO Follow    patient seen and examined at [facility] with daughter and partner  family was surprised to see orthopedic surgery resident as the first thing they (including the patient) stated is that the patient will not undergo any kind of surgery   patient was sent in by home oncologist for MRI and radiation therapy, and not for surgery

## 2019-06-04 NOTE — CONSULT NOTE ADULT - SUBJECTIVE AND OBJECTIVE BOX
86yFemale presents to [facility] ED c/o severe L wrist pain s/p mechanical fall while gardening. Patient also hit her head. Was sent to [facility] and found to have subarachnoid hemorrhage. Patient was transferred to [facility]. Patient denies LOC but did hit her head. Localizing pain to distal radius. Denies radiation of pain. Pt denies numbness, tingling or burning. R Hand Dominant. Patient denies any other injuries.  Patient admitted to trauma team.   PMH:  Cancer of ureter  MI (myocardial infarction)  DM (diabetes mellitus)  Afib  Hypertension    PSH:    AH:    Meds: See med rec    T(C): 36.4 (06-04-19 @ 01:51)  HR: 64 (06-04-19 @ 01:51)  BP: 156/62 (06-04-19 @ 01:51)  RR: 16 (06-04-19 @ 01:51)  SpO2: 98% (06-04-19 @ 01:51)  Wt(kg): --    PE L UE:  Skin intact, unable to appreciate deformity of wrist, + mild soft tissue swelling, mild ecchymosis; Decreased ROM of Wrist 2/2 pain. Normal Elbow/Shoulder ROM w/o pain. + TTP over DR/Ulna. + Rad Pulse 2+/4. SILT C5-T1, +AIN/PIN/Ulnar/Radial/Musc/Median, soft compartments;    R UE / B/L LE:  No bony TTP; Good ROM w/o pain. Able to SLR B/L. Exam Unremarkable.     Imaging:  XRay L Wrist  3 views of L Wrist demonstrates L distal radius fracture     Procedure Note:  Short arm cast applied to Forearm/Wrist and mold held. Will obtain XR. Pt NVI post procedure. Pt tolerated procedure well.    A/P: 86yFemale s/p Mech Fall w/ L Distal Radius Fracture  - admitted to trauma service   - Pain control  - Strict Ice/Elevation  - NWB L UE with splint and sling  - Keep splint clean/dry/intact;  - Encourage active finger motion to help with swelling  - Pt made aware of signs and symptoms of compartment syndrome. Aware of need to contact Doctor / Return to ED if symptoms arise.  - ortho stable  - no acute orthopedic intervention at this time   - All Pt's / Family Members questions answered, Pt/family understand plan.  - can follow up with Dr. Del Toro or orthopedist of her choice

## 2019-06-04 NOTE — PHYSICAL THERAPY INITIAL EVALUATION ADULT - PERTINENT HX OF CURRENT PROBLEM, REHAB EVAL
86 y/oF admitted 6/4 transfer from OSH for traumatic trace subarachnoid hemorrhage and left distal radius fracture. Patient states she was gardening today when she tripped over a rock, fell and hit head.  Initially taken to [facility] where she received K centra and Vitamin K. L wrist was splinted.

## 2019-06-04 NOTE — DISCHARGE NOTE PROVIDER - PROVIDER TOKENS
PROVIDER:[TOKEN:[[id number]],FOLLOWUP:[1 week]],PROVIDER:[TOKEN:[[id number]],FOLLOWUP:[1 week]] PROVIDER:[TOKEN:[[id number]],FOLLOWUP:[1 week]],PROVIDER:[TOKEN:[[id number]],FOLLOWUP:[1 week]],PROVIDER:[TOKEN:[[id number]]] PROVIDER:[TOKEN:[[id number]],FOLLOWUP:[1 week]],PROVIDER:[TOKEN:[[id number]],FOLLOWUP:[1 week]]

## 2019-06-04 NOTE — CONSULT NOTE ADULT - PROBLEM SELECTOR RECOMMENDATION 3
Currently in NSR, rate controlled  -Continue metoprolol 50 mg PO BID  -Hold coumadin - to be restarted as an outpatient once cleared by neurosurgery

## 2019-06-05 ENCOUNTER — TRANSCRIPTION ENCOUNTER (OUTPATIENT)
Age: 84
End: 2019-06-05

## 2019-06-05 VITALS
SYSTOLIC BLOOD PRESSURE: 157 MMHG | OXYGEN SATURATION: 99 % | TEMPERATURE: 98 F | HEART RATE: 60 BPM | DIASTOLIC BLOOD PRESSURE: 54 MMHG | RESPIRATION RATE: 18 BRPM

## 2019-06-05 LAB
ANION GAP SERPL CALC-SCNC: 13 MMOL/L — SIGNIFICANT CHANGE UP (ref 5–17)
APTT BLD: 29 SEC — SIGNIFICANT CHANGE UP (ref 27.5–36.3)
BUN SERPL-MCNC: 32 MG/DL — HIGH (ref 7–23)
CALCIUM SERPL-MCNC: 8.9 MG/DL — SIGNIFICANT CHANGE UP (ref 8.4–10.5)
CHLORIDE SERPL-SCNC: 96 MMOL/L — SIGNIFICANT CHANGE UP (ref 96–108)
CO2 SERPL-SCNC: 23 MMOL/L — SIGNIFICANT CHANGE UP (ref 22–31)
CREAT SERPL-MCNC: 0.8 MG/DL — SIGNIFICANT CHANGE UP (ref 0.5–1.3)
GLUCOSE BLDC GLUCOMTR-MCNC: 128 MG/DL — HIGH (ref 70–99)
GLUCOSE BLDC GLUCOMTR-MCNC: 164 MG/DL — HIGH (ref 70–99)
GLUCOSE SERPL-MCNC: 173 MG/DL — HIGH (ref 70–99)
HCT VFR BLD CALC: 34.5 % — SIGNIFICANT CHANGE UP (ref 34.5–45)
HGB BLD-MCNC: 11.1 G/DL — LOW (ref 11.5–15.5)
INR BLD: 1.07 RATIO — SIGNIFICANT CHANGE UP (ref 0.88–1.16)
MAGNESIUM SERPL-MCNC: 2.2 MG/DL — SIGNIFICANT CHANGE UP (ref 1.6–2.6)
MCHC RBC-ENTMCNC: 30.7 PG — SIGNIFICANT CHANGE UP (ref 27–34)
MCHC RBC-ENTMCNC: 32.2 GM/DL — SIGNIFICANT CHANGE UP (ref 32–36)
MCV RBC AUTO: 95.6 FL — SIGNIFICANT CHANGE UP (ref 80–100)
PHOSPHATE SERPL-MCNC: 2.7 MG/DL — SIGNIFICANT CHANGE UP (ref 2.5–4.5)
PLATELET # BLD AUTO: 290 K/UL — SIGNIFICANT CHANGE UP (ref 150–400)
POTASSIUM SERPL-MCNC: 3.7 MMOL/L — SIGNIFICANT CHANGE UP (ref 3.5–5.3)
POTASSIUM SERPL-SCNC: 3.7 MMOL/L — SIGNIFICANT CHANGE UP (ref 3.5–5.3)
PROTHROM AB SERPL-ACNC: 12.3 SEC — SIGNIFICANT CHANGE UP (ref 10–13.1)
RBC # BLD: 3.61 M/UL — LOW (ref 3.8–5.2)
RBC # FLD: 12.8 % — SIGNIFICANT CHANGE UP (ref 10.3–14.5)
SODIUM SERPL-SCNC: 132 MMOL/L — LOW (ref 135–145)
WBC # BLD: 9.31 K/UL — SIGNIFICANT CHANGE UP (ref 3.8–10.5)
WBC # FLD AUTO: 9.31 K/UL — SIGNIFICANT CHANGE UP (ref 3.8–10.5)

## 2019-06-05 PROCEDURE — 73090 X-RAY EXAM OF FOREARM: CPT

## 2019-06-05 PROCEDURE — 83036 HEMOGLOBIN GLYCOSYLATED A1C: CPT

## 2019-06-05 PROCEDURE — 93005 ELECTROCARDIOGRAM TRACING: CPT

## 2019-06-05 PROCEDURE — P9037: CPT

## 2019-06-05 PROCEDURE — 70486 CT MAXILLOFACIAL W/O DYE: CPT

## 2019-06-05 PROCEDURE — 96375 TX/PRO/DX INJ NEW DRUG ADDON: CPT

## 2019-06-05 PROCEDURE — 85610 PROTHROMBIN TIME: CPT

## 2019-06-05 PROCEDURE — 29125 APPL SHORT ARM SPLINT STATIC: CPT | Mod: LT

## 2019-06-05 PROCEDURE — 73110 X-RAY EXAM OF WRIST: CPT

## 2019-06-05 PROCEDURE — 86900 BLOOD TYPING SEROLOGIC ABO: CPT

## 2019-06-05 PROCEDURE — 70450 CT HEAD/BRAIN W/O DYE: CPT

## 2019-06-05 PROCEDURE — 80053 COMPREHEN METABOLIC PANEL: CPT

## 2019-06-05 PROCEDURE — 96365 THER/PROPH/DIAG IV INF INIT: CPT

## 2019-06-05 PROCEDURE — 97161 PT EVAL LOW COMPLEX 20 MIN: CPT

## 2019-06-05 PROCEDURE — 82962 GLUCOSE BLOOD TEST: CPT

## 2019-06-05 PROCEDURE — 83690 ASSAY OF LIPASE: CPT

## 2019-06-05 PROCEDURE — 83735 ASSAY OF MAGNESIUM: CPT

## 2019-06-05 PROCEDURE — 97165 OT EVAL LOW COMPLEX 30 MIN: CPT

## 2019-06-05 PROCEDURE — 36415 COLL VENOUS BLD VENIPUNCTURE: CPT

## 2019-06-05 PROCEDURE — 80048 BASIC METABOLIC PNL TOTAL CA: CPT

## 2019-06-05 PROCEDURE — 85027 COMPLETE CBC AUTOMATED: CPT

## 2019-06-05 PROCEDURE — 86901 BLOOD TYPING SEROLOGIC RH(D): CPT

## 2019-06-05 PROCEDURE — 36430 TRANSFUSION BLD/BLD COMPNT: CPT

## 2019-06-05 PROCEDURE — 80307 DRUG TEST PRSMV CHEM ANLYZR: CPT

## 2019-06-05 PROCEDURE — 83605 ASSAY OF LACTIC ACID: CPT

## 2019-06-05 PROCEDURE — 99285 EMERGENCY DEPT VISIT HI MDM: CPT | Mod: 25

## 2019-06-05 PROCEDURE — 85730 THROMBOPLASTIN TIME PARTIAL: CPT

## 2019-06-05 PROCEDURE — 73100 X-RAY EXAM OF WRIST: CPT

## 2019-06-05 PROCEDURE — 84100 ASSAY OF PHOSPHORUS: CPT

## 2019-06-05 PROCEDURE — 81001 URINALYSIS AUTO W/SCOPE: CPT

## 2019-06-05 PROCEDURE — 86850 RBC ANTIBODY SCREEN: CPT

## 2019-06-05 PROCEDURE — 99233 SBSQ HOSP IP/OBS HIGH 50: CPT

## 2019-06-05 RX ORDER — WARFARIN SODIUM 2.5 MG/1
1 TABLET ORAL
Qty: 0 | Refills: 0 | DISCHARGE

## 2019-06-05 RX ORDER — LEVETIRACETAM 250 MG/1
1 TABLET, FILM COATED ORAL
Qty: 8 | Refills: 0
Start: 2019-06-05

## 2019-06-05 RX ORDER — ACETAMINOPHEN 500 MG
2 TABLET ORAL
Qty: 0 | Refills: 0 | DISCHARGE
Start: 2019-06-05

## 2019-06-05 RX ORDER — SIMVASTATIN 20 MG/1
1 TABLET, FILM COATED ORAL
Qty: 0 | Refills: 0 | DISCHARGE

## 2019-06-05 RX ORDER — TRAMADOL HYDROCHLORIDE 50 MG/1
1 TABLET ORAL
Qty: 20 | Refills: 0
Start: 2019-06-05

## 2019-06-05 RX ORDER — ASPIRIN/CALCIUM CARB/MAGNESIUM 324 MG
1 TABLET ORAL
Qty: 0 | Refills: 0 | DISCHARGE

## 2019-06-05 RX ADMIN — LIDOCAINE 1 PATCH: 4 CREAM TOPICAL at 01:03

## 2019-06-05 RX ADMIN — Medication 650 MILLIGRAM(S): at 07:15

## 2019-06-05 RX ADMIN — LEVETIRACETAM 500 MILLIGRAM(S): 250 TABLET, FILM COATED ORAL at 06:41

## 2019-06-05 RX ADMIN — Medication 650 MILLIGRAM(S): at 12:22

## 2019-06-05 RX ADMIN — Medication 1: at 08:13

## 2019-06-05 RX ADMIN — AMLODIPINE BESYLATE 5 MILLIGRAM(S): 2.5 TABLET ORAL at 06:41

## 2019-06-05 RX ADMIN — Medication 650 MILLIGRAM(S): at 06:41

## 2019-06-05 RX ADMIN — LIDOCAINE 1 PATCH: 4 CREAM TOPICAL at 12:23

## 2019-06-05 RX ADMIN — Medication 15 MILLIGRAM(S): at 06:41

## 2019-06-05 RX ADMIN — Medication 650 MILLIGRAM(S): at 13:22

## 2019-06-05 RX ADMIN — Medication 50 MILLIGRAM(S): at 06:41

## 2019-06-05 RX ADMIN — LISINOPRIL 40 MILLIGRAM(S): 2.5 TABLET ORAL at 06:41

## 2019-06-05 NOTE — PROGRESS NOTE ADULT - ASSESSMENT
A: 86 year old woman w/ PMH sig for afib on coumadin who presents with a SAH and left radial fracture after a mechanical fall while gardening.    P:  - D/C today- no needs per PT  - f/u OT consult  - hold coumadin and ASA per NSx- will continue upon o/p f/u with NSx  - c/w keppra 500mg BID x7 d (started 6/3)  - o/p f/u with cardiology, osteoporosis screening    Acute Care Surgery Team  [id number]

## 2019-06-05 NOTE — OCCUPATIONAL THERAPY INITIAL EVALUATION ADULT - RANGE OF MOTION EXAMINATION, UPPER EXTREMITY
Right UE Active ROM was WNL (within normal limits)/LUE shoulder AROM WFL. Elbow/wrist not tested. Digit AROM limited by placement of splint./Right UE Passive ROM was WNL (within normal limits)

## 2019-06-05 NOTE — PROGRESS NOTE ADULT - PROBLEM SELECTOR PLAN 1
-Continue to hold systemic anticoagulation and aspirin for now  -Fall precautions, seizure precautions  -Keppra 500 mg PO BID for seizure prophylaxis  -PT evaluation  -Serial neuro exams  -Neurosurgery/trauma notes appreciated  -Vitamin D 1000 IU daily for fall prevention.

## 2019-06-05 NOTE — PROGRESS NOTE ADULT - PROBLEM SELECTOR PLAN 4
HbA1c 6.3% indicating good control - goal should be ~7% given age to decrease risk for hypoglycemic episodes  -Hold glipizide/pioglitazone as inpatient  -HISS coverage  -FS monitoring  -Diabetic diet  -Outpatient PMD follow up to reassess regimen

## 2019-06-05 NOTE — PROGRESS NOTE ADULT - SUBJECTIVE AND OBJECTIVE BOX
General Surgery Progress Note    SUBJECTIVE:  The patient was seen and examined. No acute events overnight. Pain well controlled. Denies HA, vision changes.     OBJECTIVE:     ** VITAL SIGNS / I&O's **    Vital Signs Last 24 Hrs  T(C): 36.4 (2019 09:00), Max: 36.9 (2019 16:10)  T(F): 97.5 (2019 09:00), Max: 98.5 (2019 05:15)  HR: 55 (2019 09:00) (52 - 66)  BP: 158/73 (2019 09:00) (117/52 - 164/49)  BP(mean): --  RR: 18 (2019 09:00) (17 - 18)  SpO2: 98% (2019 09:00) (96% - 99%)      2019 07:01  -  2019 07:00  --------------------------------------------------------  IN:    Oral Fluid: 1170 mL  Total IN: 1170 mL    OUT:  Total OUT: 0 mL    Total NET: 1170 mL          ** PHYSICAL EXAM **    -- CONSTITUTIONAL: Alert, NAD  -- PULMONARY: non-labored respirations  -- ABDOMEN: soft, non-distended, non-tender  -- EXT: LUE in cast, distal sensation and strength intact, FROM of all other extremities  -- NEURO: A&Ox3    ** LABS **                          11.7   10.3  )-----------( 284      ( 2019 11:54 )             33.9     2019 08:15    132    |  96     |  32     ----------------------------<  173    3.7     |  23     |  0.80     Ca    8.9        2019 08:15  Phos  2.7       2019 08:15  Mg     2.2       2019 08:15    TPro  7.0    /  Alb  4.4    /  TBili  0.4    /  DBili  x      /  AST  18     /  ALT  18     /  AlkPhos  65     2019 21:12    PT/INR - ( 2019 10:15 )   PT: 13.5 sec;   INR: 1.19 ratio         PTT - ( 2019 10:15 )  PTT:28.9 sec  CAPILLARY BLOOD GLUCOSE      POCT Blood Glucose.: 164 mg/dL (2019 07:51)  POCT Blood Glucose.: 208 mg/dL (2019 21:50)  POCT Blood Glucose.: 141 mg/dL (2019 19:03)  POCT Blood Glucose.: 195 mg/dL (2019 12:23)        LIVER FUNCTIONS - ( 2019 21:12 )  Alb: 4.4 g/dL / Pro: 7.0 g/dL / ALK PHOS: 65 U/L / ALT: 18 U/L / AST: 18 U/L / GGT: x             Urinalysis Basic - ( 2019 22:42 )    Color: Colorless / Appearance: Clear / S.015 / pH: x  Gluc: x / Ketone: Trace  / Bili: Negative / Urobili: Negative   Blood: x / Protein: 30 mg/dL / Nitrite: Negative   Leuk Esterase: Negative / RBC: 5 /hpf / WBC 4 /HPF   Sq Epi: x / Non Sq Epi: 0 /hpf / Bacteria: Negative        MEDICATIONS  (STANDING):  acetaminophen   Tablet .. 650 milliGRAM(s) Oral every 6 hours  amLODIPine   Tablet 5 milliGRAM(s) Oral daily  dextrose 5%. 1000 milliLiter(s) (50 mL/Hr) IV Continuous <Continuous>  dextrose 50% Injectable 12.5 Gram(s) IV Push once  dextrose 50% Injectable 25 Gram(s) IV Push once  dextrose 50% Injectable 25 Gram(s) IV Push once  insulin lispro (HumaLOG) corrective regimen sliding scale   SubCutaneous three times a day before meals  insulin lispro (HumaLOG) corrective regimen sliding scale   SubCutaneous at bedtime  levETIRAcetam 500 milliGRAM(s) Oral two times a day  lidocaine   Patch 1 Patch Transdermal daily  lisinopril 40 milliGRAM(s) Oral daily  metoprolol tartrate 50 milliGRAM(s) Oral two times a day  simvastatin 20 milliGRAM(s) Oral at bedtime  thyroid 15 milliGRAM(s) Oral daily    MEDICATIONS  (PRN):  dextrose 40% Gel 15 Gram(s) Oral once PRN Blood Glucose LESS THAN 70 milliGRAM(s)/deciliter  glucagon  Injectable 1 milliGRAM(s) IntraMuscular once PRN Glucose LESS THAN 70 milligrams/deciliter  traMADol 25 milliGRAM(s) Oral every 4 hours PRN Moderate Pain (4 - 6)  traMADol 50 milliGRAM(s) Oral every 4 hours PRN Severe Pain (7 - 10)

## 2019-06-05 NOTE — OCCUPATIONAL THERAPY INITIAL EVALUATION ADULT - DIAGNOSIS, OT EVAL
decreased strength and decreased AROM secondary to LUE radius fx impacting independence with ADLs and ability to perform functional mobility.

## 2019-06-05 NOTE — OCCUPATIONAL THERAPY INITIAL EVALUATION ADULT - ADDITIONAL COMMENTS
(CONT) CT Head (6/3) Trace subarachnoid hemorrhage along the right frontotemporal region, unchanged in comparison to the prior examination of the same day. No new areas of hemorrhage identified.  facial bone.  XR Forearm (6/4) Mildly impacted left distal radius fracture. There is no angulation of the distal fracture fragment. There is likely a nondisplaced fracture of the base of the ulnar styloid. There is associated soft tissue swelling. Arterial calcification is noted.

## 2019-06-05 NOTE — DISCHARGE NOTE NURSING/CASE MANAGEMENT/SOCIAL WORK - NSDCDPATPORTLINK_GEN_ALL_CORE
You can access the [facility] Patient Portal, offered by [facility], by registering with the following website: [email or web address]

## 2019-06-05 NOTE — PROGRESS NOTE ADULT - PROBLEM SELECTOR PLAN 6
OK to hold ASA at this time - PCI was remote, and risks outweight benefits at this time  -EKG non-ischemic in nature  -Continue statin  -Outpatient cardiology f/u.

## 2019-06-05 NOTE — OCCUPATIONAL THERAPY INITIAL EVALUATION ADULT - LIVES WITH, PROFILE
alone/Pt lives in private house with 0 DAMON and 5 steps up/down (split level). Pt was independent with all BADLs and IADLs PTA. Pt plans to d/c to her son's home for a few days upon d/c.

## 2019-06-05 NOTE — DISCHARGE NOTE NURSING/CASE MANAGEMENT/SOCIAL WORK - NSDCPEPTSTRK_GEN_ALL_CORE
Need for follow up after discharge/Prescribed medications/Stroke education booklet/Risk factors for stroke/Call 911 for stroke/Stroke support groups for patients, families, and friends/Stroke warning signs and symptoms/Signs and symptoms of stroke

## 2019-06-05 NOTE — OCCUPATIONAL THERAPY INITIAL EVALUATION ADULT - RANGE OF MOTION EXAMINATION, LOWER EXTREMITY
Left LE Active ROM was WNL  (within normal limits)/Right LE Passive ROM was WNL (within normal limits)/Right LE Active ROM was WNL(within normal limits)/Left LE Passive ROM was WNL (within normal limits)

## 2019-06-05 NOTE — PROGRESS NOTE ADULT - SUBJECTIVE AND OBJECTIVE BOX
[facility] Division of Hospital Medicine  Kenney Christianson MD  Pager (ARVIN, 4K-5I): [phone]  Other Times:  [phone]    Patient is a 86y old  Female who presents with a chief complaint of S/p Fall (2019 16:39)    SUBJECTIVE / OVERNIGHT EVENTS: No events overnight. Feels well. Denies headache, nausea, vomiting, chest pain, SOB, fever/chills. Pain is controlled. Eager to go home.    MEDICATIONS  (STANDING):  acetaminophen   Tablet .. 650 milliGRAM(s) Oral every 6 hours  amLODIPine   Tablet 5 milliGRAM(s) Oral daily  dextrose 5%. 1000 milliLiter(s) (50 mL/Hr) IV Continuous <Continuous>  dextrose 50% Injectable 12.5 Gram(s) IV Push once  dextrose 50% Injectable 25 Gram(s) IV Push once  dextrose 50% Injectable 25 Gram(s) IV Push once  insulin lispro (HumaLOG) corrective regimen sliding scale   SubCutaneous three times a day before meals  insulin lispro (HumaLOG) corrective regimen sliding scale   SubCutaneous at bedtime  levETIRAcetam 500 milliGRAM(s) Oral two times a day  lidocaine   Patch 1 Patch Transdermal daily  lisinopril 40 milliGRAM(s) Oral daily  metoprolol tartrate 50 milliGRAM(s) Oral two times a day  simvastatin 20 milliGRAM(s) Oral at bedtime  thyroid 15 milliGRAM(s) Oral daily    MEDICATIONS  (PRN):  dextrose 40% Gel 15 Gram(s) Oral once PRN Blood Glucose LESS THAN 70 milliGRAM(s)/deciliter  glucagon  Injectable 1 milliGRAM(s) IntraMuscular once PRN Glucose LESS THAN 70 milligrams/deciliter  traMADol 25 milliGRAM(s) Oral every 4 hours PRN Moderate Pain (4 - 6)  traMADol 50 milliGRAM(s) Oral every 4 hours PRN Severe Pain (7 - 10)      CAPILLARY BLOOD GLUCOSE      POCT Blood Glucose.: 128 mg/dL (2019 12:17)  POCT Blood Glucose.: 164 mg/dL (2019 07:51)  POCT Blood Glucose.: 208 mg/dL (2019 21:50)  POCT Blood Glucose.: 141 mg/dL (2019 19:03)    I&O's Summary    2019 07:01  -  2019 07:00  --------------------------------------------------------  IN: 1170 mL / OUT: 0 mL / NET: 1170 mL    2019 07:01  -  2019 12:57  --------------------------------------------------------  IN: 400 mL / OUT: 0 mL / NET: 400 mL        PHYSICAL EXAM:  Vital Signs Last 24 Hrs  T(C): 36.4 (2019 09:00), Max: 36.9 (2019 16:10)  T(F): 97.5 (2019 09:00), Max: 98.5 (2019 05:15)  HR: 57 (2019 10:43) (52 - 62)  BP: 152/87 (2019 10:43) (117/52 - 158/73)  BP(mean): --  RR: 18 (2019 10:43) (17 - 18)  SpO2: 97% (2019 10:43) (96% - 99%)  GENERAL: Elderly woman in NAD, well-groomed, well-developed  HEAD:  Bruising around left orbit and scalp without palpable hematoma.  EYES: EOMI, conjunctiva and sclera clear. + b/l arcus senilus.  RESPIRATORY: Clear to auscultation bilaterally; No rales, rhonchi, wheezing, or rubs  CARDIOVASCULAR: Regular rate and rhythm; No murmurs, rubs, or gallops  GASTROINTESTINAL: Soft, Nontender, Nondistended; Bowel sounds present  EXTREMITIES:  2+ Peripheral Pulses, No clubbing, cyanosis, or edema  NERVOUS SYSTEM:  Alert & Oriented X3; Moving all 4 extremities; No gross sensory deficits  MSK: Left wrist in splint. Full ROM noted at left shoulder  HEME/LYMPH: No lymphadenopathy noted  SKIN: Ecchymosis noted over left elbow    LABS:                        11.1   9.31  )-----------( 290      ( 2019 11:11 )             34.5     06-05    132<L>  |  96  |  32<H>  ----------------------------<  173<H>  3.7   |  23  |  0.80    Ca    8.9      2019 08:15  Phos  2.7     06-05  Mg     2.2     06-05    TPro  7.0  /  Alb  4.4  /  TBili  0.4  /  DBili  x   /  AST  18  /  ALT  18  /  AlkPhos  65  06-03    PT/INR - ( 2019 10:58 )   PT: 12.3 sec;   INR: 1.07 ratio         PTT - ( 2019 10:58 )  PTT:29.0 sec      Urinalysis Basic - ( 2019 22:42 )    Color: Colorless / Appearance: Clear / S.015 / pH: x  Gluc: x / Ketone: Trace  / Bili: Negative / Urobili: Negative   Blood: x / Protein: 30 mg/dL / Nitrite: Negative   Leuk Esterase: Negative / RBC: 5 /hpf / WBC 4 /HPF   Sq Epi: x / Non Sq Epi: 0 /hpf / Bacteria: Negative    RADIOLOGY & ADDITIONAL TESTS:    Imaging Personally Reviewed: No new studies    Consultant(s) Notes Reviewed:      Care Discussed with Consultants/Other Providers: Surgery - Dr. Jones - regarding discharge medication list and follow-up

## 2019-06-05 NOTE — PROGRESS NOTE ADULT - PROBLEM SELECTOR PLAN 2
-Orthopedics f/u appreciated  -Left wrist in splint  -Outpatient f/u  -Pain control with tylenol ATC, tramadol PRN - avoid narcotics as can increase risk for falls  -Will need outpatient osteoporosis screening and management - high suspicion given hx of hip fracture as well.

## 2019-06-05 NOTE — PROGRESS NOTE ADULT - PROBLEM SELECTOR PLAN 3
Currently in NSR, rate controlled  -Continue metoprolol 50 mg PO BID  -Hold coumadin - to be restarted as an outpatient once cleared by neurosurgery.

## 2019-06-05 NOTE — PROGRESS NOTE ADULT - ASSESSMENT
86 year old woman with a PMHx significant for atrial fibrillation on coumadin, CAD s/p MI in 2003 s/p PCI on ASA, T2DM, HTN, who presented overnight as a transfer from OSH for traumatic subarachnoid hemorrhage and left distal radius fracture. Repeat CT imaging shows stable SAH. No focal neuro deficits appreciated.

## 2019-06-05 NOTE — OCCUPATIONAL THERAPY INITIAL EVALUATION ADULT - PERTINENT HX OF CURRENT PROBLEM, REHAB EVAL
86F presenting for traumatic subarachnoid hemorrhage and left distal radius fracture. Patient states she was gardening when she tripped over a rock, fell and hit head. Denies loss of consciousness. Initially taken to [facility] where she received K centra and Vitamin K. L wrist was splinted. No other injuries. (CONT below)

## 2019-06-11 ENCOUNTER — OTHER (OUTPATIENT)
Age: 84
End: 2019-06-11

## 2019-06-17 ENCOUNTER — APPOINTMENT (OUTPATIENT)
Dept: NEUROSURGERY | Facility: CLINIC | Age: 84
End: 2019-06-17
Payer: MEDICARE

## 2019-06-17 ENCOUNTER — APPOINTMENT (OUTPATIENT)
Dept: ORTHOPEDIC SURGERY | Facility: CLINIC | Age: 84
End: 2019-06-17
Payer: MEDICARE

## 2019-06-17 VITALS
HEART RATE: 92 BPM | DIASTOLIC BLOOD PRESSURE: 80 MMHG | OXYGEN SATURATION: 98 % | SYSTOLIC BLOOD PRESSURE: 144 MMHG | RESPIRATION RATE: 18 BRPM

## 2019-06-17 VITALS
HEIGHT: 62 IN | SYSTOLIC BLOOD PRESSURE: 142 MMHG | BODY MASS INDEX: 23 KG/M2 | HEART RATE: 70 BPM | DIASTOLIC BLOOD PRESSURE: 60 MMHG | WEIGHT: 125 LBS

## 2019-06-17 DIAGNOSIS — Z82.61 FAMILY HISTORY OF ARTHRITIS: ICD-10-CM

## 2019-06-17 DIAGNOSIS — S52.532A COLLES' FRACTURE OF LEFT RADIUS, INITIAL ENCOUNTER FOR CLOSED FRACTURE: ICD-10-CM

## 2019-06-17 DIAGNOSIS — Z60.2 PROBLEMS RELATED TO LIVING ALONE: ICD-10-CM

## 2019-06-17 DIAGNOSIS — Z86.39 PERSONAL HISTORY OF OTHER ENDOCRINE, NUTRITIONAL AND METABOLIC DISEASE: ICD-10-CM

## 2019-06-17 DIAGNOSIS — S06.6X9A TRAUMATIC SUBARACHNOID HEMORRHAGE WITH LOSS OF CONSCIOUSNESS OF UNSPECIFIED DURATION, INITIAL ENCOUNTER: ICD-10-CM

## 2019-06-17 DIAGNOSIS — Z86.79 PERSONAL HISTORY OF OTHER DISEASES OF THE CIRCULATORY SYSTEM: ICD-10-CM

## 2019-06-17 DIAGNOSIS — Z78.9 OTHER SPECIFIED HEALTH STATUS: ICD-10-CM

## 2019-06-17 DIAGNOSIS — Z80.9 FAMILY HISTORY OF MALIGNANT NEOPLASM, UNSPECIFIED: ICD-10-CM

## 2019-06-17 DIAGNOSIS — Z87.39 PERSONAL HISTORY OF OTHER DISEASES OF THE MUSCULOSKELETAL SYSTEM AND CONNECTIVE TISSUE: ICD-10-CM

## 2019-06-17 DIAGNOSIS — Z85.42 PERSONAL HISTORY OF MALIGNANT NEOPLASM OF OTHER PARTS OF UTERUS: ICD-10-CM

## 2019-06-17 PROCEDURE — 99203 OFFICE O/P NEW LOW 30 MIN: CPT

## 2019-06-17 PROCEDURE — 99213 OFFICE O/P EST LOW 20 MIN: CPT

## 2019-06-17 PROCEDURE — 73110 X-RAY EXAM OF WRIST: CPT | Mod: LT

## 2019-06-17 RX ORDER — METOPROLOL TARTRATE 75 MG/1
TABLET, FILM COATED ORAL
Refills: 0 | Status: ACTIVE | COMMUNITY

## 2019-06-17 RX ORDER — PIOGLITAZONE HYDROCHLORIDE 45 MG/1
TABLET ORAL
Refills: 0 | Status: ACTIVE | COMMUNITY

## 2019-06-17 RX ORDER — RAMIPRIL 5 MG/1
CAPSULE ORAL
Refills: 0 | Status: ACTIVE | COMMUNITY

## 2019-06-17 RX ORDER — CHROMIUM 200 MCG
TABLET ORAL
Refills: 0 | Status: ACTIVE | COMMUNITY

## 2019-06-17 SDOH — SOCIAL STABILITY - SOCIAL INSECURITY: PROBLEMS RELATED TO LIVING ALONE: Z60.2

## 2019-06-19 NOTE — ASSESSMENT
[FreeTextEntry1] : This is a 87 yo female with recent traumatic SAH ~ 2 weeks ago. She is asymptomatic and neurologically intact. I have recommended\par - Repeat CTH wo ASAP\par - okay to restart aspirin (will contact PCP/Dr. Walker [phone])\par - continue to hold off coumadin until repeat image to reevaluate\par - RTC PRN is image shows resolution

## 2019-06-19 NOTE — PHYSICAL EXAM
[General Appearance - Alert] : alert [General Appearance - In No Acute Distress] : in no acute distress [General Appearance - Well Nourished] : well nourished [Oriented To Time, Place, And Person] : oriented to person, place, and time [Impaired Insight] : insight and judgment were intact [Memory Recent] : recent memory was not impaired [Affect] : the affect was normal [Person] : oriented to person [Place] : oriented to place [Short Term Intact] : short term memory intact [Time] : oriented to time [Concentration Intact] : normal concentrating ability [Span Intact] : the attention span was normal [Remote Intact] : remote memory intact [Fluency] : fluency intact [Comprehension] : comprehension intact [Current Events] : adequate knowledge of current events [Past History] : adequate knowledge of personal past history [Vocabulary] : adequate range of vocabulary [I: Normal Smell] : smell intact bilaterally [Cranial Nerves Optic (II)] : visual acuity intact bilaterally,  pupils equal round and reactive to light [Cranial Nerves Oculomotor (III)] : extraocular motion intact [Cranial Nerves Trigeminal (V)] : facial sensation intact symmetrically [Cranial Nerves Facial (VII)] : face symmetrical [Cranial Nerves Vestibulocochlear (VIII)] : hearing was intact bilaterally [Cranial Nerves Accessory (XI - Cranial And Spinal)] : head turning and shoulder shrug symmetric [Cranial Nerves Glossopharyngeal (IX)] : tongue and palate midline [Cranial Nerves Hypoglossal (XII)] : there was no tongue deviation with protrusion [Motor Strength] : muscle strength was normal in all four extremities [5] : S1 toe walking 5/5 [Sensation Tactile Decrease] : light touch was intact [Balance] : balance was intact [1+] : Ankle jerk left 1+ [No Visual Abnormalities] : no visible abnormailities [Normal] : normal [Sclera] : the sclera and conjunctiva were normal [PERRL With Normal Accommodation] : pupils were equal in size, round, reactive to light, with normal accommodation [Extraocular Movements] : extraocular movements were intact [Examination Of The Oral Cavity] : the lips and gums were normal [Outer Ear] : the ears and nose were normal in appearance [Neck Appearance] : the appearance of the neck was normal [Exaggerated Use Of Accessory Muscles For Inspiration] : no accessory muscle use [] : no respiratory distress [Abnormal Walk] : normal gait [Edema] : there was no peripheral edema [Motor Tone] : muscle strength and tone were normal [Romberg's Sign] : Romberg's sign was negtive [FreeTextEntry1] : hard of hearing

## 2019-06-19 NOTE — HISTORY OF PRESENT ILLNESS
[FreeTextEntry1] : Ms. ALYSSA MEDINA is an 87 yo female with PMH of a.fib (on coumadin), T2DM, HTN, MI (no ASA) who presented to [place] ED on 6/3/2019 for mechanical fall with head trauma on the same day. Initial CTH revealed a small acute subarachnoid hemorrhage in the right parietal posterior temporal region without mass effect/midline shift and incidental small meningioma on right frontal region. X-ray wrist was positive left distal radius fracture. She was transferred to [facility] on 6/4/19 and admitted to the hospital for observation. Repeat CTH was stable and she was neurologically intact. She was discharged to home on 6/5/2019 with follow up plan. Today she presents for follow up and states she is doing well at home. She has moderate pain on the right side arm related to her fracture but denies headache, dizziness, n/v, weakness, confusion, seizure activity, visual changes or any other focal neuro deficits.

## 2019-06-24 ENCOUNTER — APPOINTMENT (OUTPATIENT)
Dept: CT IMAGING | Facility: CLINIC | Age: 84
End: 2019-06-24
Payer: MEDICARE

## 2019-06-24 ENCOUNTER — OUTPATIENT (OUTPATIENT)
Dept: OUTPATIENT SERVICES | Facility: HOSPITAL | Age: 84
LOS: 1 days | End: 2019-06-24
Payer: MEDICARE

## 2019-06-24 DIAGNOSIS — S06.6X9A TRAUMATIC SUBARACHNOID HEMORRHAGE WITH LOSS OF CONSCIOUSNESS OF UNSPECIFIED DURATION, INITIAL ENCOUNTER: ICD-10-CM

## 2019-06-24 PROCEDURE — 70450 CT HEAD/BRAIN W/O DYE: CPT

## 2019-06-24 PROCEDURE — 70450 CT HEAD/BRAIN W/O DYE: CPT | Mod: 26

## 2019-07-15 ENCOUNTER — APPOINTMENT (OUTPATIENT)
Dept: ORTHOPEDIC SURGERY | Facility: CLINIC | Age: 84
End: 2019-07-15
Payer: MEDICARE

## 2019-07-15 VITALS
DIASTOLIC BLOOD PRESSURE: 63 MMHG | SYSTOLIC BLOOD PRESSURE: 166 MMHG | HEART RATE: 80 BPM | BODY MASS INDEX: 23 KG/M2 | WEIGHT: 125 LBS | HEIGHT: 62 IN

## 2019-07-15 DIAGNOSIS — S52.532D COLLES' FRACTURE OF LEFT RADIUS, SUBSEQUENT ENCOUNTER FOR CLOSED FRACTURE WITH ROUTINE HEALING: ICD-10-CM

## 2019-07-15 PROCEDURE — 99213 OFFICE O/P EST LOW 20 MIN: CPT

## 2019-07-15 PROCEDURE — 73110 X-RAY EXAM OF WRIST: CPT | Mod: LT

## 2019-07-23 ENCOUNTER — INBOUND DOCUMENT (OUTPATIENT)
Age: 84
End: 2019-07-23

## 2019-10-02 PROBLEM — Z60.2 PERSON LIVING ALONE: Status: ACTIVE | Noted: 2019-06-17

## 2019-10-03 NOTE — ED ADULT NURSE NOTE - TEMPLATE LIST FOR HEAD TO TOE ASSESSMENT
Insulin



Patient refusing to eat. He refused breakfast and is refusing lunch. Does not want anything 
to eat or drink even with prompting. Held Insulin. Accu Check was 154. Fall

## 2019-12-17 ENCOUNTER — OTHER (OUTPATIENT)
Age: 84
End: 2019-12-17

## 2020-08-06 ENCOUNTER — EMERGENCY (EMERGENCY)
Facility: HOSPITAL | Age: 85
LOS: 1 days | Discharge: HOME | End: 2020-08-06
Attending: EMERGENCY MEDICINE | Admitting: EMERGENCY MEDICINE
Payer: MEDICARE

## 2020-08-06 VITALS
HEART RATE: 101 BPM | SYSTOLIC BLOOD PRESSURE: 139 MMHG | OXYGEN SATURATION: 97 % | DIASTOLIC BLOOD PRESSURE: 78 MMHG | RESPIRATION RATE: 16 BRPM

## 2020-08-06 VITALS
HEIGHT: 62 IN | HEART RATE: 105 BPM | SYSTOLIC BLOOD PRESSURE: 185 MMHG | TEMPERATURE: 98 F | OXYGEN SATURATION: 96 % | DIASTOLIC BLOOD PRESSURE: 92 MMHG | RESPIRATION RATE: 16 BRPM | WEIGHT: 119.93 LBS

## 2020-08-06 LAB
ALBUMIN SERPL ELPH-MCNC: 3.7 G/DL — SIGNIFICANT CHANGE UP (ref 3.3–5)
ALP SERPL-CCNC: 66 U/L — SIGNIFICANT CHANGE UP (ref 30–120)
ALT FLD-CCNC: 24 U/L DA — SIGNIFICANT CHANGE UP (ref 10–60)
ANION GAP SERPL CALC-SCNC: 8 MMOL/L — SIGNIFICANT CHANGE UP (ref 5–17)
APPEARANCE UR: CLEAR — SIGNIFICANT CHANGE UP
APTT BLD: 33 SEC — SIGNIFICANT CHANGE UP (ref 27.5–35.5)
AST SERPL-CCNC: 19 U/L — SIGNIFICANT CHANGE UP (ref 10–40)
BACTERIA # UR AUTO: ABNORMAL
BASOPHILS # BLD AUTO: 0.12 K/UL — SIGNIFICANT CHANGE UP (ref 0–0.2)
BASOPHILS NFR BLD AUTO: 1.2 % — SIGNIFICANT CHANGE UP (ref 0–2)
BILIRUB SERPL-MCNC: 0.7 MG/DL — SIGNIFICANT CHANGE UP (ref 0.2–1.2)
BILIRUB UR-MCNC: NEGATIVE — SIGNIFICANT CHANGE UP
BUN SERPL-MCNC: 29 MG/DL — HIGH (ref 7–23)
CALCIUM SERPL-MCNC: 9.1 MG/DL — SIGNIFICANT CHANGE UP (ref 8.4–10.5)
CHLORIDE SERPL-SCNC: 104 MMOL/L — SIGNIFICANT CHANGE UP (ref 96–108)
CO2 SERPL-SCNC: 27 MMOL/L — SIGNIFICANT CHANGE UP (ref 22–31)
COLOR SPEC: YELLOW — SIGNIFICANT CHANGE UP
CREAT SERPL-MCNC: 1.08 MG/DL — SIGNIFICANT CHANGE UP (ref 0.5–1.3)
DIFF PNL FLD: ABNORMAL
EOSINOPHIL # BLD AUTO: 0.21 K/UL — SIGNIFICANT CHANGE UP (ref 0–0.5)
EOSINOPHIL NFR BLD AUTO: 2.1 % — SIGNIFICANT CHANGE UP (ref 0–6)
EPI CELLS # UR: SIGNIFICANT CHANGE UP
GLUCOSE BLDC GLUCOMTR-MCNC: 217 MG/DL — HIGH (ref 70–99)
GLUCOSE SERPL-MCNC: 241 MG/DL — HIGH (ref 70–99)
GLUCOSE UR QL: 100 MG/DL
HCT VFR BLD CALC: 43.9 % — SIGNIFICANT CHANGE UP (ref 34.5–45)
HGB BLD-MCNC: 14.4 G/DL — SIGNIFICANT CHANGE UP (ref 11.5–15.5)
IMM GRANULOCYTES NFR BLD AUTO: 0.6 % — SIGNIFICANT CHANGE UP (ref 0–1.5)
INR BLD: 1.07 RATIO — SIGNIFICANT CHANGE UP (ref 0.88–1.16)
KETONES UR-MCNC: NEGATIVE — SIGNIFICANT CHANGE UP
LEUKOCYTE ESTERASE UR-ACNC: ABNORMAL
LYMPHOCYTES # BLD AUTO: 1.77 K/UL — SIGNIFICANT CHANGE UP (ref 1–3.3)
LYMPHOCYTES # BLD AUTO: 18 % — SIGNIFICANT CHANGE UP (ref 13–44)
MCHC RBC-ENTMCNC: 31.1 PG — SIGNIFICANT CHANGE UP (ref 27–34)
MCHC RBC-ENTMCNC: 32.8 GM/DL — SIGNIFICANT CHANGE UP (ref 32–36)
MCV RBC AUTO: 94.8 FL — SIGNIFICANT CHANGE UP (ref 80–100)
MONOCYTES # BLD AUTO: 0.57 K/UL — SIGNIFICANT CHANGE UP (ref 0–0.9)
MONOCYTES NFR BLD AUTO: 5.8 % — SIGNIFICANT CHANGE UP (ref 2–14)
NEUTROPHILS # BLD AUTO: 7.08 K/UL — SIGNIFICANT CHANGE UP (ref 1.8–7.4)
NEUTROPHILS NFR BLD AUTO: 72.3 % — SIGNIFICANT CHANGE UP (ref 43–77)
NITRITE UR-MCNC: NEGATIVE — SIGNIFICANT CHANGE UP
NRBC # BLD: 0 /100 WBCS — SIGNIFICANT CHANGE UP (ref 0–0)
NT-PROBNP SERPL-SCNC: 362 PG/ML — SIGNIFICANT CHANGE UP (ref 0–450)
PH UR: 8 — SIGNIFICANT CHANGE UP (ref 5–8)
PLATELET # BLD AUTO: 257 K/UL — SIGNIFICANT CHANGE UP (ref 150–400)
POTASSIUM SERPL-MCNC: 3.9 MMOL/L — SIGNIFICANT CHANGE UP (ref 3.5–5.3)
POTASSIUM SERPL-SCNC: 3.9 MMOL/L — SIGNIFICANT CHANGE UP (ref 3.5–5.3)
PROT SERPL-MCNC: 7.4 G/DL — SIGNIFICANT CHANGE UP (ref 6–8.3)
PROT UR-MCNC: 100 MG/DL
PROTHROM AB SERPL-ACNC: 12.9 SEC — SIGNIFICANT CHANGE UP (ref 10.6–13.6)
RBC # BLD: 4.63 M/UL — SIGNIFICANT CHANGE UP (ref 3.8–5.2)
RBC # FLD: 12.8 % — SIGNIFICANT CHANGE UP (ref 10.3–14.5)
RBC CASTS # UR COMP ASSIST: ABNORMAL /HPF (ref 0–4)
SODIUM SERPL-SCNC: 139 MMOL/L — SIGNIFICANT CHANGE UP (ref 135–145)
SP GR SPEC: 1.01 — SIGNIFICANT CHANGE UP (ref 1.01–1.02)
TROPONIN I SERPL-MCNC: 0 NG/ML — LOW (ref 0.02–0.06)
UROBILINOGEN FLD QL: NEGATIVE MG/DL — SIGNIFICANT CHANGE UP
WBC # BLD: 9.81 K/UL — SIGNIFICANT CHANGE UP (ref 3.8–10.5)
WBC # FLD AUTO: 9.81 K/UL — SIGNIFICANT CHANGE UP (ref 3.8–10.5)
WBC UR QL: SIGNIFICANT CHANGE UP

## 2020-08-06 PROCEDURE — 82962 GLUCOSE BLOOD TEST: CPT

## 2020-08-06 PROCEDURE — 85610 PROTHROMBIN TIME: CPT

## 2020-08-06 PROCEDURE — 81001 URINALYSIS AUTO W/SCOPE: CPT

## 2020-08-06 PROCEDURE — 36415 COLL VENOUS BLD VENIPUNCTURE: CPT

## 2020-08-06 PROCEDURE — 83880 ASSAY OF NATRIURETIC PEPTIDE: CPT

## 2020-08-06 PROCEDURE — 99284 EMERGENCY DEPT VISIT MOD MDM: CPT | Mod: 25

## 2020-08-06 PROCEDURE — 85730 THROMBOPLASTIN TIME PARTIAL: CPT

## 2020-08-06 PROCEDURE — 93010 ELECTROCARDIOGRAM REPORT: CPT

## 2020-08-06 PROCEDURE — 80053 COMPREHEN METABOLIC PANEL: CPT

## 2020-08-06 PROCEDURE — 84484 ASSAY OF TROPONIN QUANT: CPT

## 2020-08-06 PROCEDURE — 99284 EMERGENCY DEPT VISIT MOD MDM: CPT

## 2020-08-06 PROCEDURE — 71045 X-RAY EXAM CHEST 1 VIEW: CPT

## 2020-08-06 PROCEDURE — 85027 COMPLETE CBC AUTOMATED: CPT

## 2020-08-06 PROCEDURE — 71045 X-RAY EXAM CHEST 1 VIEW: CPT | Mod: 26

## 2020-08-06 PROCEDURE — 93005 ELECTROCARDIOGRAM TRACING: CPT

## 2020-08-06 RX ORDER — LISINOPRIL 2.5 MG/1
40 TABLET ORAL ONCE
Refills: 0 | Status: COMPLETED | OUTPATIENT
Start: 2020-08-06 | End: 2020-08-06

## 2020-08-06 RX ORDER — METOPROLOL TARTRATE 50 MG
25 TABLET ORAL ONCE
Refills: 0 | Status: COMPLETED | OUTPATIENT
Start: 2020-08-06 | End: 2020-08-06

## 2020-08-06 RX ORDER — AMLODIPINE BESYLATE 2.5 MG/1
5 TABLET ORAL ONCE
Refills: 0 | Status: COMPLETED | OUTPATIENT
Start: 2020-08-06 | End: 2020-08-06

## 2020-08-06 RX ADMIN — Medication 25 MILLIGRAM(S): at 09:13

## 2020-08-06 RX ADMIN — LISINOPRIL 40 MILLIGRAM(S): 2.5 TABLET ORAL at 09:13

## 2020-08-06 RX ADMIN — AMLODIPINE BESYLATE 5 MILLIGRAM(S): 2.5 TABLET ORAL at 09:13

## 2020-08-06 NOTE — ED PROVIDER NOTE - NSFOLLOWUPINSTRUCTIONS_ED_ALL_ED_FT
Atrial Fibrillation     Atrial fibrillation is a type of heartbeat that is irregular or fast (rapid). If you have this condition, your heart beats without any order. This makes it hard for your heart to pump blood in a normal way. Having this condition gives you more risk for stroke, heart failure, and other heart problems.  Atrial fibrillation may start all of a sudden and then stop on its own, or it may become a long-lasting problem.  What are the causes?  This condition may be caused by heart conditions, such as:  High blood pressure.Heart failure.Heart valve disease.Heart surgery.Other causes include:  Pneumonia.Obstructive sleep apnea.Lung cancer.Thyroid disease.Drinking too much alcohol.Sometimes the cause is not known.  What increases the risk?  You are more likely to develop this condition if:  You smoke.You are older.You have diabetes.You are overweight.You have a family history of this condition.You exercise often and hard.What are the signs or symptoms?  Common symptoms of this condition include:  A feeling like your heart is beating very fast.Chest pain.Feeling short of breath.Feeling light-headed or weak.Getting tired easily.Follow these instructions at home:  Medicines     Take over-the-counter and prescription medicines only as told by your doctor.If your doctor gives you a blood-thinning medicine, take it exactly as told. Taking too much of it can cause bleeding. Taking too little of it does not protect you against clots. Clots can cause a stroke.Lifestyle         Do not use any tobacco products. These include cigarettes, chewing tobacco, and e-cigarettes. If you need help quitting, ask your doctor.Do not drink alcohol.Do not drink beverages that have caffeine. These include coffee, soda, and tea.Follow diet instructions as told by your doctor.Exercise regularly as told by your doctor.General instructions     If you have a condition that causes breathing to stop for a short period of time (apnea), treat it as told by your doctor.Keep a healthy weight. Do not use diet pills unless your doctor says they are safe for you. Diet pills may make heart problems worse.Keep all follow-up visits as told by your doctor. This is important.Contact a doctor if:  You notice a change in the speed, rhythm, or strength of your heartbeat.You are taking a blood-thinning medicine and you see more bruising.You get tired more easily when you move or exercise.You have a sudden change in weight.Get help right away if:     You have pain in your chest or your belly (abdomen).You have trouble breathing.You have blood in your vomit, poop, or pee (urine).You have any signs of a stroke. "BE FAST" is an easy way to remember the main warning signs:  B - Balance. Signs are dizziness, sudden trouble walking, or loss of balance.E - Eyes. Signs are trouble seeing or a change in how you see.F - Face. Signs are sudden weakness or loss of feeling in the face, or the face or eyelid drooping on one side.A - Arms. Signs are weakness or loss of feeling in an arm. This happens suddenly and usually on one side of the body.S - Speech. Signs are sudden trouble speaking, slurred speech, or trouble understanding what people say.T - Time. Time to call emergency services. Write down what time symptoms started.You have other signs of a stroke, such as:  A sudden, very bad headache with no known cause. Feeling sick to your stomach (nausea).Throwing up (vomiting).Jerky movements you cannot control (seizure).These symptoms may be an emergency. Do not wait to see if the symptoms will go away. Get medical help right away. Call your local emergency services (911 in the [place] Do not drive yourself to the hospital.   Summary  Atrial fibrillation is a type of heartbeat that is irregular or fast (rapid).You are at higher risk of this condition if you smoke, are older, have diabetes, or are overweight.Follow your doctor's instructions about medicines, diet, exercise, and follow-up visits.Get help right away if you think that you have signs of a stroke.This information is not intended to replace advice given to you by your health care provider. Make sure you discuss any questions you have with your health care provider.

## 2020-08-06 NOTE — ED PROVIDER NOTE - CHPI ED SYMPTOMS NEG
no nausea/no shortness of breath/no back pain/no chest pain/no cough/no dizziness/no diaphoresis/no chills/no syncope/no vomiting/no fever

## 2020-08-06 NOTE — ED PROVIDER NOTE - PATIENT PORTAL LINK FT
You can access the FollowMyHealth Patient Portal offered by [facility] by registering at the following website: [email or web address]. By joining [facility]’s FollowMyHealth portal, you will also be able to view your health information using other applications (apps) compatible with our system.
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
## 2020-08-06 NOTE — ED ADULT NURSE NOTE - OBJECTIVE STATEMENT
Pt BIBA from home A/Ox4 for elevated BP and HR. Pt reported woke up this morning and took her BP with her home  BP apparatus . Pt stated that her BP and HR was high. Pt took her metoprolol x 1 tab and called 911. Pt denies any CP No SOB No fever No chills.

## 2020-08-06 NOTE — CONSULT NOTE ADULT - ASSESSMENT
The patient is an 87 year old female with a history of HTN, HL, DM, hypothyroidism, chronic atrial fibrillation who presents with elevated BP and tachycardia.    Plan:  - Patient in ED received the remainder of her antihypertensives and an additional dose of metoprolol  - She remains asymptomatic  - BP with improvement to the 160s and HR now in the 90-100s  - Labs unremarkable  - Cardiac enzymes negative  - BNP normal  - CXR with hyperinflated lungs but no infiltrates  - Continue current cardiac regimen; not on anticoagulation for AF due to prior falls as per patient  - Patient can be discharged from a cardiac perspective. Advised patient that if elevated BP and HR occur again, then she again should take her metoprolol dose or an additional dose if needed, and to wait up to one hour if she is otherwise asymptomatic.

## 2020-08-06 NOTE — ED PROVIDER NOTE - CARE PLAN
Principal Discharge DX:	Afib  Secondary Diagnosis:	Hypertension  Secondary Diagnosis:	DM (diabetes mellitus)
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
## 2020-08-06 NOTE — ED PROVIDER NOTE - CARE PROVIDER_API CALL
Rainer Lackey)  Cardiovascular Disease; Internal Medicine  [address] [place] [address]  Phone: [phone]  Fax: [phone]  Established Patient  Follow Up Time: Urgent

## 2020-08-06 NOTE — ED ADULT TRIAGE NOTE - CHIEF COMPLAINT QUOTE
"I took my BP & HR this morning & my HR was high-140 & BP alittle high so I called the amb" Denies SOB or CP

## 2020-08-06 NOTE — ED ADULT NURSE NOTE - TEMPLATE
September 19, 2019      Ann Cee MD  [address]  [facility] [address]           [facility]  [address]  [facility] [address]  Phone: [phone]          Patient: Rhys Harrell   MR Number: [id number]   YOB: 1963   Date of Visit: 9/19/2019       Dear Dr. Ann Cee:    Thank you for referring Rhys Harrell to me for evaluation. Attached you will find relevant portions of my assessment and plan of care.    If you have questions, please do not hesitate to call me. I look forward to following Rhys Harrell along with you.    Sincerely,    Alec Appiah MD    Enclosure  CC:  No Recipients    If you would like to receive this communication electronically, please contact [email or web address] or [phone] to request more information on [facility] access.    For providers and/or their staff who would like to refer a patient to [facility], please contact us through our one-stop-shop provider referral line, [facility], at [phone].    If you feel you have received this communication in error or would no longer like to receive these types of communications, please e-mail [email or web address]          Cardiac

## 2020-08-06 NOTE — ED ADULT NURSE NOTE - NSIMPLEMENTINTERV_GEN_ALL_ED
Implemented All Fall Risk Interventions:  [place] to call system. Call bell, personal items and telephone within reach. Instruct patient to call for assistance. Room bathroom lighting operational. Non-slip footwear when patient is off stretcher. Physically safe environment: no spills, clutter or unnecessary equipment. Stretcher in lowest position, wheels locked, appropriate side rails in place. Provide visual cue, wrist band, yellow gown, etc. Monitor gait and stability. Monitor for mental status changes and reorient to person, place, and time. Review medications for side effects contributing to fall risk. Reinforce activity limits and safety measures with patient and family.

## 2020-08-06 NOTE — ED ADULT NURSE NOTE - CHPI ED NUR SYMPTOMS NEG
no diaphoresis/no fever/no nausea/no shortness of breath/no vomiting/no syncope/no congestion/no dizziness/no chest pain/no chills

## 2020-08-06 NOTE — ED PROVIDER NOTE - PROGRESS NOTE DETAILS
BP and heart rate improved. Seen by Dr. Owens and cleared for discharge home. Follow up with PCP/Cardiologist tomorrow.

## 2020-08-22 ENCOUNTER — EMERGENCY (EMERGENCY)
Facility: HOSPITAL | Age: 85
LOS: 1 days | Discharge: ANOTHER HOSPITAL | End: 2020-08-22
Attending: EMERGENCY MEDICINE | Admitting: EMERGENCY MEDICINE
Payer: MEDICARE

## 2020-08-22 ENCOUNTER — INPATIENT (INPATIENT)
Facility: HOSPITAL | Age: 85
LOS: 1 days | Discharge: HOME | DRG: 605 | End: 2020-08-24
Attending: INTERNAL MEDICINE | Admitting: INTERNAL MEDICINE
Payer: MEDICARE

## 2020-08-22 VITALS
DIASTOLIC BLOOD PRESSURE: 52 MMHG | TEMPERATURE: 98 F | HEART RATE: 54 BPM | RESPIRATION RATE: 14 BRPM | HEIGHT: 62 IN | WEIGHT: 115.08 LBS | SYSTOLIC BLOOD PRESSURE: 150 MMHG | OXYGEN SATURATION: 96 %

## 2020-08-22 VITALS
SYSTOLIC BLOOD PRESSURE: 157 MMHG | DIASTOLIC BLOOD PRESSURE: 65 MMHG | HEART RATE: 57 BPM | RESPIRATION RATE: 14 BRPM | OXYGEN SATURATION: 99 %

## 2020-08-22 VITALS
OXYGEN SATURATION: 100 % | HEIGHT: 62 IN | WEIGHT: 119.93 LBS | RESPIRATION RATE: 16 BRPM | HEART RATE: 61 BPM | TEMPERATURE: 98 F | DIASTOLIC BLOOD PRESSURE: 43 MMHG | SYSTOLIC BLOOD PRESSURE: 143 MMHG

## 2020-08-22 DIAGNOSIS — R55 SYNCOPE AND COLLAPSE: ICD-10-CM

## 2020-08-22 LAB
ALBUMIN SERPL ELPH-MCNC: 3.2 G/DL — LOW (ref 3.3–5)
ALP SERPL-CCNC: 53 U/L — SIGNIFICANT CHANGE UP (ref 30–120)
ALT FLD-CCNC: 25 U/L DA — SIGNIFICANT CHANGE UP (ref 10–60)
ANION GAP SERPL CALC-SCNC: 7 MMOL/L — SIGNIFICANT CHANGE UP (ref 5–17)
APPEARANCE UR: CLEAR — SIGNIFICANT CHANGE UP
APTT BLD: 22.9 SEC — LOW (ref 27.5–35.5)
AST SERPL-CCNC: 26 U/L — SIGNIFICANT CHANGE UP (ref 10–40)
BACTERIA # UR AUTO: ABNORMAL
BASOPHILS # BLD AUTO: 0 K/UL — SIGNIFICANT CHANGE UP (ref 0–0.2)
BASOPHILS NFR BLD AUTO: 0 % — SIGNIFICANT CHANGE UP (ref 0–2)
BILIRUB SERPL-MCNC: 0.6 MG/DL — SIGNIFICANT CHANGE UP (ref 0.2–1.2)
BILIRUB UR-MCNC: NEGATIVE — SIGNIFICANT CHANGE UP
BUN SERPL-MCNC: 30 MG/DL — HIGH (ref 7–23)
CALCIUM SERPL-MCNC: 8.5 MG/DL — SIGNIFICANT CHANGE UP (ref 8.4–10.5)
CHLORIDE SERPL-SCNC: 101 MMOL/L — SIGNIFICANT CHANGE UP (ref 96–108)
CO2 SERPL-SCNC: 24 MMOL/L — SIGNIFICANT CHANGE UP (ref 22–31)
COLOR SPEC: YELLOW — SIGNIFICANT CHANGE UP
CREAT SERPL-MCNC: 1.16 MG/DL — SIGNIFICANT CHANGE UP (ref 0.5–1.3)
DIFF PNL FLD: NEGATIVE — SIGNIFICANT CHANGE UP
EOSINOPHIL # BLD AUTO: 0 K/UL — SIGNIFICANT CHANGE UP (ref 0–0.5)
EOSINOPHIL NFR BLD AUTO: 0 % — SIGNIFICANT CHANGE UP (ref 0–6)
EPI CELLS # UR: SIGNIFICANT CHANGE UP
GLUCOSE SERPL-MCNC: 307 MG/DL — HIGH (ref 70–99)
GLUCOSE UR QL: 1000 MG/DL
HCT VFR BLD CALC: 37.5 % — SIGNIFICANT CHANGE UP (ref 34.5–45)
HGB BLD-MCNC: 12.2 G/DL — SIGNIFICANT CHANGE UP (ref 11.5–15.5)
INR BLD: 1.1 RATIO — SIGNIFICANT CHANGE UP (ref 0.88–1.16)
KETONES UR-MCNC: NEGATIVE — SIGNIFICANT CHANGE UP
LACTATE SERPL-SCNC: 2.7 MMOL/L — HIGH (ref 0.7–2)
LACTATE SERPL-SCNC: 4 MMOL/L — CRITICAL HIGH (ref 0.7–2)
LEUKOCYTE ESTERASE UR-ACNC: NEGATIVE — SIGNIFICANT CHANGE UP
LIDOCAIN IGE QN: 328 U/L — SIGNIFICANT CHANGE UP (ref 73–393)
LYMPHOCYTES # BLD AUTO: 1.42 K/UL — SIGNIFICANT CHANGE UP (ref 1–3.3)
LYMPHOCYTES # BLD AUTO: 5 % — LOW (ref 13–44)
MANUAL SMEAR VERIFICATION: SIGNIFICANT CHANGE UP
MCHC RBC-ENTMCNC: 31.4 PG — SIGNIFICANT CHANGE UP (ref 27–34)
MCHC RBC-ENTMCNC: 32.5 GM/DL — SIGNIFICANT CHANGE UP (ref 32–36)
MCV RBC AUTO: 96.4 FL — SIGNIFICANT CHANGE UP (ref 80–100)
MONOCYTES # BLD AUTO: 2.84 K/UL — HIGH (ref 0–0.9)
MONOCYTES NFR BLD AUTO: 10 % — SIGNIFICANT CHANGE UP (ref 2–14)
NEUTROPHILS # BLD AUTO: 24.16 K/UL — HIGH (ref 1.8–7.4)
NEUTROPHILS NFR BLD AUTO: 73 % — SIGNIFICANT CHANGE UP (ref 43–77)
NEUTS BAND # BLD: 12 % — HIGH (ref 0–8)
NITRITE UR-MCNC: NEGATIVE — SIGNIFICANT CHANGE UP
NRBC # BLD: 0 — SIGNIFICANT CHANGE UP
NRBC # BLD: SIGNIFICANT CHANGE UP /100 WBCS (ref 0–0)
PH UR: 6 — SIGNIFICANT CHANGE UP (ref 5–8)
PLAT MORPH BLD: NORMAL — SIGNIFICANT CHANGE UP
PLATELET # BLD AUTO: 264 K/UL — SIGNIFICANT CHANGE UP (ref 150–400)
POTASSIUM SERPL-MCNC: 4.8 MMOL/L — SIGNIFICANT CHANGE UP (ref 3.5–5.3)
POTASSIUM SERPL-SCNC: 4.8 MMOL/L — SIGNIFICANT CHANGE UP (ref 3.5–5.3)
PROT SERPL-MCNC: 6.4 G/DL — SIGNIFICANT CHANGE UP (ref 6–8.3)
PROT UR-MCNC: 30 MG/DL
PROTHROM AB SERPL-ACNC: 13.2 SEC — SIGNIFICANT CHANGE UP (ref 10.6–13.6)
RBC # BLD: 3.89 M/UL — SIGNIFICANT CHANGE UP (ref 3.8–5.2)
RBC # FLD: 13 % — SIGNIFICANT CHANGE UP (ref 10.3–14.5)
RBC BLD AUTO: NORMAL — SIGNIFICANT CHANGE UP
RBC CASTS # UR COMP ASSIST: SIGNIFICANT CHANGE UP /HPF (ref 0–4)
SARS-COV-2 RNA SPEC QL NAA+PROBE: SIGNIFICANT CHANGE UP
SODIUM SERPL-SCNC: 132 MMOL/L — LOW (ref 135–145)
SP GR SPEC: 1.01 — SIGNIFICANT CHANGE UP (ref 1.01–1.02)
TROPONIN I SERPL-MCNC: 0 NG/ML — LOW (ref 0.02–0.06)
UROBILINOGEN FLD QL: NEGATIVE MG/DL — SIGNIFICANT CHANGE UP
WBC # BLD: 28.42 K/UL — HIGH (ref 3.8–10.5)
WBC # FLD AUTO: 28.42 K/UL — HIGH (ref 3.8–10.5)
WBC UR QL: SIGNIFICANT CHANGE UP

## 2020-08-22 PROCEDURE — 87040 BLOOD CULTURE FOR BACTERIA: CPT

## 2020-08-22 PROCEDURE — A9579: CPT

## 2020-08-22 PROCEDURE — 99223 1ST HOSP IP/OBS HIGH 75: CPT

## 2020-08-22 PROCEDURE — 99285 EMERGENCY DEPT VISIT HI MDM: CPT | Mod: 25

## 2020-08-22 PROCEDURE — 85025 COMPLETE CBC W/AUTO DIFF WBC: CPT

## 2020-08-22 PROCEDURE — 96375 TX/PRO/DX INJ NEW DRUG ADDON: CPT | Mod: XU

## 2020-08-22 PROCEDURE — 72125 CT NECK SPINE W/O DYE: CPT

## 2020-08-22 PROCEDURE — 70450 CT HEAD/BRAIN W/O DYE: CPT | Mod: 26

## 2020-08-22 PROCEDURE — 82962 GLUCOSE BLOOD TEST: CPT

## 2020-08-22 PROCEDURE — 86769 SARS-COV-2 COVID-19 ANTIBODY: CPT

## 2020-08-22 PROCEDURE — 93010 ELECTROCARDIOGRAM REPORT: CPT

## 2020-08-22 PROCEDURE — 97163 PT EVAL HIGH COMPLEX 45 MIN: CPT | Mod: GP

## 2020-08-22 PROCEDURE — 74177 CT ABD & PELVIS W/CONTRAST: CPT | Mod: 26

## 2020-08-22 PROCEDURE — 72158 MRI LUMBAR SPINE W/O & W/DYE: CPT

## 2020-08-22 PROCEDURE — 71045 X-RAY EXAM CHEST 1 VIEW: CPT

## 2020-08-22 PROCEDURE — 96365 THER/PROPH/DIAG IV INF INIT: CPT | Mod: XU

## 2020-08-22 PROCEDURE — 74177 CT ABD & PELVIS W/CONTRAST: CPT

## 2020-08-22 PROCEDURE — 87086 URINE CULTURE/COLONY COUNT: CPT

## 2020-08-22 PROCEDURE — 99291 CRITICAL CARE FIRST HOUR: CPT

## 2020-08-22 PROCEDURE — 83605 ASSAY OF LACTIC ACID: CPT

## 2020-08-22 PROCEDURE — 72158 MRI LUMBAR SPINE W/O & W/DYE: CPT | Mod: 26

## 2020-08-22 PROCEDURE — 80048 BASIC METABOLIC PNL TOTAL CA: CPT

## 2020-08-22 PROCEDURE — 81001 URINALYSIS AUTO W/SCOPE: CPT

## 2020-08-22 PROCEDURE — 85027 COMPLETE CBC AUTOMATED: CPT

## 2020-08-22 PROCEDURE — 83036 HEMOGLOBIN GLYCOSYLATED A1C: CPT

## 2020-08-22 PROCEDURE — 85730 THROMBOPLASTIN TIME PARTIAL: CPT

## 2020-08-22 PROCEDURE — 73110 X-RAY EXAM OF WRIST: CPT | Mod: 26,LT

## 2020-08-22 PROCEDURE — 72125 CT NECK SPINE W/O DYE: CPT | Mod: 26

## 2020-08-22 PROCEDURE — 36415 COLL VENOUS BLD VENIPUNCTURE: CPT

## 2020-08-22 PROCEDURE — 85610 PROTHROMBIN TIME: CPT

## 2020-08-22 PROCEDURE — 80053 COMPREHEN METABOLIC PANEL: CPT

## 2020-08-22 PROCEDURE — 83690 ASSAY OF LIPASE: CPT

## 2020-08-22 PROCEDURE — 70450 CT HEAD/BRAIN W/O DYE: CPT

## 2020-08-22 PROCEDURE — 71275 CT ANGIOGRAPHY CHEST: CPT

## 2020-08-22 PROCEDURE — 71275 CT ANGIOGRAPHY CHEST: CPT | Mod: 26

## 2020-08-22 PROCEDURE — 93005 ELECTROCARDIOGRAM TRACING: CPT

## 2020-08-22 PROCEDURE — 99222 1ST HOSP IP/OBS MODERATE 55: CPT

## 2020-08-22 PROCEDURE — 71045 X-RAY EXAM CHEST 1 VIEW: CPT | Mod: 26

## 2020-08-22 PROCEDURE — 96361 HYDRATE IV INFUSION ADD-ON: CPT

## 2020-08-22 PROCEDURE — 84484 ASSAY OF TROPONIN QUANT: CPT

## 2020-08-22 RX ORDER — SODIUM CHLORIDE 9 MG/ML
1000 INJECTION INTRAMUSCULAR; INTRAVENOUS; SUBCUTANEOUS ONCE
Refills: 0 | Status: COMPLETED | OUTPATIENT
Start: 2020-08-22 | End: 2020-08-22

## 2020-08-22 RX ORDER — INSULIN LISPRO 100/ML
VIAL (ML) SUBCUTANEOUS
Refills: 0 | Status: DISCONTINUED | OUTPATIENT
Start: 2020-08-22 | End: 2020-08-24

## 2020-08-22 RX ORDER — GLUCAGON INJECTION, SOLUTION 0.5 MG/.1ML
1 INJECTION, SOLUTION SUBCUTANEOUS ONCE
Refills: 0 | Status: DISCONTINUED | OUTPATIENT
Start: 2020-08-22 | End: 2020-08-24

## 2020-08-22 RX ORDER — METOPROLOL TARTRATE 50 MG
50 TABLET ORAL
Refills: 0 | Status: DISCONTINUED | OUTPATIENT
Start: 2020-08-22 | End: 2020-08-24

## 2020-08-22 RX ORDER — DEXTROSE 50 % IN WATER 50 %
15 SYRINGE (ML) INTRAVENOUS ONCE
Refills: 0 | Status: DISCONTINUED | OUTPATIENT
Start: 2020-08-22 | End: 2020-08-24

## 2020-08-22 RX ORDER — VANCOMYCIN HCL 1 G
750 VIAL (EA) INTRAVENOUS ONCE
Refills: 0 | Status: COMPLETED | OUTPATIENT
Start: 2020-08-22 | End: 2020-08-22

## 2020-08-22 RX ORDER — RAMIPRIL 5 MG
1 CAPSULE ORAL
Qty: 0 | Refills: 0 | DISCHARGE

## 2020-08-22 RX ORDER — DEXTROSE 50 % IN WATER 50 %
25 SYRINGE (ML) INTRAVENOUS ONCE
Refills: 0 | Status: DISCONTINUED | OUTPATIENT
Start: 2020-08-22 | End: 2020-08-24

## 2020-08-22 RX ORDER — DEXTROSE 50 % IN WATER 50 %
12.5 SYRINGE (ML) INTRAVENOUS ONCE
Refills: 0 | Status: DISCONTINUED | OUTPATIENT
Start: 2020-08-22 | End: 2020-08-24

## 2020-08-22 RX ORDER — PIOGLITAZONE HYDROCHLORIDE 15 MG/1
1 TABLET ORAL
Qty: 0 | Refills: 0 | DISCHARGE

## 2020-08-22 RX ORDER — ONDANSETRON 8 MG/1
4 TABLET, FILM COATED ORAL ONCE
Refills: 0 | Status: COMPLETED | OUTPATIENT
Start: 2020-08-22 | End: 2020-08-22

## 2020-08-22 RX ORDER — AMLODIPINE BESYLATE 2.5 MG/1
5 TABLET ORAL DAILY
Refills: 0 | Status: DISCONTINUED | OUTPATIENT
Start: 2020-08-22 | End: 2020-08-24

## 2020-08-22 RX ORDER — PIPERACILLIN AND TAZOBACTAM 4; .5 G/20ML; G/20ML
3.38 INJECTION, POWDER, LYOPHILIZED, FOR SOLUTION INTRAVENOUS ONCE
Refills: 0 | Status: COMPLETED | OUTPATIENT
Start: 2020-08-22 | End: 2020-08-22

## 2020-08-22 RX ORDER — LISINOPRIL 2.5 MG/1
40 TABLET ORAL DAILY
Refills: 0 | Status: DISCONTINUED | OUTPATIENT
Start: 2020-08-22 | End: 2020-08-24

## 2020-08-22 RX ORDER — METOPROLOL TARTRATE 50 MG
1 TABLET ORAL
Qty: 0 | Refills: 0 | DISCHARGE

## 2020-08-22 RX ORDER — SODIUM CHLORIDE 9 MG/ML
1000 INJECTION, SOLUTION INTRAVENOUS
Refills: 0 | Status: DISCONTINUED | OUTPATIENT
Start: 2020-08-22 | End: 2020-08-24

## 2020-08-22 RX ORDER — SIMVASTATIN 20 MG/1
20 TABLET, FILM COATED ORAL AT BEDTIME
Refills: 0 | Status: DISCONTINUED | OUTPATIENT
Start: 2020-08-22 | End: 2020-08-24

## 2020-08-22 RX ORDER — AMLODIPINE BESYLATE 2.5 MG/1
1 TABLET ORAL
Qty: 0 | Refills: 0 | DISCHARGE

## 2020-08-22 RX ORDER — LEVOTHYROXINE SODIUM 125 MCG
25 TABLET ORAL DAILY
Refills: 0 | Status: DISCONTINUED | OUTPATIENT
Start: 2020-08-22 | End: 2020-08-24

## 2020-08-22 RX ORDER — MORPHINE SULFATE 50 MG/1
2 CAPSULE, EXTENDED RELEASE ORAL ONCE
Refills: 0 | Status: DISCONTINUED | OUTPATIENT
Start: 2020-08-22 | End: 2020-08-22

## 2020-08-22 RX ORDER — CHOLECALCIFEROL (VITAMIN D3) 125 MCG
1 CAPSULE ORAL
Qty: 0 | Refills: 0 | DISCHARGE

## 2020-08-22 RX ORDER — SIMVASTATIN 20 MG/1
1 TABLET, FILM COATED ORAL
Qty: 0 | Refills: 0 | DISCHARGE

## 2020-08-22 RX ORDER — THYROID 120 MG
1 TABLET ORAL
Qty: 0 | Refills: 0 | DISCHARGE

## 2020-08-22 RX ADMIN — Medication 750 MILLIGRAM(S): at 22:49

## 2020-08-22 RX ADMIN — SODIUM CHLORIDE 1000 MILLILITER(S): 9 INJECTION INTRAMUSCULAR; INTRAVENOUS; SUBCUTANEOUS at 11:41

## 2020-08-22 RX ADMIN — ONDANSETRON 4 MILLIGRAM(S): 8 TABLET, FILM COATED ORAL at 10:47

## 2020-08-22 RX ADMIN — SODIUM CHLORIDE 1000 MILLILITER(S): 9 INJECTION INTRAMUSCULAR; INTRAVENOUS; SUBCUTANEOUS at 10:47

## 2020-08-22 RX ADMIN — MORPHINE SULFATE 2 MILLIGRAM(S): 50 CAPSULE, EXTENDED RELEASE ORAL at 11:30

## 2020-08-22 RX ADMIN — Medication 250 MILLIGRAM(S): at 19:34

## 2020-08-22 RX ADMIN — SODIUM CHLORIDE 1000 MILLILITER(S): 9 INJECTION INTRAMUSCULAR; INTRAVENOUS; SUBCUTANEOUS at 13:23

## 2020-08-22 RX ADMIN — PIPERACILLIN AND TAZOBACTAM 3.38 GRAM(S): 4; .5 INJECTION, POWDER, LYOPHILIZED, FOR SOLUTION INTRAVENOUS at 13:50

## 2020-08-22 RX ADMIN — MORPHINE SULFATE 2 MILLIGRAM(S): 50 CAPSULE, EXTENDED RELEASE ORAL at 10:47

## 2020-08-22 RX ADMIN — PIPERACILLIN AND TAZOBACTAM 200 GRAM(S): 4; .5 INJECTION, POWDER, LYOPHILIZED, FOR SOLUTION INTRAVENOUS at 13:23

## 2020-08-22 NOTE — ED ADULT NURSE REASSESSMENT NOTE - NS ED NURSE REASSESS COMMENT FT1
pt provided ivett care. pt comfortable. no needs at this time. awaiting trauma consult. will monitor.
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
## 2020-08-22 NOTE — CONSULT NOTE ADULT - ASSESSMENT
87 year old female with leukocytosis, diarrhea and near syncope found to have abdominal hematoma and L5 vertebral body lesion with a PMHx of A-fib no longer on Coumadin, DM, Uterine carcinoma, HTN, HLD, Arthritis, Hypothyroidism, Constipation, Herniated intervertebral disc , hip surgery, h/o traumatic subarachnoid hemorrhage came in to the ED after she felt at home and developed stable pelvic hematoma.    Plan:    neuro stable: compared to imaging reviewed from 2011 there was a lesion at L5 vertebral body at that time. Recommend MRI with and without contrast for further evaluation however given overall medical condition and this chronic issue no indication for acute neurosurgical intervention at this time.   - Recommend pan cultures to r/o source of infection the patient might have.  -Recommend medical evaluation for her diarrhea.  - recommend continued evaluation for abdominal hematoma   - Continue care as per medical team.   discussed above with Dr. Patel will follow MRI results.

## 2020-08-22 NOTE — ED PROVIDER NOTE - SKIN, MLM
Skin warm, dry and intact. No evidence of rash. +2 areas of contusion L forearm/wrist on the palmar aspect of wrist.

## 2020-08-22 NOTE — ED PROVIDER NOTE - ENMT, MLM
Airway patent, Nasal mucosa clear. Mouth with normal mucosa. Throat has no vesicles, no oropharyngeal exudates and uvula is midline. Airway patent, Nasal mucosa clear. Mouth with normal mucosa. Throat has no vesicles, no oropharyngeal exudates and uvula is midline. no epistaxis.

## 2020-08-22 NOTE — H&P ADULT - NSHPLABSRESULTS_GEN_ALL_CORE
.  LABS:                         12.2   28.42 )-----------( 264      ( 22 Aug 2020 11:18 )             37.5         132<L>  |  101  |  30<H>  ----------------------------<  307<H>  4.8   |  24  |  1.16    Ca    8.5      22 Aug 2020 11:18    TPro  6.4  /  Alb  3.2<L>  /  TBili  0.6  /  DBili  x   /  AST  26  /  ALT  25  /  AlkPhos  53      PT/INR - ( 22 Aug 2020 13:21 )   PT: 13.2 sec;   INR: 1.10 ratio         PTT - ( 22 Aug 2020 13:21 )  PTT:22.9 sec  Urinalysis Basic - ( 22 Aug 2020 11:18 )    Color: Yellow / Appearance: Clear / S.015 / pH: x  Gluc: x / Ketone: Negative  / Bili: Negative / Urobili: Negative mg/dL   Blood: x / Protein: 30 mg/dL / Nitrite: Negative   Leuk Esterase: Negative / RBC: 0-2 /HPF / WBC 0-2   Sq Epi: x / Non Sq Epi: Few / Bacteria: Few        Lactate, Blood: 2.7 mmol/L ( @ 13:58)  Lactate, Blood: 4.0 mmol/L ( @ 12:50)      RADIOLOGY, EKG & ADDITIONAL TESTS: Reviewed.   < from: CT Abdomen and Pelvis w/ IV Cont (20 @ 12:36) >      EXAM:  CT ABDOMEN AND PELVIS IC                          EXAM:  CT ANGIO CHEST (W)AW IC                                  PROCEDURE DATE:  2020          INTERPRETATION:  CLINICAL INFORMATION: Upper back and left shoulder pain, abdominal pain, diarrhea    COMPARISON: None.    PROCEDURE:  CT Angiography of the Chest was performed followed by portal venous phase imaging of the Abdomen and Pelvis.  IV Contrast: 90 ml of Omnipaque 350 was injected intravenously. 10 ml were discarded.  Oral contrast: None.  Sagittal and coronal reformats were performed as well as 3D (MIP) reconstructions.    FINDINGS:  CHEST:  LUNGS AND LARGE AIRWAYS: Patent central airways. No pulmonary nodules.  PLEURA: No pleural effusion.  VESSELS: Excellent pulmonary arterial opacification. No evidence of embolism. Normal caliber aorta. Extensive coronary artery calcification.  HEART: Heart size is normal. No pericardial effusion.  MEDIASTINUM AND BLANCHE: No lymphadenopathy.  CHEST WALL AND LOWER NECK: Within normal limits.    ABDOMEN AND PELVIS:  LIVER: Within normal limits.  BILE DUCTS: Mild biliary ductal dilatation, most likely due to postcholecystectomy state.  GALLBLADDER: Cholecystectomy.  SPLEEN: Within normal limits.  PANCREAS: Mild pancreatic ductal dilatation, measuring up to 5 mm. The duct can be followed to the ampulla, without evidence of duct cut off sign or mass.  ADRENALS: Within normal limits.  KIDNEYS/URETERS: Bilateral renal cysts. 7 mm hypodense nodule in the posterior aspect of the left kidney, indeterminate. Additional subcentimeter foci in both kidneys are too small to characterize. No hydronephrosis, stone or mass.    BLADDER: Within normal limits.  REPRODUCTIVE ORGANS: Hysterectomy.    BOWEL: No bowel obstruction. Appendix not definitively identified. Severe colonic diverticulosis, without diverticulitis.  PERITONEUM: No ascites.  VESSELS: Atherosclerotic changes.  RETROPERITONEUM/LYMPH NODES: No lymphadenopathy.  ABDOMINAL WALL: Within normal limits.  BONES: 2.3 cm soft tissue mass involving the posterior aspect of the L5 vertebral body and extending into the spinal canal, with smooth, predominantly sclerotic borders of the bones surrounding the mass, suggestive of a chronic process. No acute fracture. L2 vertebral body hemangioma.    IMPRESSION:  No pulmonary embolism.  Moderate size acute hematoma in the anterior pelvis. No associated fracture identified.  2.3 cm soft tissue mass involving the L5 vertebral body and extending into the spinal canal. MRI recommended tofurther evaluate.    COVID-19 PCR: Claude (22 Aug 2020 14:37)

## 2020-08-22 NOTE — ED ADULT NURSE NOTE - CHIEF COMPLAINT QUOTE
Pt. to the ED BIBA from [facility] for admission. Pt. C/O Diarrhea and general weakness x 2 days. Pt. reports Pelvic Hematoma from fall this morning. CT performed PTA

## 2020-08-22 NOTE — ED PROVIDER NOTE - CARDIOVASCULAR [+], MLM
+R chest discomfort CHEST PAIN/+R chest discomfort, fall (from near syncope? motor weakness? vs. other causes ?)

## 2020-08-22 NOTE — ED ADULT NURSE NOTE - NSINTERVENTIONOPT_GEN_ALL_ED
Enhanced Supervision/Hourly Rounding/Chair Alarms/Stretcher Alarms/Move Toilet (commode/urinal) to patient using "arms reach"

## 2020-08-22 NOTE — ED PROVIDER NOTE - PROGRESS NOTE DETAILS
Tim Gomez: pt's son mauro number  Tim Gomez: spoke to surgical resident on behalf of Dr. Walker surgical resident will come to see pt, is in a case now which will take 1-1.5 hr, will come see pt as soon as case is over Tim Bowers for attending Dr. Gomez: Spoke with surgical resident on behalf of Dr. Walker. States that this hematoma is not acute and that pt does not require acute intervention. Reviewed surgical note, recommends medical management. Spoke with Dr. Roberts. Pt noted to have mass at L5. 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 Contacted Dr. Rome at [phone] who approved of MRI. Order placed. MRI tech paged in. Pt accepted to CICU. Pt to not be moved until MRI is done and has resulted. I Lacy Bowers attest that this documentation has been prepared under the direction and in the presence of Doctor Gomez. Tim Bowers for attending Dr. Gomez: Spoke with Nicholas, pt's son and updated on results of labs and imaging. Asking if pt safe for MRI. Pt had hip surgery in 2003 and does not recall when MRI was done but had MRI recently. Asked pt, states MRI done after surgery. Does not have pacemaker or any other metallic bodies. Tim Bowers for attending Dr. Gomez: Spoke with neurosurgery as requested by medicine. Due to finding of mass, neurosurgery agreeable with MRI. Will come see pt in ED. Neuro consult appreciated. Tim Gomez: spoke to surgical resident on behalf of Dr. Walker surgical resident will come to see pt, is in a case now which will take 1-1.5 hr, will come see pt as soon as case is over. Informed and apologized patient for the inevitable delay prior to surgical eval. Tim Bowers for attending Dr. Gomez: Spoke with surgical resident on behalf of Dr. Walker. States that this hematoma is not acute and that pt does not require acute intervention. Reviewed surgical note, recommends medical management. Spoke with Dr. Roberts. 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 Contacted Dr. Rome at [phone] who approved of MRI. Order placed. MRI tech paged in. Pt accepted to CICU. Pt to not be moved until MRI is done and has resulted.

## 2020-08-22 NOTE — ED PROVIDER NOTE - CARE PLAN
Principal Discharge DX:	Pelvic hematoma, female  Secondary Diagnosis:	Leukocytosis  Secondary Diagnosis:	Near syncope

## 2020-08-22 NOTE — ED ADULT NURSE NOTE - NSIMPLEMENTINTERV_GEN_ALL_ED
Implemented All Fall Risk Interventions:  [place] to call system. Call bell, personal items and telephone within reach. Instruct patient to call for assistance. Room bathroom lighting operational. Non-slip footwear when patient is off stretcher. Physically safe environment: no spills, clutter or unnecessary equipment. Stretcher in lowest position, wheels locked, appropriate side rails in place. Provide visual cue, wrist band, yellow gown, etc. Monitor gait and stability. Monitor for mental status changes and reorient to person, place, and time. Review medications for side effects contributing to fall risk. Reinforce activity limits and safety measures with patient and family.

## 2020-08-22 NOTE — ED PROVIDER NOTE - NS_ATTENDINGSCRIBE_ED_ALL_ED
2014 NIL pap. (Found in Care Everywhere).  9/3/19 LSIL pap. Plan colp. (MRA)  09/09/19:Msg left to call back. (sk)  09/10/19: Msg left to call back. (sk) Advised of result and follow up. (lks)  9/20/19 [place] Bx & ECC - Negative. Plan cotest in 1 year. (aishwarya)  9/30/19 Pt viewed result on MyChart (aishwarya)   I personally performed the service described in the documentation recorded by the scribe in my presence, and it accurately and completely records my words and actions.

## 2020-08-22 NOTE — ED PROVIDER NOTE - CPE EDP CARDIAC NORM
[facility]  [id number] [place] [address] [address] [place] [address]           Patient Discharge Instructions     Our goal is to set you up for success. This packet includes information on your condition, medications, and your home care. It will help you to care for yourself so you don't get sicker and need to go back to the hospital.     Please ask your nurse if you have any questions.        There are many details to remember when preparing to leave the hospital. Here is what you will need to do:    1. Take your medicine. If you are prescribed medications, review your Medication List in the following pages. You may have new medications to  at the pharmacy and others that you'll need to stop taking. Review the instructions for how and when to take your medications. Talk with your doctor or nurses if you are unsure of what to do.     2. Go to your follow-up appointments. Specific follow-up information is listed in the following pages. Your may be contacted by a transition nurse or clinical provider about future appointments. Be sure we have all of the phone numbers to reach you, if needed. Please contact your provider's office if you are unable to make an appointment.     3. Watch for warning signs. Your doctor or nurse will give you detailed warning signs to watch for and when to call for assistance. These instructions may also include educational information about your condition. If you experience any of warning signs to your health, call your doctor.               [facility]  Unless otherwise directed by your provider, please contact [facility], our nurse care line that is available for 24/7 assistance.     [phone] (toll-free)    Registered nurses in the [facility] provide clinical advisement, health education, appointment booking, and other advisory services.                    ** Verify the list of medication(s) below is accurate and up to date. Carry this with you  in case of emergency. If your medications have changed, please notify your healthcare provider.             Medication List      START taking these medications        Additional Info                      oxycodone-acetaminophen 5-325 mg per tablet   Commonly known as:  ROXICET   Quantity:  16 tablet   Refills:  0   Dose:  1 tablet    Instructions:  Take 1 tablet by mouth every 6 (six) hours as needed.     Begin Date    AM    Noon    PM    Bedtime         CONTINUE taking these medications        Additional Info                      ferrous sulfate 325 mg (65 mg iron) Tab tablet   Quantity:  30 tablet   Refills:  6   Dose:  325 mg    Instructions:  Take 1 tablet (325 mg total) by mouth once daily.     Begin Date    AM    Noon    PM    Bedtime       prenatal vit#103-iron-FA 27 mg iron- 1 mg Tab   Commonly known as:     Quantity:  30 tablet   Refills:  11   Dose:  1 tablet    Instructions:  Take 1 tablet by mouth once daily.     Begin Date    AM    Noon    PM    Bedtime         STOP taking these medications     hydrocodone-acetaminophen 7.5-325mg 7.5-325 mg per tablet   Commonly known as:  NORCO            Where to Get Your Medications      These medications were sent to [facility]/pharmacy #[id number] - [place] [id number] [place] [address]     Phone:  [phone]     oxycodone-acetaminophen 5-325 mg per tablet                  Please bring to all follow up appointments:    1. A copy of your discharge instructions.  2. All medicines you are currently taking in their original bottles.  3. Identification and insurance card.    Please arrive 15 minutes ahead of scheduled appointment time.    Please call 24 hours in advance if you must reschedule your appointment and/or time.        Your Scheduled Appointments     Jan 23, 2017  9:00 AM CST   Nurse Visit with OB-GYN NURSE, [facility]   [facility] ([place]    [address] [address]  [place] [id number] [phone]            Feb 27,  "2017  9:00 AM CST   Post Partum with Robina Barron MD   [facility]    [address] [address] [phone]              Follow-up Information     Follow up with Robina Barron MD On 2/27/2017.    Specialties:  Obstetrics, Obstetrics and Gynecology    Why:  9am [facility] post op csectin & BTL    Contact information:    [address] [facility] [place] [address] [phone]          Follow up with Robina Barron MD In 4 weeks.    Specialties:  Obstetrics, Obstetrics and Gynecology    Contact information:    [address] [facility] [place] [address] [phone]          Discharge Instructions     Future Orders    Activity as tolerated     Call MD for:  persistent nausea and vomiting or diarrhea     Call MD for:  redness, tenderness, or signs of infection (pain, swelling, redness, odor or green/yellow discharge around incision site)     Call MD for:  severe uncontrolled pain     Call MD for:  temperature >100.4     Comments:    Call for persistent temp >100.4 twice over 4 hrs    Diet general     Questions:    Total calories:      Fat restriction, if any:      Protein restriction, if any:      Na restriction, if any:      Fluid restriction:      Additional restrictions:      No dressing needed         Discharge Instructions       Mother Self Care:    Activity: Avoid strenuous exercise and get adequate rest.  No driving until the physician consent given.  Emotional Changes: Most women find birth to be a time of great emotional upheaval.  Sense of loss, mood swings, fatigue, anxiety, and feeling "let down" are common.  If feelings worsen or last more than a week, call your physician.  Breast Care/Breastfeeding: Wear a bra for comfort.  Keep nipples dry and apply your own breast milk or lanolin cream as needed for soreness.  Engorgement can be relieved with warm, moist heat before feedings.  You may also take Ibuprofen.  Breast Care/Bottle Feeding: Wear support bra 24 hours a day " for one week.  Avoid stimulation to breasts.  You may use ice packs for discomfort.  Jimbo-Care/Vaginal Bleeding: Remember to use your jimbo-bottle after urinating.  Your flow will change from red, to pink, to yellow/white color over a period of 2 weeks.  Menstruation will return in 3-8 weeks, or longer if breastfeeding.  Episiotomy Vaginal Delivery: Stitches will dissolve within 10 days to 3 weeks.  Warm baths, tucks, and dermoplast will promote healing.  Avoid bubble baths or strong soaps.   Section/Tubal Ligation: Keep incision clean and dry.  Please remove steri-strips in 5-7 days.  You may shower, but avoid baths.  Sexual Activity/Pelvic Rest: No sexual activity, tampons, or douching until your physician gives you consent.  Diet: Continue to eat from the five basic food groups, including plenty of protein, fruits, vegetables, and whole grains.  Limit empty calories and high fat foods.  Drink enough fluids to satisfy thirst and add an extra 500 calories for breastfeeding.  Constipation/Hemorrhoids: Drink plenty of water.  You may take a stool softener or natural laxative (Metamucil). You may use tucks or hemorrhoid ointment and soak in a warm tub.    CALL YOUR OB DOCTOR IF ANY OF THE FOLLOWING OCCURS:  *Heavy bleeding - saturating a pad an hour or passing any large (2-3 inches in size) blood clots.  *Any pain, redness, or tenderness in lower leg.  *You cannot care for yourself or your baby.  *Any signs of infection-      - Temperature greater than 100.5 degrees F      - Foul smelling vaginal discharge and/or incisional drainage      - Increased episiotomy or incisional pain      - Hot, hard, red or sore area on breast      - Flu-like symptoms      - Any urgency, frequency or burning with urination      Primary Diagnosis     Your primary diagnosis was:  Previous  ( Delivery)      Admission Information     Date & Time Provider Department CSN    2017  6:03 AM Alise Estrada MD [facility]  "[facility] [id number]      Care Providers     Provider Role Specialty Primary office phone    Alise Estrada MD Attending Provider Obstetrics and Gynecology [phone]    Robina Barron MD Surgeon  Obstetrics [phone]      Your Vitals Were     BP Pulse Temp Resp Height Weight    110/51 68 97.6 °F (36.4 °C) (Oral) 18 5' 6" (1.676 m) 64.9 kg (143 lb)    Last Period SpO2 BMI          04/16/2016 100% 23.08 kg/m2        Recent Lab Values     No lab values to display.      Pending Labs     Order Current Status    Specimen to Pathology - Surgery In process      Allergies as of 1/18/2017        Reactions    Sulfa (Sulfonamide Antibiotics)       Advance Directives     An advance directive is a document which, in the event you are no longer able to make decisions for yourself, tells your healthcare team what kind of treatment you do or do not want to receive, or who you would like to make those decisions for you.  If you do not currently have an advance directive, [facility] encourages you to create one.  For more information call:  [phone]), [phone] ([phone]),  or log on to [email or web address].        Smoking Cessation     If you would like to quit smoking:   You may be eligible for free services if you are a [place] resident and started smoking cigarettes before September 1, 1988.  Call the Smoking Cessation Trust ([facility]) toll free at (829) 014-7033 or (521) 434-2926.   Call 1-800-QUIT-NOW if you do not meet the above criteria.            Language Assistance Services     ATTENTION: Language assistance services are available, free of charge. Please call [phone].      ATENCIÓN: Si habla español, tiene a mckeon disposición servicios gratuitos de asistencia lingüística. Llame al [phone].     CHÚ Ý: N?u b?n nói Ti?ng Vi?t, có các d?ch v? h? tr? ngôn ng? mi?n phí dành cho b?n. G?i s? [phone].        [facility] Sign-Up     Activating your [facility] account is as easy " as 1-2-3!     1) Visit [email or web address], select Sign Up Now, enter this activation code and your date of birth, then select Next.  [email or web address]  Expires: 3/4/2017  9:26 AM      2) Create a username and password to use when you visit [facility] in the future and select a security question in case you lose your password and select Next.    3) Enter your e-mail address and click Sign Up!    Additional Information  If you have questions, please e-mail [email or web address] or call [phone] to talk to our [email or web address] staff. Remember, [facility] is NOT to be used for urgent needs. For medical emergencies, dial 911.          [facility] complies with applicable Federal civil rights laws and does not discriminate on the basis of race, color, national origin, age, disability, or sex.         normal...

## 2020-08-22 NOTE — PROVIDER CONTACT NOTE (CRITICAL VALUE NOTIFICATION) - PERSON GIVING RESULT:
ED GI/





- General


Chief Complaint: Abdominal Pain


Stated Complaint: NAUSEA, VOMITING, DIARRHEA


Time Seen by Provider: 12/30/18 12:31


Notes: 





55-year-old male presents with left lower quadrant abdominal discomfort nausea 

vomiting and some diarrhea starting this morning.  The patient stated he is 

never really had this before.  Denies fever chills.  Denies sore throat cough 

muscle aches.  Complains of aching left lower quadrant abdominal pain he rates 

it as severe.  He denies any black bloody or tarry stools denies hematemesis 

stated he has thrown up green.  Denies any strange or food out of his normal 

routine.  Denies night sweats cough hemoptysis or gland swelling denies 

hematuria or dysuria.


TRAVEL OUTSIDE OF THE [place] IN LAST 30 DAYS: No





- Related Data


Allergies/Adverse Reactions: 


                                        





No Known Allergies Allergy (Verified 12/30/18 12:20)


   











Past Medical History





- Social History


Smoking Status: Never Smoker


Family History: Reviewed & Not Pertinent


Renal/ Medical History: Denies: Hx Peritoneal Dialysis





Review of Systems





- Review of Systems


Constitutional: denies: Chills, Fever


EENT: denies: Throat swelling


Cardiovascular: denies: Chest pain, Dyspnea, Edema


Respiratory: denies: Cough, Hemoptysis, Short of breath


Gastrointestinal: Abdominal pain, Diarrhea, Nausea, Vomiting.  denies: 

Constipation, Blood streaked bowels, Black stools, Rectal bleeding


Musculoskeletal: denies: Back pain


Skin: denies: Rash


Neurological/Psychological: denies: Headaches


-: Yes All other systems reviewed and negative





Physical Exam





- Vital signs


Vitals: 


                                        











Temp Pulse Resp BP Pulse Ox


 


 97.9 F   78   16   146/91 H  98 


 


 12/30/18 12:29  12/30/18 12:29  12/30/18 12:29  12/30/18 12:29  12/30/18 12:29














- Notes


Notes: 





GENERAL_APPEARANCE: well_nourished, alert, cooperative, appears uncomfortable


 VITALS: reviewed, see vital signs table.


 HEAD: no_swelling\tenderness on the head.


 EYES: PERRL, EOMI, conjunctiva_clear.


 NOSE: no_nasal_discharge.


 MOUTH: (-)decreased moisture.


 THROAT: no_throat_inflammation, no_airway_obstruction. no_lymphadenopathy


 NECK: supple, no_neck_tenderness, (-)thyromegaly.


 BACK: no_back_tenderness.


 CHEST_WALL: no_chest_tenderness.


 LUNGS: no_wheezing, no_rales, no_rhonchi, (-)accessory muscle use, good air 

exchange bilateral.


 HEART: normal_rate, normal_rhythm, normal_S1, normal_S2, (-)S3, (-)S4, 

no_murmur, no_rub.


 ABDOMEN: Diminished_BS, soft, lower quadrant_abd_tenderness, (-)guarding, (-

)rebound, no_organomegaly, no_abd_masses.


 EXTREMITIES:  good pulses in all_extremities, no_swelling\tenderness in the 

extremities, no_edema.


 SKIN: warm, dry, good_color, no_rash.


 MENTAL_STATUS: speech_clear, oriented_X_3, normal_affect, responds_appropri

ately to questions.











Course





- Re-evaluation


Re-evalutation: 





12/30/18 12:38


55-year-old male presents with left lower quadrant abdominal pain nausea 

vomiting diarrhea.  This may be viral in nature.  Patient may have colitis.  

Diverticulitis is a consideration.  We will get a CT to further assess lab work.

 Urinalysis.





12/30/18 14:29








                                        





Abdomen/Pelvis CT  12/30/18 12:35


IMPRESSION:  1. Potential mild ileus.  Fluid-filled loops of small and large 

bowel but no evidence of mechanical obstruction.  2. While there is mild 

diverticulosis in the distal colon, no clear CT evidence of active 

diverticulitis.


 








CT does show a mild ileus.  Lab work was fairly reassuring there is a little bit

of hematuria but no infection.  I spoke with the patient about this she should 

likely follow up with urology.





Patient had near completely relief with Toradol.  I will prescribe him some 

nausea medication Reglan for home.  Have him follow-up with his doctor or if he 

starts to have symptoms again and does not improve with the home medicine to 

return to the ER.  I do not believe he is obstructed.  His repeat abdominal exam

is soft and supple he has complete relief of pain and is not nauseous.  Think 

this is likely viral in origin.  However I did speak with the patient about this

some disease processes do not declared himself right away and will do so over 

time and follow-up as the most important factor if things progress.








- Vital Signs


Vital signs: 


                                        











Temp Pulse Resp BP Pulse Ox


 


 97.9 F   78   16   146/91 H  98 


 


 12/30/18 12:29  12/30/18 12:29  12/30/18 12:29  12/30/18 12:29  12/30/18 12:29














- Laboratory


Result Diagrams: 


                                 12/30/18 12:58





                                 12/30/18 12:58


Laboratory results interpreted by me: 


                                        











  12/30/18 12/30/18 12/30/18





  12:58 12:58 13:09


 


Seg Neuts % (Manual)  97 H  


 


Lymphocytes % (Manual)  2 L  


 


Monocytes % (Manual)  1 L  


 


Abs Neuts (Manual)  8.9 H  


 


Abs Lymphs (Manual)  0.2 L  


 


BUN   23 H 


 


Glucose   116 H 


 


Total Protein   8.4 H 


 


Urine Blood    MODERATE H














Discharge





- Discharge


Clinical Impression: 


 Ileus, Vomiting and diarrhea





Condition: Good


Disposition: HOME, SELF-CARE


Instructions:  Abdominal Pain ([facility]), Antispasmodics ([facility])


Prescriptions: 


Dicyclomine HCl [Bentyl 20 mg Tablet] 20 mg PO QID #30 tablet


Metoclopramide HCl [Reglan 10 mg Tablet] 1 tab PO ASDIR PRN #25 tablet


 PRN Reason: Melyssa Desai

## 2020-08-22 NOTE — ED PROVIDER NOTE - PLAN OF CARE
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 Plan: MRI, NSG cs, admission  Problem: Pelvic hematoma: Plan: Unclear if chronic, new, or incidental, if traumatic vs. related to hx of carcinoma, further inpatient evaluation is needed. Surgery consulted, surgery does not recommend immediate intervention.  Problem: Frail old lady, with no immediate social support at home: Not safe to go home.

## 2020-08-22 NOTE — ED ADULT NURSE NOTE - OBJECTIVE STATEMENT
pt awake, alert and oriented. presents to the ED transfer from [place]. pt reports that she went to the hospital this AM r/t weakness and near syncope and fall after many bouts of diarrhea. pt treated at [facility] and states that she is feeling better. reports hitting right side of head and left hand during fall onto carpet. -LOC but states that she felt like she was going to pass out. found at [place] to have an acute pelvic hematoma and transferred to [place] for admission.

## 2020-08-22 NOTE — ED ADULT TRIAGE NOTE - CHIEF COMPLAINT QUOTE
Pt. to the ED BIBA from [facility] for admission. Pt. C/O Diarrhea and general weakness x 2 days. Pt. reports Pelvic Hematoma from fall this morning. CT performed PTA

## 2020-08-22 NOTE — H&P ADULT - NSHPPHYSICALEXAM_GEN_ALL_CORE
PHYSICAL EXAM:      Constitutional: NAD, well-groomed, well-developed  HEENT: PERRLA, EOMI, Normal Hearing, MMM periorbital ecchymosis   Neck: No LAD, No JVD  Back: Normal spine flexure, No CVA tenderness  Respiratory: CTABCardiovascular: S1 and S2, RRR, no M/G/R  Gastrointestinal: BS+, soft, NT/ND  Extremities: No peripheral edema  Vascular: 2+ peripheral pulses  Neurological: A/O x 3, no focal deficits  Psychiatric: Normal mood, normal affect  Musculoskeletal: 5/5 strength b/l upper and lower extremities  Skin: No rashes

## 2020-08-22 NOTE — ED PROVIDER NOTE - CLINICAL SUMMARY MEDICAL DECISION MAKING FREE TEXT BOX
pt with near syncope, dizziness, upper back pain, abd pain, diarrhea, fall - ekg/xr/ct/labs/ivf/morphine/zofran

## 2020-08-22 NOTE — ED ADULT NURSE NOTE - NSIMPLEMENTINTERV_GEN_ALL_ED
Implemented All Fall with Harm Risk Interventions:  [place] to call system. Call bell, personal items and telephone within reach. Instruct patient to call for assistance. Room bathroom lighting operational. Non-slip footwear when patient is off stretcher. Physically safe environment: no spills, clutter or unnecessary equipment. Stretcher in lowest position, wheels locked, appropriate side rails in place. Provide visual cue, wrist band, yellow gown, etc. Monitor gait and stability. Monitor for mental status changes and reorient to person, place, and time. Review medications for side effects contributing to fall risk. Reinforce activity limits and safety measures with patient and family. Provide visual clues: red socks.

## 2020-08-22 NOTE — ED PROVIDER NOTE - PROGRESS NOTE DETAILS
d/w [facility] er dr godinez will accept xfer er->er. xfer center to notify trauma md at [facility] xfer center relates [place] [facility] will upgrade xfer to tier 1

## 2020-08-22 NOTE — H&P ADULT - ASSESSMENT
This is an 86 yo female here for with a history of uterine CA now admitted s/p recent fall and found to have abdominal hematoma and L5 soft tissue mass with nerve encroachment     Fall   - neurologically intact with no focal deficits.   - would reccomend neuro checks over night q 4 hours     Hematoma   - continue to check serial CBCs   - H/H presently stable   - Would hold on evacuation     Uterine CA   - remote history as diagnosed in 2009     L5 soft tissue mass   - neurosurgery evaluation noted   - imaging reviewed by team and recent MRI has been compared to prior from several years ago and does not suggest significant progression   - would reccomend possible RT consultation to consider possible palliative RT to lesion     DVT ppx: ICDs as patient with hematoma This is an 86 yo female here for with a history of uterine CA now admitted s/p recent fall and found to have abdominal hematoma and L5 soft tissue mass with nerve encroachment with no neurologic symptoms and prior imaging 2011 appears to have similar findings.     Fall   - clinically sounds mechanical as patient openly admits to having had a mistep on the stairs  - neurologically intact with no focal deficits.   - would reccomend neuro checks over night q 4 hours     Leukocytosis  - likely reactive from ecchymosis   - no overt signs of infection   - neutrophillic predominant     Hematoma   - continue to check serial CBCs   - H/H presently stable   - Would hold on evacuation     Uterine CA   - remote history as diagnosed in 1976    L5 soft tissue mass   - neurosurgery evaluation noted   - imaging reviewed by team and recent MRI has been compared to prior from several years ago and does not suggest significant progression   - would reccomend possible RT consultation to consider possible palliative RT to lesion     DM II   - R-ISS     Afib   - on BB   - Hold amiodarone as patient notes that her symptoms of weakness seems to coincide with initiation of amio   - rate controlled   - high fall risk no full dose AC   - will hold asa given hematoma     CODE STATUS: DNR/DNI     DVT ppx: ICDs as patient with hematoma This is an 88 yo female here for with a history of uterine CA now admitted s/p recent fall and found to have abdominal hematoma and L5 soft tissue mass with nerve encroachment with no neurologic symptoms and prior imaging 2011 appears to have similar findings.     Fall   - clinically sounds mechanical as patient openly admits to having had a mistep on the stairs  - neurologically intact with no focal deficits.   - would reccomend neuro checks over night q 4 hours     Leukocytosis  - likely reactive from ecchymosis   - no overt signs of infection   - neutrophillic predominant     Hematoma   - continue to check serial CBCs   - H/H presently stable   - Would hold on evacuation     Uterine CA   - remote history as diagnosed in 1976    L5 soft tissue mass   - neurosurgery evaluation noted   - imaging reviewed by team and recent MRI has been compared to prior from several years ago and does not suggest significant progression   - would reccomend possible RT consultation to consider possible palliative RT to lesion     DM II   - R-ISS   - hold oral hypoglycemics    Afib   - on BB   - Hold amiodarone as patient notes that her symptoms of weakness seems to coincide with initiation of amio   - rate controlled   - high fall risk no full dose AC   - will hold asa given hematoma     CODE STATUS: DNR/DNI - MOLST COMPLETED IN ED    DVT ppx: ICDs as patient with hematoma This is an 88 yo female here for with a history of uterine CA now admitted s/p recent fall and found to have abdominal hematoma and L5 soft tissue mass with nerve encroachment with no neurologic symptoms and prior imaging 2011 appears to have similar findings.     Fall   - clinically sounds mechanical as patient openly admits to having had a mistep on the stairs  - neurologically intact with no focal deficits.   - would reccomend neuro checks over night q 4 hours     Leukocytosis  - likely reactive from ecchymosis   - no overt signs of infection   - neutrophillic predominant     Hematoma   - continue to check serial CBCs   - H/H presently stable   - Would hold on evacuation     Uterine CA   - remote history as diagnosed in 1976    L5 soft tissue mass   - neurosurgery evaluation noted   - imaging reviewed by team and recent MRI has been compared to prior from several years ago and does not suggest significant progression   - would consider possible RT consultation to consider possible palliative RT to lesion   will await official MRI report prior to RT consult as RT may require tissue diagnosis prior to proceeding with RT.   - in the absence of focal deficits and overall chronicity of lesion as demonstrated from 2011 can consider close observation       DM II   - R-ISS   - hold oral hypoglycemics    Afib   - on BB   - Hold amiodarone as patient notes that her symptoms of weakness seems to coincide with initiation of amio   - rate controlled   - high fall risk no full dose AC   - will hold asa given hematoma     CODE STATUS: DNR/DNI - MOLST COMPLETED IN ED    DVT ppx: ICDs as patient with hematoma

## 2020-08-22 NOTE — ED PROVIDER NOTE - CLINICAL SUMMARY MEDICAL DECISION MAKING FREE TEXT BOX
Pt already received CT with contrast of head neck chest and abd. CT scan shows evidence of pelvic hematoma, also evidence of elevated wbc, did not find evidence of x ray of L wrist, will perform x ray of L wrist. Will contact trauma on call. Pt has to be admitted for syncope and pelvic hematoma.

## 2020-08-22 NOTE — CONSULT NOTE ADULT - SUBJECTIVE AND OBJECTIVE BOX
Patient is a 87y old  Female who presents with a chief complaint of near syncopal episode and fall earlier today and transfer from [place] for trauma evaluation. Patient denies urinary incontinence, numbness or weakness. States she was able to garden yesterday and whenever she has a little pain she takes tylenol. Able to walk without assistance.  Pt went to [place] ED this morning c/o diarrhea, lightheadedness, generalized weakness, near syncope x today. Pt missed a step and fell onto R side this morning. c/o R sided chest discomfort. Pt reports CT at [place] showed pelvic hematoma and has an elevated WBC. On aspirin.  Denies any recent malaise or feeling ill other than today.         PAST MEDICAL & SURGICAL HISTORY:  Cancer of ureter  MI (myocardial infarction)  DM (diabetes mellitus)  Afib on aspirin  Hypertension  SAH- due to fall in 2019     Allergies    No Known Allergies      REVIEW OF SYSTEMS  Negative except as noted in HPI  CONSTITUTIONAL: No fever, weight loss, or fatigue  EYES: No eye pain, visual disturbances, or discharge  ENMT:  No difficulty hearing, tinnitus, vertigo; No sinus or throat pain  NECK: No pain or stiffness  BREASTS: No pain, masses, or nipple discharge  RESPIRATORY: No cough, wheezing, chills or hemoptysis; No shortness of breath  CARDIOVASCULAR: No chest pain, palpitations, dizziness, or leg swelling  GASTROINTESTINAL: No abdominal or epigastric pain. No nausea, vomiting, or hematemesis; No diarrhea or constipation. No melena or hematochezia.  GENITOURINARY: No dysuria, frequency, hematuria, or incontinence  NEUROLOGICAL: No headaches, memory loss, loss of strength, numbness, or tremors  SKIN: slight bruise to right side of face.    LYMPH NODES: No enlarged glands  ENDOCRINE: No heat or cold intolerance; No hair loss  MUSCULOSKELETAL: No joint pain or swelling; No muscle, back, or extremity pain  PSYCHIATRIC: No depression, anxiety, mood swings, or difficulty sleeping  HEME/LYMPH: No easy bruising, or bleeding gums  ALLERY AND IMMUNOLOGIC: No hives or eczema    HOME MEDICATIONS:  Home Medications:  amLODIPine 5 mg oral tablet: 1 tab(s) orally once a day (22 Aug 2020 09:50)  [place] Thyroid 15 mg oral tablet: 1 tab(s) orally once a day (22 Aug 2020 09:50)  aspirin 81 mg oral tablet:  (22 Aug 2020 09:50)  glipzide: 2.5 milligram(s)  once a day (22 Aug 2020 09:50)  Metoprolol Tartrate 50 mg oral tablet: 1 tab(s) orally 2 times a day (22 Aug 2020 09:50)  pioglitazone 15 mg oral tablet: 1 tab(s) orally once a day (22 Aug 2020 09:50)  ramipril 10 mg oral capsule: 1  orally once a day (22 Aug 2020 09:50)  simvastatin 20 mg oral tablet: 1 tab(s) orally once a day (at bedtime) (22 Aug 2020 09:50)  Vitamin D3 1000 intl units oral capsule: 1 cap(s) orally once a day (22 Aug 2020 09:50)      MEDICATIONS:  Antibiotics: Vanco given in ER x1    Neuro: none    Anticoagulation: on aspirin     Vital Signs Last 24 Hrs  T(C): 36.8 (22 Aug 2020 19:35), Max: 36.8 (22 Aug 2020 19:35)  T(F): 98.2 (22 Aug 2020 19:35), Max: 98.2 (22 Aug 2020 19:35)  HR: 57 (22 Aug 2020 19:35) (54 - 65)  BP: 132/80 (22 Aug 2020 19:35) (132/80 - 157/65)  BP(mean): 96 (22 Aug 2020 19:35) (78 - 96)  RR: 18 (22 Aug 2020 19:35) (14 - 20)  SpO2: 97% (22 Aug 2020 19:35) (96% - 100%)      PHYSICAL EXAM:  GENERAL: NAD, well-groomed, well-developed  HEAD: Bruise to face normocephalic    EYES: Conjunctiva and sclera clear; corneal reflex intact  ENMT: No tonsillar erythema, exudates, or enlargement; moist mucous membranes, good dentition, no lesions  NECK: Supple, no JVD, dormal thyroid  DAE COMA SCORE: E-4 V-5 M-6 =15       E: 4= opens eyes spontaneously 3= to voice 2= to noxious 1= no opening       V: 5= oriented 4= confused 3= inappropriate words 2= incomprehensible sounds 1= nonverbal 1T= intubated       M: 6= follows commands 5= localizes 4= withdraws 3= flexor posturing 2= extensor posturing 1= no movement  MENTAL STATUS: AAO x3; PERRL, EOMI, face symmetrical, tongue midline. B/L UE 5/5 B/L LE 5/5 no drift no ataxia   SENSATION: grossly intact to light touch all extremities  COORDINATION: no upper extremity dysmetria      CHEST/LUNG: Clear to auscultation bilaterally; no rales, rhonchi, wheezing, or rubs  HEART: +S1/+S2; Regular rate and rhythm; no murmurs, rubs, or gallops  ABDOMEN: Soft, nontender, nondistended; bowel sounds present all four quadrants  EXTREMITIES:  2+ peripheral pulses, no clubbing, cyanosis, or edema  LYMPH: No lymphadenopathy noted  SKIN: Warm, dry; no rashes or lesions    LABS:                        12.2   28.42 )-----------( 264      ( 22 Aug 2020 11:18 )             37.5     08-    132<L>  |  101  |  30<H>  ----------------------------<  307<H>  4.8   |  24  |  1.16    Ca    8.5      22 Aug 2020 11:18    TPro  6.4  /  Alb  3.2<L>  /  TBili  0.6  /  DBili  x   /  AST  26  /  ALT  25  /  AlkPhos  53  08-22    PT/INR - ( 22 Aug 2020 13:21 )   PT: 13.2 sec;   INR: 1.10 ratio         PTT - ( 22 Aug 2020 13:21 )  PTT:22.9 sec  Urinalysis Basic - ( 22 Aug 2020 11:18 )    Color: Yellow / Appearance: Clear / S.015 / pH: x  Gluc: x / Ketone: Negative  / Bili: Negative / Urobili: Negative mg/dL   Blood: x / Protein: 30 mg/dL / Nitrite: Negative   Leuk Esterase: Negative / RBC: 0-2 /HPF / WBC 0-2   Sq Epi: x / Non Sq Epi: Few / Bacteria: Few      < from: CT Abdomen and Pelvis w/ IV Cont (20 @ 12:36) >    IMPRESSION:  No pulmonary embolism.  Moderate size acute hematoma in the anterior pelvis. No associated fracture identified.  2.3 cm soft tissue mass involving the L5 vertebral body and extending into the spinal canal. MRI recommended to further evaluate.      Findings were discussed with Dr. MARIANNE TENORIO [phone] 2020 12:58 PM by Dr. Jones with read back confirmation.      Rad from 2011 reviewed CT A/P shows a mass approximately 2.2x2.1 cm involving the L5 vertebral body.     CAPRINI SCORE [CLOT]:  Patient has an estimated Caprini score of greater than 5.  However, the patient's unique clinical situation will be addressed in an individual manner to determine appropriate anticoagulation treatment, if any.
86 y/o female with a PMHx of A-fib no longer on Coumadin, DM, Uterus carcinoma, HTN, HLD, Arthritis, Hypothyroidism, Constipation, Herniated intervertebral disc , hip surgery, h/o traumatic subarachnoid hemorrhage with wrist fx in 2019 secondary to fall, unspecified tachy arrhythmia, FHx of arthritis and carcinoma, presents to the ED transferred from [place] ED, s/o received by ER physician, plan is to have pt to be evaluated by trauma and admitted. Pt went to [place] ED this morning c/o diarrhea, lightheadedness, generalized weakness, near syncope x today. Pt missed a step and fell onto R side this morning. c/o R sided chest discomfort. Pt reports CT at [place] showed pelvic hematoma and has an elevated WBC. On aspirin.      ICU Vital Signs Last 24 Hrs  T(C): 36.4 (22 Aug 2020 14:25), Max: 36.6 (22 Aug 2020 09:46)  T(F): 97.6 (22 Aug 2020 14:25), Max: 97.8 (22 Aug 2020 09:46)  HR: 65 (22 Aug 2020 16:41) (54 - 65)  BP: 135/54 (22 Aug 2020 16:41) (135/54 - 157/65)  BP(mean): 78 (22 Aug 2020 16:41) (78 - 81)  ABP: --  ABP(mean): --  RR: 20 (22 Aug 2020 16:41) (14 - 20)  SpO2: 99% (22 Aug 2020 16:41) (96% - 100%)    · CONSTITUTIONAL: Well appearing, awake, alert, oriented to person, place, time/situation and in no apparent distress.	  · ENMT: Airway patent, Nasal mucosa clear. Mouth with normal mucosa. Throat has no vesicles, no oropharyngeal exudates and uvula is midline.	  · EYES: Clear bilaterally, pupils equal, round and reactive to light. EOMI.	  · CARDIAC: Normal rate, regular rhythm.  Heart sounds S1, S2.  No murmurs, rubs or gallops.	  · RESPIRATORY: Breath sounds clear and equal bilaterally.	  · GASTROINTESTINAL: Abdomen soft, ,non distended, non tender.	  · MUSCULOSKELETAL: Spine appears normal, range of motion is not limited, no muscle or joint tenderness	  · NEUROLOGICAL: Alert and oriented, no focal deficits, no motor or sensory deficits. CN 2-12 intact. GCS 15.	  · SKIN: Skin warm, dry and intact. No evidence of rash. +2 areas of contusion L forearm/wrist on the palmar aspect of wrist.	                            12.2   28.42 )-----------( 264      ( 22 Aug 2020 11:18 )             37.5   08-22    132<L>  |  101  |  30<H>  ----------------------------<  307<H>  4.8   |  24  |  1.16    Ca    8.5      22 Aug 2020 11:18    TPro  6.4  /  Alb  3.2<L>  /  TBili  0.6  /  DBili  x   /  AST  26  /  ALT  25  /  AlkPhos  53  08-22      PT/INR - ( 22 Aug 2020 13:21 )   PT: 13.2 sec;   INR: 1.10 ratio         PTT - ( 22 Aug 2020 13:21 )  PTT:22.9 sec    Imaging:      EXAM:  CT ABDOMEN AND PELVIS IC                          EXAM:  CT ANGIO CHEST (W)AW IC                                  PROCEDURE DATE:  08/22/2020          INTERPRETATION:  CLINICAL INFORMATION: Upper back and left shoulder pain, abdominal pain, diarrhea    COMPARISON: None.    PROCEDURE:  CT Angiography of the Chest was performed followed by portal venous phase imaging of the Abdomen and Pelvis.  IV Contrast: 90 ml of Omnipaque 350 was injected intravenously. 10 ml were discarded.  Oral contrast: None.  Sagittal and coronal reformats were performed as well as 3D (MIP) reconstructions.    FINDINGS:  CHEST:  LUNGS AND LARGE AIRWAYS: Patent central airways. No pulmonary nodules.  PLEURA: No pleural effusion.  VESSELS: Excellent pulmonary arterial opacification. No evidence of embolism. Normal caliber aorta. Extensive coronary artery calcification.  HEART: Heart size is normal. No pericardial effusion.  MEDIASTINUM AND BLANCHE: No lymphadenopathy.  CHEST WALL AND LOWER NECK: Within normal limits.    ABDOMEN AND PELVIS:  LIVER: Within normal limits.  BILE DUCTS: Mild biliary ductal dilatation, most likely due to postcholecystectomy state.  GALLBLADDER: Cholecystectomy.  SPLEEN: Within normal limits.  PANCREAS: Mild pancreatic ductal dilatation, measuring up to 5 mm. The duct can be followed to the ampulla, without evidence of duct cut off sign or mass.  ADRENALS: Within normal limits.  KIDNEYS/URETERS: Bilateral renal cysts. 7 mm hypodense nodule in the posterior aspect of the left kidney, indeterminate. Additional subcentimeter foci in both kidneys are too small to characterize. No hydronephrosis, stone or mass.    BLADDER: Within normal limits.  REPRODUCTIVE ORGANS: Hysterectomy.    BOWEL: No bowel obstruction. Appendix not definitively identified. Severe colonic diverticulosis, without diverticulitis.  PERITONEUM: No ascites.  VESSELS: Atherosclerotic changes.  RETROPERITONEUM/LYMPH NODES: No lymphadenopathy.  ABDOMINAL WALL: Within normal limits.  BONES: 2.3 cm soft tissue mass involving the posterior aspect of the L5 vertebral body and extending into the spinal canal, with smooth, predominantly sclerotic borders of the bones surrounding the mass, suggestive of a chronic process. No acute fracture. L2 vertebral body hemangioma.    IMPRESSION:  No pulmonary embolism.  Moderate size acute hematoma in the anterior pelvis. No associated fracture identified.  2.3 cm soft tissue mass involving the L5 vertebral body and extending into the spinal canal. MRI recommended to further evaluate.

## 2020-08-22 NOTE — ED PROVIDER NOTE - EYES, MLM
Clear bilaterally, pupils equal, round and reactive to light. EOMI. Clear bilaterally, pupils equal, round and reactive to light. EOMI. Visual fields intact x 4 quadrants.

## 2020-08-22 NOTE — ED PROVIDER NOTE - OBJECTIVE STATEMENT
pt bib ems for diarrhea, near syncope this am. pt relates had upper back pain radiating to left shoulder and ant neck yesterday. pt missed step and fell onto right side this am prior to diarrhea and near syncope. + lower abd pain after diarrhea. no fevers, chills, cp, sob, cough, n/v, focal weakness or numbness, dysuria.  pmd - bhupendra chambers

## 2020-08-22 NOTE — ED PROVIDER NOTE - CRITICAL CARE PROVIDED
telephone consultation with the patient's family/additional history taking/direct patient care (not related to procedure)/interpretation of diagnostic studies/consultation with other physicians/documentation

## 2020-08-22 NOTE — ED PROVIDER NOTE - MUSCULOSKELETAL, MLM
Spine appears normal, range of motion is not limited, no muscle or joint tenderness Spine appears normal, range of motion is not limited, no muscle or joint tenderness. 5/5 strength on flexion and extension of all limbs. No nuchal rigidity. No saddle anesthesia.

## 2020-08-22 NOTE — ED PROVIDER NOTE - CHPI ED SYMPTOMS POS
DIZZINESS/+R sided chest discomfort, +lightheadedness, +generalized weakness, +near syncope, +diarrhea

## 2020-08-22 NOTE — ED ADULT TRIAGE NOTE - HEIGHT IN FEET
Dear Team Member,    Per your recent telephone screening with Employee Health:    You will need to schedule a lab appointment to be tested for COVID-19.  Your lab appointment was scheduled during today's call.  You are to remain off work and out of public places except to seek medical care, and complete the symptom tracker daily on [employer].       Off work start date: 05/08/20  Off work end date: 05/15/20  Quarantine start date: 05/08/20  Quarantine end date: 05/15/20    Notify employee health if you have a marked increase or decrease in symptoms between check ins.    For [place] Employees, please follow this link to view a copy of the consent form: [email or web address]    For [place] Employees, please follow this link to view a copy of the consent form: [email or web address]    Thank you,    Employee Health    Cc: Manager   5

## 2020-08-22 NOTE — ED PROVIDER NOTE - OBJECTIVE STATEMENT
88 y/o female with a PMHx of A-fib no longer on Coumadin, DM, Uterus carcinoma, HTN, HLD, Arthritis, Hypothyroidism, Constipation, Herniated intervertebral disc , hip surgery, h/o traumatic subarachnoid hemorrhage with wrist fx in 2019 secondary to fall, unspecified tachy arrhythmia, FHx of arthritis and carcinoma, presents to the ED transferred from [place] ED, s/o received by ER physician, plan is to have pt to be evaluated by trauma and admitted. Pt went to [place] ED this morning c/o diarrhea, lightheadedness, generalized weakness, near syncope x today. PPt missed a step and fell onto R side this morning. c/o R sided chest discomfort. Pt reports CT at [place] showed pelvic hematoma and has an elevated WBC. On aspirin.    PMD: Dr. Walker.   Cardio: Dr. Lackey 88 y/o female with a PMHx of A-fib no longer on Coumadin, DM, Uterus carcinoma, HTN, HLD, Arthritis, Hypothyroidism, Constipation, Herniated intervertebral disc , hip surgery, h/o traumatic subarachnoid hemorrhage with wrist fx in 2019 secondary to fall, unspecified tachy arrhythmia, FHx of arthritis and carcinoma, presents to the ED transferred from [place] ED, s/o received by ER physician, plan is to have pt to be evaluated by trauma and admitted. Pt went to [place] ED this morning c/o diarrhea, lightheadedness, generalized weakness, near syncope x today. Pt missed a step and fell onto R side this morning. c/o R sided chest discomfort. Pt reports CT at [place] showed pelvic hematoma and has an elevated WBC. On aspirin.    PMD: Dr. Walker.   Cardio: Dr. Lackey 86 y/o female with PMHx of A-fib no longer on Coumadin, DM, Uterus carcinoma, HTN, HLD, Arthritis, Hypothyroidism, Constipation, Herniated intervertebral disc , hip surgery, h/o traumatic subarachnoid hemorrhage with wrist fx in 2019 secondary to fall, unspecified tachy arrhythmia, FHx of arthritis and carcinoma, presents to the ED transferred from [place] ED, s/o received by ER physician, plan is to have pt to be evaluated by trauma and admitted for syncope and fall. Pt went to [place] ED this morning c/o diarrhea, lightheadedness, generalized weakness, near syncope x today. Pt missed a step and fell onto R side this morning. c/o R sided chest discomfort. Pt reports CT at [place] showed pelvic hematoma and has an elevated WBC. Mild chronic baseline incontinence of urine, no urinary retnetion, no saddle anesthesia, no paresthesias, no known recent surgeries or exotic travel.  On aspirin.    PMD: Dr. Walker.   Cardio: Dr. Lackey

## 2020-08-22 NOTE — ED PROVIDER NOTE - CARE PLAN
Principal Discharge DX:	Syncope, unspecified syncope type  Goal:	syncope, lightheadedness, elevated WBC, hematoma of the abdomen, sent form [place], Trauma saw and cleared patient for medical eval, L5 wrapping mass, NGS consult, MRI  Secondary Diagnosis:	Abdominal hematoma Principal Discharge DX:	Syncope, unspecified syncope type  Goal:	syncope, lightheadedness, elevated WBC, hematoma of the abdomen, sent form [place], Trauma saw and cleared patient for medical eval, L5 wrapping mass, NGS consult, MRI  Assessment and plan of treatment:	Problem: Syncope, fall: Plan: CTs done, labs, rule out cardiogenic syncope  Problem: Hx of cancer, mass at lumbar spine. Plan: MRI, NSG cs, admission  Problem: Pelvic hematoma: Plan: Unclear if chronic, new, or incidental, if traumatic vs. related to hx of carcinoma, further inpatient evaluation is needed. Surgery consulted, surgery does not recommend immediate intervention.  Problem: Frail old lady, with no immediate social support at home: Not safe to go home.  Secondary Diagnosis:	Abdominal hematoma

## 2020-08-22 NOTE — ED PROVIDER NOTE - NS_ ATTENDINGSCRIBEDETAILS _ED_A_ED_FT
I Daniel Gomez MD saw and examined the patient. Scribe documented for me and under my supervision. I have modified the scribe's documentation where necessary to reflect my history, physical exam and other relevant documentations pertinent to the care of the patient.

## 2020-08-22 NOTE — ED PROVIDER NOTE - GASTROINTESTINAL, MLM
Abdomen soft, +mild tenderness suprapubic region Abdomen soft, +mild tenderness suprapubic region. BS+ in 4 quadrants.

## 2020-08-22 NOTE — CONSULT NOTE ADULT - ASSESSMENT
86 y/o female with a PMHx of A-fib no longer on Coumadin, DM, Uterus carcinoma, HTN, HLD, Arthritis, Hypothyroidism, Constipation, Herniated intervertebral disc , hip surgery, h/o traumatic subarachnoid hemorrhage came in to the ED after she felt at home and developed stable pelvic hematoma.    Plan:    - Get pan cultures to r/o source of infection the patient might have.  - Get ID consult  - Get GI evaluation for her diarrhea.  - Continue IV fluids and give IV boluses as needed.  - The patient has stable pelvic hematoma with no pelvic fracture .  - Continue medical management.  - No surgical intervention needed from trauma surgery standpoint 88 y/o female with a PMHx of A-fib no longer on Coumadin, DM, Uterus carcinoma, HTN, HLD, Arthritis, Hypothyroidism, Constipation, Herniated intervertebral disc , hip surgery, h/o traumatic subarachnoid hemorrhage came in to the ED after she felt at home and developed stable pelvic hematoma.    Plan:    - Get pan cultures to r/o source of infection the patient might have.  - Get ID consult  - Get GI evaluation for her diarrhea.  - Continue IV fluids and give IV boluses as needed.  - The patient has stable pelvic hematoma with no pelvic fracture .  - Continue medical management.  - No surgical intervention needed from trauma surgery standpoint   - Surgery team will follow along.     The plan was discussed with Dr. Walker

## 2020-08-23 LAB
A1C WITH ESTIMATED AVERAGE GLUCOSE RESULT: 6.2 % — HIGH (ref 4–5.6)
ANION GAP SERPL CALC-SCNC: 6 MMOL/L — SIGNIFICANT CHANGE UP (ref 5–17)
BASOPHILS # BLD AUTO: 0.07 K/UL — SIGNIFICANT CHANGE UP (ref 0–0.2)
BASOPHILS NFR BLD AUTO: 0.6 % — SIGNIFICANT CHANGE UP (ref 0–2)
BUN SERPL-MCNC: 15 MG/DL — SIGNIFICANT CHANGE UP (ref 7–23)
CALCIUM SERPL-MCNC: 8 MG/DL — LOW (ref 8.5–10.1)
CHLORIDE SERPL-SCNC: 111 MMOL/L — HIGH (ref 96–108)
CO2 SERPL-SCNC: 23 MMOL/L — SIGNIFICANT CHANGE UP (ref 22–31)
CREAT SERPL-MCNC: 0.81 MG/DL — SIGNIFICANT CHANGE UP (ref 0.5–1.3)
CULTURE RESULTS: NO GROWTH — SIGNIFICANT CHANGE UP
EOSINOPHIL # BLD AUTO: 0.08 K/UL — SIGNIFICANT CHANGE UP (ref 0–0.5)
EOSINOPHIL NFR BLD AUTO: 0.7 % — SIGNIFICANT CHANGE UP (ref 0–6)
ESTIMATED AVERAGE GLUCOSE: 131 MG/DL — HIGH (ref 68–114)
GLUCOSE SERPL-MCNC: 107 MG/DL — HIGH (ref 70–99)
HCT VFR BLD CALC: 27.9 % — LOW (ref 34.5–45)
HCT VFR BLD CALC: 28.9 % — LOW (ref 34.5–45)
HGB BLD-MCNC: 9.2 G/DL — LOW (ref 11.5–15.5)
HGB BLD-MCNC: 9.6 G/DL — LOW (ref 11.5–15.5)
IMM GRANULOCYTES NFR BLD AUTO: 0.8 % — SIGNIFICANT CHANGE UP (ref 0–1.5)
LYMPHOCYTES # BLD AUTO: 1.61 K/UL — SIGNIFICANT CHANGE UP (ref 1–3.3)
LYMPHOCYTES # BLD AUTO: 13.1 % — SIGNIFICANT CHANGE UP (ref 13–44)
MCHC RBC-ENTMCNC: 31.4 PG — SIGNIFICANT CHANGE UP (ref 27–34)
MCHC RBC-ENTMCNC: 31.6 PG — SIGNIFICANT CHANGE UP (ref 27–34)
MCHC RBC-ENTMCNC: 33 GM/DL — SIGNIFICANT CHANGE UP (ref 32–36)
MCHC RBC-ENTMCNC: 33.2 GM/DL — SIGNIFICANT CHANGE UP (ref 32–36)
MCV RBC AUTO: 94.4 FL — SIGNIFICANT CHANGE UP (ref 80–100)
MCV RBC AUTO: 95.9 FL — SIGNIFICANT CHANGE UP (ref 80–100)
MONOCYTES # BLD AUTO: 0.71 K/UL — SIGNIFICANT CHANGE UP (ref 0–0.9)
MONOCYTES NFR BLD AUTO: 5.8 % — SIGNIFICANT CHANGE UP (ref 2–14)
NEUTROPHILS # BLD AUTO: 9.73 K/UL — HIGH (ref 1.8–7.4)
NEUTROPHILS NFR BLD AUTO: 79 % — HIGH (ref 43–77)
PLATELET # BLD AUTO: 215 K/UL — SIGNIFICANT CHANGE UP (ref 150–400)
PLATELET # BLD AUTO: 223 K/UL — SIGNIFICANT CHANGE UP (ref 150–400)
POTASSIUM SERPL-MCNC: 3.7 MMOL/L — SIGNIFICANT CHANGE UP (ref 3.5–5.3)
POTASSIUM SERPL-SCNC: 3.7 MMOL/L — SIGNIFICANT CHANGE UP (ref 3.5–5.3)
RBC # BLD: 2.91 M/UL — LOW (ref 3.8–5.2)
RBC # BLD: 3.06 M/UL — LOW (ref 3.8–5.2)
RBC # FLD: 13.1 % — SIGNIFICANT CHANGE UP (ref 10.3–14.5)
RBC # FLD: 13.2 % — SIGNIFICANT CHANGE UP (ref 10.3–14.5)
SARS-COV-2 IGG SERPL QL IA: NEGATIVE — SIGNIFICANT CHANGE UP
SARS-COV-2 IGM SERPL IA-ACNC: 0.08 INDEX — SIGNIFICANT CHANGE UP
SODIUM SERPL-SCNC: 140 MMOL/L — SIGNIFICANT CHANGE UP (ref 135–145)
SPECIMEN SOURCE: SIGNIFICANT CHANGE UP
WBC # BLD: 12.3 K/UL — HIGH (ref 3.8–10.5)
WBC # BLD: 12.43 K/UL — HIGH (ref 3.8–10.5)
WBC # FLD AUTO: 12.3 K/UL — HIGH (ref 3.8–10.5)
WBC # FLD AUTO: 12.43 K/UL — HIGH (ref 3.8–10.5)

## 2020-08-23 PROCEDURE — 99233 SBSQ HOSP IP/OBS HIGH 50: CPT

## 2020-08-23 PROCEDURE — 99231 SBSQ HOSP IP/OBS SF/LOW 25: CPT

## 2020-08-23 RX ADMIN — SIMVASTATIN 20 MILLIGRAM(S): 20 TABLET, FILM COATED ORAL at 21:25

## 2020-08-23 RX ADMIN — AMLODIPINE BESYLATE 5 MILLIGRAM(S): 2.5 TABLET ORAL at 09:21

## 2020-08-23 RX ADMIN — Medication 50 MILLIGRAM(S): at 09:21

## 2020-08-23 RX ADMIN — LISINOPRIL 40 MILLIGRAM(S): 2.5 TABLET ORAL at 09:21

## 2020-08-23 RX ADMIN — Medication 50 MILLIGRAM(S): at 21:31

## 2020-08-23 RX ADMIN — Medication 25 MICROGRAM(S): at 05:39

## 2020-08-23 NOTE — PROGRESS NOTE ADULT - ASSESSMENT
87 year old female with leukocytosis, diarrhea and near syncope found to have abdominal hematoma and L5 vertebral body lesion with a PMHx of A-fib no longer on Coumadin, DM, Uterine carcinoma, HTN, HLD, Arthritis, Hypothyroidism, Constipation, Herniated intervertebral disc , hip surgery, h/o traumatic subarachnoid hemorrhage came in to the ED after she felt at home and developed stable pelvic hematoma.    - No acute neurosurgical intervention  - Pt is neurologically nonfocal  - Images compared to films from 2011, lesion at L5 vertebral body at that time  - Lesion not contributing in any way to pt's current condition  - Rec medical mgmt for current symptoms  - May follow up in the office as an outpatient, non-urgently  - Will sign off for now, reconsult PRN    discussed above with Dr. Patel

## 2020-08-23 NOTE — PROGRESS NOTE ADULT - ASSESSMENT
87-year-old female with PMHx significant for uterine carcinoma diagnosed back in 1976 s/p radiotherapy, hypothyroidism status post radioactive iodine, atrial fibrillation off anticoagulation due to fall risk with Hx of SAH 1 year ago status now presents with initially mechanical fall followed by near syncopal episode.     1. Fall mechanical - + moderate size anterior pelvis hematoma without Fx with anemia of acute blood loss - series HH, transfuse as needed, no intervention.    2. L5-S1 stable mass without change from past - needs further evaluation as outpt, unlikely malignant    3. Near-syncope - likely due to formation of hematoma in the settings of acute hemorrhage    4. Afib on BBL with asymptomatic bradycardia and off AC due to Hx of falls and SAH    5. Hypothyroidism - levothyroxine    6. VTE proph - SCD    7. HTN - cont current meds    8. HLD - statin    9. DMII - HISS 87-year-old female with PMHx significant for uterine carcinoma diagnosed back in 1976 s/p radiotherapy, hypothyroidism status post radioactive iodine, atrial fibrillation off anticoagulation due to fall risk with Hx of SAH 1 year ago status now presents with initially mechanical fall followed by near syncopal episode.     1. Fall mechanical - + moderate size anterior pelvis hematoma without Fx with anemia of acute blood loss - series HH, transfuse as needed, no intervention.    2. L5-S1 stable mass without change from past - needs further evaluation as outpt, unlikely malignant    3. Near-syncope - likely due to formation of hematoma in the settings of acute hemorrhage    4. Afib on BBL with asymptomatic bradycardia and off AC due to Hx of falls and SAH    5. Hypothyroidism - levothyroxine    6. VTE proph - SCD    7. HTN - cont current meds    8. HLD - statin    9. DMII - HISS    10. Reactive leukocytosis - no evidence of infection

## 2020-08-23 NOTE — PROGRESS NOTE ADULT - SUBJECTIVE AND OBJECTIVE BOX
Patient is a 87y old  Female who presents with a chief complaint of s/p Fall (23 Aug 2020 13:42)      HPI:  Patient is a 87y old  Female who presents with a chief complaint of near syncopal episode and fall earlier today and transfer from [place] for trauma evaluation. Patient denies urinary incontinence, numbness or weakness. States she was able to garden yesterday and whenever she has a little pain she takes tylenol. Able to walk without assistance.  Pt went to [place] ED this morning c/o diarrhea, lightheadedness, generalized weakness, near syncope x today. Pt missed a step and fell onto R side this morning. c/o R sided chest discomfort. Pt reports CT at [place] showed pelvic hematoma and has an elevated WBC. On aspirin.  Denies any recent malaise or feeling ill other than today.     8/23 - Films / chart reviewed with Dr Patel      amLODIPine   Tablet 5 milliGRAM(s) Oral daily  dextrose 40% Gel 15 Gram(s) Oral once PRN  dextrose 5%. 1000 milliLiter(s) IV Continuous <Continuous>  dextrose 50% Injectable 12.5 Gram(s) IV Push once  dextrose 50% Injectable 25 Gram(s) IV Push once  dextrose 50% Injectable 25 Gram(s) IV Push once  glucagon  Injectable 1 milliGRAM(s) IntraMuscular once PRN  insulin lispro (HumaLOG) corrective regimen sliding scale   SubCutaneous three times a day before meals  levothyroxine 25 MICROGram(s) Oral daily  lisinopril 40 milliGRAM(s) Oral daily  metoprolol tartrate 50 milliGRAM(s) Oral two times a day  simvastatin 20 milliGRAM(s) Oral at bedtime      Vital Signs Last 24 Hrs  T(C): 36.3 (23 Aug 2020 07:37), Max: 37 (22 Aug 2020 23:01)  T(F): 97.4 (23 Aug 2020 07:37), Max: 98.6 (22 Aug 2020 23:01)  HR: 55 (23 Aug 2020 14:00) (49 - 75)  BP: 133/67 (23 Aug 2020 14:00) (105/61 - 169/60)  BP(mean): 83 (23 Aug 2020 14:00) (54 - 96)  RR: 15 (23 Aug 2020 14:00) (13 - 22)  SpO2: 97% (23 Aug 2020 14:00) (94% - 100%)                          9.6    12.43 )-----------( 223      ( 23 Aug 2020 12:00 )             28.9       08-23    140  |  111<H>  |  15  ----------------------------<  107<H>  3.7   |  23  |  0.81    Ca    8.0<L>      23 Aug 2020 06:55    TPro  6.4  /  Alb  3.2<L>  /  TBili  0.6  /  DBili  x   /  AST  26  /  ALT  25  /  AlkPhos  53  08-22      PT/INR - ( 22 Aug 2020 13:21 )   PT: 13.2 sec;   INR: 1.10 ratio         PTT - ( 22 Aug 2020 13:21 )  PTT:22.9 sec    Radiology:  MR Lumbar Spine w/wo IV Cont (08.22.20 @ 20:36) >  1. L5-S1 level, a large 2.6 x 3.2 x 2.6 cm left lateral epidural mass centered  within the left foramen, obliterating the left lateral recess and the left  foramen, mild-to-moderate left spinal canal narrowing. Dfferential diagnosis  includes ameningioma, schwannoma, among other entities.    2. Multiple renal cysts bilaterally, measuring up to 6.9 cm, not completely  evaluated, consider an ultrasound correlation or CT correlation for more  detailed evaluation.

## 2020-08-23 NOTE — PROGRESS NOTE ADULT - SUBJECTIVE AND OBJECTIVE BOX
The patient was seen and examined today bedside, the patient denied any pain, nausea or vomiting. WBCs is tredning done also H&H is trending down from 12.2 to 9.2. No other acute events were reported from the nursing staff.     Vital Signs Last 24 Hrs  T(C): 36.3 (23 Aug 2020 07:37), Max: 37 (22 Aug 2020 23:01)  T(F): 97.4 (23 Aug 2020 07:37), Max: 98.6 (22 Aug 2020 23:01)  HR: 64 (23 Aug 2020 07:00) (49 - 65)  BP: 150/39 (23 Aug 2020 07:00) (109/35 - 164/45)  BP(mean): 69 (23 Aug 2020 07:00) (54 - 96)  RR: 15 (23 Aug 2020 07:00) (13 - 20)  SpO2: 96% (23 Aug 2020 07:00) (94% - 100%)    · CONSTITUTIONAL: Well appearing, awake, alert, oriented to person, place, time/situation and in no apparent distress.	  · ENMT: Airway patent, Nasal mucosa clear. Mouth with normal mucosa. Throat has no vesicles, no oropharyngeal exudates and uvula is midline.	  · EYES: Clear bilaterally, pupils equal, round and reactive to light. EOMI.	  · CARDIAC: Normal rate, regular rhythm.  Heart sounds S1, S2.  No murmurs, rubs or gallops.	  · RESPIRATORY: Breath sounds clear and equal bilaterally.	  · GASTROINTESTINAL: Abdomen soft, ,non distended, non tender.	  · MUSCULOSKELETAL: Spine appears normal, range of motion is not limited, no muscle or joint tenderness	  · NEUROLOGICAL: Alert and oriented, no focal deficits, no motor or sensory deficits. CN 2-12 intact. GCS 15.	  · SKIN: Skin warm, dry and intact. No evidence of rash. +2 areas of contusion L forearm/wrist on the palmar aspect of wrist.	                          9.2    12.30 )-----------( 215      ( 23 Aug 2020 06:55 )             27.9   08-23    140  |  111<H>  |  15  ----------------------------<  107<H>  3.7   |  23  |  0.81    Ca    8.0<L>      23 Aug 2020 06:55    TPro  6.4  /  Alb  3.2<L>  /  TBili  0.6  /  DBili  x   /  AST  26  /  ALT  25  /  AlkPhos  53  08-22    PT/INR - ( 22 Aug 2020 13:21 )   PT: 13.2 sec;   INR: 1.10 ratio         PTT - ( 22 Aug 2020 13:21 )  PTT:22.9 sec

## 2020-08-23 NOTE — PROGRESS NOTE ADULT - ASSESSMENT
88 y/o female with a PMHx of A-fib no longer on Coumadin, DM, Uterus carcinoma, HTN, HLD, Arthritis, Hypothyroidism, Constipation, Herniated intervertebral disc , hip surgery, h/o traumatic subarachnoid hemorrhage came in to the ED after she felt at home and developed stable pelvic hematoma.    Plan:    - Get pan cultures to r/o source of infection.  - Get ID consult  - Get GI evaluation.  - Continue IV fluids and give IV boluses as needed.  - The patient has stable pelvic hematoma with no pelvic fracture .  - Continue medical management.  - Please get CBC at noon to check H&H.  - Surgery team will follow along.   - No surgical intervention needed from trauma surgery standpoint     The plan was discussed with Dr. Walker 88 y/o female with a PMHx of A-fib no longer on Coumadin, DM, Uterus carcinoma, HTN, HLD, Arthritis, Hypothyroidism, Constipation, Herniated intervertebral disc , hip surgery, h/o traumatic subarachnoid hemorrhage came in to the ED after she felt at home and developed stable pelvic hematoma.    Plan:    - Get pan cultures to r/o source of infection.  - Get ID consult  - Get GI evaluation.  - Continue IV fluids and give IV boluses as needed.  - The patient has stable pelvic hematoma with no pelvic fracture .  - Please consult neurosurgery for the lumbar spin mass.   - Continue medical management.  - H&H came stable.  - No surgical intervention needed from trauma surgery standpoint .  - Trauma surgery team will sign off this patient, please reconsult again if needed.    The plan was discussed with Dr. Walker

## 2020-08-23 NOTE — PROGRESS NOTE ADULT - SUBJECTIVE AND OBJECTIVE BOX
CC: s/p Fall (23 Aug 2020 09:54)    HPI: 87-year-old female with a past medical history significant for uterine carcinoma diagnosed back in  whereby she was treated with radiotherapy as well as a history of hypothyroidism status post radioactive iodine the patient also has a history of atrial fibrillation presently on full-dose anticoagulation due to fall risk who presented approximately 1 year ago status post fall developed a subarachnoid hemorrhage and now presents after recent episode of near-syncope and mechanical fall.  The patient initially was picked up by EMS and was seen and evaluated at [facility].  While there the patient underwent a CT abdomen and pelvis which suggested the presence of a moderate-size acute hematoma in the anterior pelvis no associated fracture as well as the presence of a 2.3 cm mass involving L5 vertebral body extending into the spinal canal.  The the patient underwent an MRI here at [facility] which was compared to the prior imaging dating back to  whereby the lesion in the L5 vertebral body appeared to be chronic lesion and correlates to the abnormality dating back to .  The patient denies any focal weakness.  She notes the mechanism of fall earlier today was purely based on having had a missed step.  She denies any head trauma.  Also while there she underwent a CT head with no evidence of intracranial bleeding or fractures she also went a cervical spine CT with no traumatic injury noted.  Otherwise she does note that as of recently there have been some recent medication changes associated with her atrial fibrillation she was recently started on amiodarone approximately 4 days ago and subsequently developed dizziness and overall fatigue and malaise.  Denies any recent chest pain or palpitations upon arrival to side as the hospital EKG was consistent with sinus bradycardia.  At the time of today's evaluation patient denies any back pain generally just reports feeling significantly fatigued has a small area of periorbital ecchymosis along the right eye and has no additional complaints she denies any recent fevers chills and corona virus PCR testing was performed and she was noted to be negative. (22 Aug 2020 21:40)    INTERVAL HPI/OVERNIGHT EVENTS: anxious female in the bed without any complaints  Other ROS reviewed and neg     Vital Signs Last 24 Hrs  T(C): 36.3 (23 Aug 2020 07:37), Max: 37 (22 Aug 2020 23:01)  T(F): 97.4 (23 Aug 2020 07:37), Max: 98.6 (22 Aug 2020 23:01)  HR: 49 (23 Aug 2020 13:00) (49 - 75)  BP: 134/50 (23 Aug 2020 13:00) (105/61 - 169/60)  BP(mean): 71 (23 Aug 2020 13:00) (54 - 96)  RR: 20 (23 Aug 2020 13:00) (13 - 22)  SpO2: 95% (23 Aug 2020 13:00) (94% - 100%)  I&O's Detail    22 Aug 2020 07:01  -  23 Aug 2020 07:00  --------------------------------------------------------  IN:  Total IN: 0 mL    OUT:    Voided: 250 mL  Total OUT: 250 mL    Total NET: -250 mL          CARDIAC MARKERS ( 22 Aug 2020 11:18 )  .004 ng/mL / x     / x     / x     / x                                9.6    12.43 )-----------( 223      ( 23 Aug 2020 12:00 )             28.9     23 Aug 2020 06:55    140    |  111    |  15     ----------------------------<  107    3.7     |  23     |  0.81     Ca    8.0        23 Aug 2020 06:55    TPro  6.4    /  Alb  3.2    /  TBili  0.6    /  DBili  x      /  AST  26     /  ALT  25     /  AlkPhos  53     22 Aug 2020 11:18    PT/INR - ( 22 Aug 2020 13:21 )   PT: 13.2 sec;   INR: 1.10 ratio         PTT - ( 22 Aug 2020 13:21 )  PTT:22.9 sec  CAPILLARY BLOOD GLUCOSE      POCT Blood Glucose.: 156 mg/dL (23 Aug 2020 12:22)  POCT Blood Glucose.: 123 mg/dL (23 Aug 2020 09:12)    LIVER FUNCTIONS - ( 22 Aug 2020 11:18 )  Alb: 3.2 g/dL / Pro: 6.4 g/dL / ALK PHOS: 53 U/L / ALT: 25 U/L DA / AST: 26 U/L / GGT: x           Urinalysis Basic - ( 22 Aug 2020 11:18 )    Color: Yellow / Appearance: Clear / S.015 / pH: x  Gluc: x / Ketone: Negative  / Bili: Negative / Urobili: Negative mg/dL   Blood: x / Protein: 30 mg/dL / Nitrite: Negative   Leuk Esterase: Negative / RBC: 0-2 /HPF / WBC 0-2   Sq Epi: x / Non Sq Epi: Few / Bacteria: Few        MEDICATIONS  (STANDING):  amLODIPine   Tablet 5 milliGRAM(s) Oral daily  dextrose 5%. 1000 milliLiter(s) (50 mL/Hr) IV Continuous <Continuous>  dextrose 50% Injectable 12.5 Gram(s) IV Push once  dextrose 50% Injectable 25 Gram(s) IV Push once  dextrose 50% Injectable 25 Gram(s) IV Push once  insulin lispro (HumaLOG) corrective regimen sliding scale   SubCutaneous three times a day before meals  levothyroxine 25 MICROGram(s) Oral daily  lisinopril 40 milliGRAM(s) Oral daily  metoprolol tartrate 50 milliGRAM(s) Oral two times a day  simvastatin 20 milliGRAM(s) Oral at bedtime    MEDICATIONS  (PRN):  dextrose 40% Gel 15 Gram(s) Oral once PRN Blood Glucose LESS THAN 70 milliGRAM(s)/deciliter  glucagon  Injectable 1 milliGRAM(s) IntraMuscular once PRN Glucose LESS THAN 70 milligrams/deciliter      RADIOLOGY & ADDITIONAL TESTS: personally visualized    PHYSICAL EXAM:    General: elderly female in no acute distress  Eyes: PERRLA, EOMI; conjunctiva and sclera clear  Head: Normocephalic; atraumatic  ENMT: No nasal discharge; airway clear  Neck: Supple; non tender; no masses  Respiratory: No wheezes, rales or rhonchi  Cardiovascular: S1, S2 irreg  Gastrointestinal: Soft abd, NT, + BS  Genitourinary: No costovertebral angle tenderness  Extremities: No clubbing, cyanosis or edema  Vascular: Peripheral pulses palpable 2+ bilaterally  Neurological: Alert and oriented x4  Skin: Warm and dry.   Musculoskeletal: Normal tone, without deformities  Psychiatric: Cooperative and anxious

## 2020-08-23 NOTE — PATIENT PROFILE ADULT - NSPROPOAPRESSUREINJURY_GEN_A_NUR
74yo M with PMH significant for diverticulosis (s/p partial bowel resection and reanastomosis ~3 yrs ago), Burkitt's lymphoma HIV negative, admitted for C2 R-EPOCH day +2 (s/p Rituxan as outpt on 7/9)  BM bx not involved, CSF not involved    -monitor counts, transfuse PRN  -LP with chemo IT Mtx was scheduled for today -HOLD due to elevated Pt and aPTT, will redraw along with mixing studies and reassess  -monitor lytes, transfuse PRN  -monitor for fevers. Will add prophylaxis Bactrim DS Mon/Wed/Fri no 72yo M with PMH significant for diverticulosis (s/p partial bowel resection and reanastomosis ~3 yrs ago), Burkitt's lymphoma HIV negative, admitted for C2 R-EPOCH day +3 (s/p Rituxan as outpt on 7/9)  BM bx not involved, CSF not involved    -monitor counts, transfuse PRN  -LP with chemo IT Mtx done on 7/11 (repeat aPTT/PT normal) -f/u flow cytometry   -monitor lytes, transfuse PRN  -monitor for fevers. Cont prophylaxis Bactrim DS Mon/Wed/Fri  -d/c home after chemo on 7/14/19; Neulasta as outpt and f/u with Dr. Diaz

## 2020-08-24 ENCOUNTER — TRANSCRIPTION ENCOUNTER (OUTPATIENT)
Age: 85
End: 2020-08-24

## 2020-08-24 VITALS
OXYGEN SATURATION: 96 % | HEART RATE: 57 BPM | RESPIRATION RATE: 22 BRPM | DIASTOLIC BLOOD PRESSURE: 44 MMHG | SYSTOLIC BLOOD PRESSURE: 109 MMHG

## 2020-08-24 LAB
ANION GAP SERPL CALC-SCNC: 4 MMOL/L — LOW (ref 5–17)
BUN SERPL-MCNC: 20 MG/DL — SIGNIFICANT CHANGE UP (ref 7–23)
CALCIUM SERPL-MCNC: 8.4 MG/DL — LOW (ref 8.5–10.1)
CHLORIDE SERPL-SCNC: 107 MMOL/L — SIGNIFICANT CHANGE UP (ref 96–108)
CO2 SERPL-SCNC: 27 MMOL/L — SIGNIFICANT CHANGE UP (ref 22–31)
CREAT SERPL-MCNC: 0.88 MG/DL — SIGNIFICANT CHANGE UP (ref 0.5–1.3)
GLUCOSE SERPL-MCNC: 136 MG/DL — HIGH (ref 70–99)
HCT VFR BLD CALC: 28.8 % — LOW (ref 34.5–45)
HGB BLD-MCNC: 9.2 G/DL — LOW (ref 11.5–15.5)
MCHC RBC-ENTMCNC: 30.5 PG — SIGNIFICANT CHANGE UP (ref 27–34)
MCHC RBC-ENTMCNC: 31.9 GM/DL — LOW (ref 32–36)
MCV RBC AUTO: 95.4 FL — SIGNIFICANT CHANGE UP (ref 80–100)
PLATELET # BLD AUTO: 222 K/UL — SIGNIFICANT CHANGE UP (ref 150–400)
POTASSIUM SERPL-MCNC: 4.3 MMOL/L — SIGNIFICANT CHANGE UP (ref 3.5–5.3)
POTASSIUM SERPL-SCNC: 4.3 MMOL/L — SIGNIFICANT CHANGE UP (ref 3.5–5.3)
RBC # BLD: 3.02 M/UL — LOW (ref 3.8–5.2)
RBC # FLD: 13.2 % — SIGNIFICANT CHANGE UP (ref 10.3–14.5)
SODIUM SERPL-SCNC: 138 MMOL/L — SIGNIFICANT CHANGE UP (ref 135–145)
WBC # BLD: 10.8 K/UL — HIGH (ref 3.8–10.5)
WBC # FLD AUTO: 10.8 K/UL — HIGH (ref 3.8–10.5)

## 2020-08-24 PROCEDURE — 99239 HOSP IP/OBS DSCHRG MGMT >30: CPT

## 2020-08-24 RX ORDER — ASPIRIN/CALCIUM CARB/MAGNESIUM 324 MG
0 TABLET ORAL
Qty: 0 | Refills: 0 | DISCHARGE

## 2020-08-24 RX ADMIN — LISINOPRIL 40 MILLIGRAM(S): 2.5 TABLET ORAL at 10:11

## 2020-08-24 RX ADMIN — AMLODIPINE BESYLATE 5 MILLIGRAM(S): 2.5 TABLET ORAL at 10:11

## 2020-08-24 RX ADMIN — Medication 25 MICROGRAM(S): at 06:07

## 2020-08-24 RX ADMIN — Medication 2: at 12:17

## 2020-08-24 NOTE — CHART NOTE - NSCHARTNOTEFT_GEN_A_CORE
Upon Nutritional Assessment by the Registered Dietitian your patient was determined to meet criteria / has evidence of the following diagnosis/diagnoses:          [ ]  Mild Protein Calorie Malnutrition        [x]  Moderate Protein Calorie Malnutrition        [ ] Severe Protein Calorie Malnutrition        [ ] Unspecified Protein Calorie Malnutrition        [ ] Underweight / BMI <19        [ ] Morbid Obesity / BMI > 40      Findings:  moderate malnutrition in social issues r/t decreased PO intake 2/2 overly restrictive diet AEB moderate muscle/fat wasting; meeting <75% of estimated nutr needs    Findings as based on:  •  Comprehensive nutrition assessment and consultation  •  Calorie counts (nutrient intake analysis)  •  Food acceptance and intake status from observations by staff  •  Follow up  •  Patient education  •  Intervention secondary to interdisciplinary rounds  •   concerns      Treatment:    The following diet has been recommended:  1) encourage high protein/calorie snacks between meals   2) add MVI with minerals daily to ensure 100% RDI met   3) daily wt checks to track/trend changes    4) consider checking vitamin D level    PROVIDER Section:     By signing this assessment you are acknowledging and agree with the diagnosis/diagnoses assigned by the Registered Dietitian    Comments:

## 2020-08-24 NOTE — PROGRESS NOTE ADULT - ASSESSMENT
87-year-old female with PMHx significant for uterine carcinoma diagnosed back in 1976 s/p radiotherapy, hypothyroidism status post radioactive iodine, atrial fibrillation off anticoagulation due to fall risk with Hx of SAH 1 year ago status now presents with initially mechanical fall followed by near syncopal episode.     1. Fall mechanical - + moderate size anterior pelvis hematoma without Fx with anemia of acute blood loss - series HH- stable, no intervention.    2. L5-S1 stable mass without change from past - needs further evaluation as outpt, unlikely malignant    3. Near-syncope - likely due to formation of hematoma in the settings of acute hemorrhage    4. Afib on BBL with asymptomatic bradycardia and off AC due to Hx of falls and SAH    5. Hypothyroidism - levothyroxine    6. VTE proph - SCD    7. HTN - cont current meds    8. HLD - statin    9. DMII - HISS    10. Reactive leukocytosis - no evidence of infection    Medically stable for DC

## 2020-08-24 NOTE — DISCHARGE NOTE PROVIDER - CARE PROVIDER_API CALL
Robin Walker)  Internal Medicine  [address] [place] [address]  Phone: [phone]  Fax: [phone]  Follow Up Time:
none

## 2020-08-24 NOTE — DISCHARGE NOTE PROVIDER - HOSPITAL COURSE
87-year-old female with PMHx significant for uterine carcinoma diagnosed back in 1976 s/p radiotherapy, hypothyroidism status post radioactive iodine, atrial fibrillation off anticoagulation due to fall risk with Hx of SAH 1 year ago status now presents with initially mechanical fall followed by near syncopal episode. Pt developed moderate size anterior pelvis hematoma without Fx with anemia of acute blood loss - HH remained stable. Pt tolerated PT and medically stable for DC home with home PT. Noted to have moderate protein calorie malnutrition and educated on appropriate diet. Noted to have L5-S1 mas which is w/o change from 2011 - no intervention needed. Pt aslo has asymptomatic bradycardia - will continue current medications. Resume home meds on DC except Aspirin - f/u with PMD and consider to resume ASA if stable in 1-2 weeks.

## 2020-08-24 NOTE — PHYSICAL THERAPY INITIAL EVALUATION ADULT - GENERAL OBSERVATIONS, REHAB EVAL
pt received in bed in CICU. pt cooperative with PT, no complaints. pt ambulated and did stairs. pt left supine in bed, call bell in reach, bed alarm on, NAD.

## 2020-08-24 NOTE — DISCHARGE NOTE PROVIDER - NSDCMRMEDTOKEN_GEN_ALL_CORE_FT
amLODIPine 5 mg oral tablet: 1 tab(s) orally once a day  [place] Thyroid 15 mg oral tablet: 1 tab(s) orally once a day  glipzide: 2.5 milligram(s)  once a day  Metoprolol Tartrate 50 mg oral tablet: 1 tab(s) orally 2 times a day  pioglitazone 15 mg oral tablet: 1 tab(s) orally once a day  ramipril 10 mg oral capsule: 1  orally once a day  simvastatin 20 mg oral tablet: 1 tab(s) orally once a day (at bedtime)  Vitamin D3 1000 intl units oral capsule: 1 cap(s) orally once a day

## 2020-08-24 NOTE — DIETITIAN INITIAL EVALUATION ADULT. - NAME AND PHONE
Emily Lauren MA, RDN, CDN, [facility]  [phone] (office number)  [phone] (pager number)

## 2020-08-24 NOTE — PHYSICAL THERAPY INITIAL EVALUATION ADULT - PERTINENT HX OF CURRENT PROBLEM, REHAB EVAL
87F presents after recent episode of near-syncope and mechanical fall. CT abd and pelvis at [facility] suggested a moderate-size acute hematoma in the anterior pelvis and a 2.3 cm mass involving L5 vertebral body extending into the spinal canal. MRI here at  was compared to prior imaging from 2011 whereby the lesion in the L5 vertebral body appeared to be chronic lesion.

## 2020-08-24 NOTE — PHYSICAL THERAPY INITIAL EVALUATION ADULT - ADDITIONAL COMMENTS
pt lives alone in [facility] level home. reports she owns a SAC but does not use it.  pt reports she plans to stay with her son for at least a week upon discharge from the hospital.

## 2020-08-24 NOTE — DISCHARGE NOTE NURSING/CASE MANAGEMENT/SOCIAL WORK - PATIENT PORTAL LINK FT
You can access the FollowMyHealth Patient Portal offered by [facility] by registering at the following website: [email or web address]. By joining [employer]’s FollowMyHealth portal, you will also be able to view your health information using other applications (apps) compatible with our system.

## 2020-08-24 NOTE — DIETITIAN INITIAL EVALUATION ADULT. - PHYSICAL APPEARANCE
underweight/other (specify) NFPE significant for severe fat wasting; orbital. moderate fat wasting; tricep.  moderate muscle wasting; temporal, clavicle, and scapula.  no edema  no BM  susana score of 17; no PU documented.

## 2020-08-24 NOTE — DIETITIAN INITIAL EVALUATION ADULT. - OTHER INFO
86yo female with PMH significant for Afib, uterine CA, DM, HTN, and MI.  admitted s/p recent fall and found to have abdominal hematoma and L5 soft tissue mass with nerve encroachment with no neurologic symptoms and prior imaging 2011 appears to have similar findings.   Pt is DNR/DNI.   Near-syncope - likely due to formation of hematoma in the settings of acute hemorrhage.      *labs reviewed; A1C 6.2; well controlled.

## 2020-08-24 NOTE — DIETITIAN INITIAL EVALUATION ADULT. - ADD RECOMMEND
1) encourage high protein/calorie snacks between meals 2) add MVI with minerals daily to ensure 100% RDI met 3) daily wt checks to track/trend changes  4) consider checking vitamin D level

## 2020-08-24 NOTE — DIETITIAN INITIAL EVALUATION ADULT. - PERTINENT LABORATORY DATA
08-24 Na138 mmol/L Glu 136 mg/dL<H> K+ 4.3 mmol/L Cr  0.88 mg/dL BUN 20 mg/dL Phos n/a   Alb n/a   PAB n/a

## 2020-08-24 NOTE — PROGRESS NOTE ADULT - SUBJECTIVE AND OBJECTIVE BOX
CC: s/p Fall (23 Aug 2020 09:54)    HPI: 87-year-old female with a past medical history significant for uterine carcinoma diagnosed back in 1976 whereby she was treated with radiotherapy as well as a history of hypothyroidism status post radioactive iodine the patient also has a history of atrial fibrillation presently on full-dose anticoagulation due to fall risk who presented approximately 1 year ago status post fall developed a subarachnoid hemorrhage and now presents after recent episode of near-syncope and mechanical fall.  The patient initially was picked up by EMS and was seen and evaluated at [facility].  While there the patient underwent a CT abdomen and pelvis which suggested the presence of a moderate-size acute hematoma in the anterior pelvis no associated fracture as well as the presence of a 2.3 cm mass involving L5 vertebral body extending into the spinal canal.  The the patient underwent an MRI here at [facility] which was compared to the prior imaging dating back to 2011 whereby the lesion in the L5 vertebral body appeared to be chronic lesion and correlates to the abnormality dating back to 2011.  The patient denies any focal weakness.  She notes the mechanism of fall earlier today was purely based on having had a missed step.  She denies any head trauma.  Also while there she underwent a CT head with no evidence of intracranial bleeding or fractures she also went a cervical spine CT with no traumatic injury noted.  Otherwise she does note that as of recently there have been some recent medication changes associated with her atrial fibrillation she was recently started on amiodarone approximately 4 days ago and subsequently developed dizziness and overall fatigue and malaise.  Denies any recent chest pain or palpitations upon arrival to side as the hospital EKG was consistent with sinus bradycardia.  At the time of today's evaluation patient denies any back pain generally just reports feeling significantly fatigued has a small area of periorbital ecchymosis along the right eye and has no additional complaints she denies any recent fevers chills and corona virus PCR testing was performed and she was noted to be negative. (22 Aug 2020 21:40)    INTERVAL HPI/OVERNIGHT EVENTS: anxious female in the bed without any complaints  Other ROS reviewed and neg     Vital Signs Last 24 Hrs  T(C): 36.7 (24 Aug 2020 10:56), Max: 36.9 (24 Aug 2020 00:12)  T(F): 98 (24 Aug 2020 10:56), Max: 98.5 (24 Aug 2020 00:12)  HR: 50 (24 Aug 2020 12:00) (42 - 65)  BP: 116/48 (24 Aug 2020 12:00) (98/79 - 155/46)  BP(mean): 63 (24 Aug 2020 12:00) (56 - 91)  RR: 17 (24 Aug 2020 12:00) (14 - 23)  SpO2: 94% (24 Aug 2020 12:00) (93% - 99%)  I&O's Detail    23 Aug 2020 07:01  -  24 Aug 2020 07:00  --------------------------------------------------------  IN:  Total IN: 0 mL    OUT:    Voided: 2350 mL  Total OUT: 2350 mL    Total NET: -2350 mL      24 Aug 2020 07:01  -  24 Aug 2020 12:53  --------------------------------------------------------  IN:  Total IN: 0 mL    OUT:    Voided: 900 mL  Total OUT: 900 mL    Total NET: -900 mL                              9.2    10.80 )-----------( 222      ( 24 Aug 2020 06:31 )             28.8     24 Aug 2020 06:31    138    |  107    |  20     ----------------------------<  136    4.3     |  27     |  0.88     Ca    8.4        24 Aug 2020 06:31      PT/INR - ( 22 Aug 2020 13:21 )   PT: 13.2 sec;   INR: 1.10 ratio         PTT - ( 22 Aug 2020 13:21 )  PTT:22.9 sec  CAPILLARY BLOOD GLUCOSE      POCT Blood Glucose.: 205 mg/dL (24 Aug 2020 11:32)  POCT Blood Glucose.: 144 mg/dL (24 Aug 2020 07:30)  POCT Blood Glucose.: 171 mg/dL (23 Aug 2020 21:26)  POCT Blood Glucose.: 110 mg/dL (23 Aug 2020 17:06)      MEDICATIONS  (STANDING):  amLODIPine   Tablet 5 milliGRAM(s) Oral daily  dextrose 5%. 1000 milliLiter(s) (50 mL/Hr) IV Continuous <Continuous>  dextrose 50% Injectable 12.5 Gram(s) IV Push once  dextrose 50% Injectable 25 Gram(s) IV Push once  dextrose 50% Injectable 25 Gram(s) IV Push once  insulin lispro (HumaLOG) corrective regimen sliding scale   SubCutaneous three times a day before meals  levothyroxine 25 MICROGram(s) Oral daily  lisinopril 40 milliGRAM(s) Oral daily  metoprolol tartrate 50 milliGRAM(s) Oral two times a day  simvastatin 20 milliGRAM(s) Oral at bedtime    MEDICATIONS  (PRN):  dextrose 40% Gel 15 Gram(s) Oral once PRN Blood Glucose LESS THAN 70 milliGRAM(s)/deciliter  glucagon  Injectable 1 milliGRAM(s) IntraMuscular once PRN Glucose LESS THAN 70 milligrams/deciliter      RADIOLOGY & ADDITIONAL TESTS: personally visualized    PHYSICAL EXAM:    General: elderly female in no acute distress  Eyes: PERRLA, EOMI; conjunctiva and sclera clear  Head: Normocephalic; atraumatic  ENMT: No nasal discharge; airway clear  Neck: Supple; non tender; no masses  Respiratory: No wheezes, rales or rhonchi  Cardiovascular: S1, S2 irreg  Gastrointestinal: Soft abd, NT, + BS  Genitourinary: No costovertebral angle tenderness  Extremities: No clubbing, cyanosis or edema  Vascular: Peripheral pulses palpable 2+ bilaterally  Neurological: Alert and oriented x4  Skin: Warm and dry.   Musculoskeletal: Normal tone, without deformities  Psychiatric: Cooperative and anxious

## 2020-08-24 NOTE — DIETITIAN INITIAL EVALUATION ADULT. - PERTINENT MEDS FT
MEDICATIONS  (STANDING):  amLODIPine   Tablet 5 milliGRAM(s) Oral daily  dextrose 5%. 1000 milliLiter(s) (50 mL/Hr) IV Continuous <Continuous>  dextrose 50% Injectable 12.5 Gram(s) IV Push once  dextrose 50% Injectable 25 Gram(s) IV Push once  dextrose 50% Injectable 25 Gram(s) IV Push once  insulin lispro (HumaLOG) corrective regimen sliding scale   SubCutaneous three times a day before meals  levothyroxine 25 MICROGram(s) Oral daily  lisinopril 40 milliGRAM(s) Oral daily  metoprolol tartrate 50 milliGRAM(s) Oral two times a day  simvastatin 20 milliGRAM(s) Oral at bedtime    MEDICATIONS  (PRN):  dextrose 40% Gel 15 Gram(s) Oral once PRN Blood Glucose LESS THAN 70 milliGRAM(s)/deciliter  glucagon  Injectable 1 milliGRAM(s) IntraMuscular once PRN Glucose LESS THAN 70 milligrams/deciliter

## 2020-08-24 NOTE — DISCHARGE NOTE PROVIDER - NSDCCPCAREPLAN_GEN_ALL_CORE_FT
PRINCIPAL DISCHARGE DIAGNOSIS  Diagnosis: Fall  Assessment and Plan of Treatment:       SECONDARY DISCHARGE DIAGNOSES  Diagnosis: Near syncope  Assessment and Plan of Treatment:     Diagnosis: Abdominal hematoma  Assessment and Plan of Treatment:

## 2020-08-24 NOTE — DIETITIAN INITIAL EVALUATION ADULT. - MALNUTRITION
moderate malnutrition in social issues r/t decreased PO intake 2/2 overly restrictive diet AEB moderate muscle/fat wasting; meeting <75% of estimated nutr needs moderate malnutrition in social issues

## 2020-08-24 NOTE — DIETITIAN INITIAL EVALUATION ADULT. - FACTORS AFF FOOD INTAKE
other (specify)/3 small portion meals a day. follows very strict low sodium, low sugar, low fat diet.

## 2020-08-27 LAB
CULTURE RESULTS: SIGNIFICANT CHANGE UP
CULTURE RESULTS: SIGNIFICANT CHANGE UP
SPECIMEN SOURCE: SIGNIFICANT CHANGE UP
SPECIMEN SOURCE: SIGNIFICANT CHANGE UP

## 2020-09-08 DIAGNOSIS — Z85.42 PERSONAL HISTORY OF MALIGNANT NEOPLASM OF OTHER PARTS OF UTERUS: ICD-10-CM
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
## 2020-09-08 DIAGNOSIS — E44.0 MODERATE PROTEIN-CALORIE MALNUTRITION: ICD-10-CM

## 2020-09-08 DIAGNOSIS — Z98.890 OTHER SPECIFIED POSTPROCEDURAL STATES: ICD-10-CM
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
## 2020-09-08 DIAGNOSIS — E11.9 TYPE 2 DIABETES MELLITUS WITHOUT COMPLICATIONS: ICD-10-CM

## 2020-09-08 DIAGNOSIS — M19.90 UNSPECIFIED OSTEOARTHRITIS, UNSPECIFIED SITE: ICD-10-CM

## 2020-09-08 DIAGNOSIS — E03.9 HYPOTHYROIDISM, UNSPECIFIED: ICD-10-CM

## 2020-09-08 DIAGNOSIS — Z91.81 HISTORY OF FALLING: ICD-10-CM

## 2020-09-08 DIAGNOSIS — I48.91 UNSPECIFIED ATRIAL FIBRILLATION: ICD-10-CM

## 2020-09-08 DIAGNOSIS — Z92.3 PERSONAL HISTORY OF IRRADIATION: ICD-10-CM

## 2020-09-08 DIAGNOSIS — G95.9 DISEASE OF SPINAL CORD, UNSPECIFIED: ICD-10-CM

## 2020-09-08 DIAGNOSIS — Z90.710 ACQUIRED ABSENCE OF BOTH CERVIX AND UTERUS: ICD-10-CM

## 2020-09-08 DIAGNOSIS — S30.0XXA CONTUSION OF LOWER BACK AND PELVIS, INITIAL ENCOUNTER: ICD-10-CM
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
## 2020-09-08 DIAGNOSIS — Z90.49 ACQUIRED ABSENCE OF OTHER SPECIFIED PARTS OF DIGESTIVE TRACT: ICD-10-CM

## 2020-09-08 DIAGNOSIS — R55 SYNCOPE AND COLLAPSE: ICD-10-CM

## 2020-09-08 DIAGNOSIS — I10 ESSENTIAL (PRIMARY) HYPERTENSION: ICD-10-CM

## 2020-09-08 DIAGNOSIS — Z66 DO NOT RESUSCITATE: ICD-10-CM

## 2020-09-08 DIAGNOSIS — D62 ACUTE POSTHEMORRHAGIC ANEMIA: ICD-10-CM

## 2020-09-08 DIAGNOSIS — I25.2 OLD MYOCARDIAL INFARCTION: ICD-10-CM

## 2020-09-08 DIAGNOSIS — R00.1 BRADYCARDIA, UNSPECIFIED: ICD-10-CM

## 2022-01-10 NOTE — H&P ADULT - DOES THIS PATIENT HAVE A HISTORY OF OR HAS BEEN DX WITH HEART FAILURE?
Nicole Lr was seen in our office today for BP evaluation. Listed below are the patients current meds:      Current Outpatient Medications:     metoprolol succinate (TOPROL-XL) 25 mg XL tablet, Take 1 Tablet by mouth daily. , Disp: 90 Tablet, Rfl: 3    chlorthalidone (HYGROTON) 25 mg tablet, TAKE 1 TABLET BY MOUTH DAILY, Disp: 30 Tablet, Rfl: 5    simvastatin (ZOCOR) 20 mg tablet, TAKE 1 TABLET BY MOUTH AT NIGHT, Disp: 90 Tablet, Rfl: 3    nitroglycerin (NITROSTAT) 0.4 mg SL tablet, 1 Tab by SubLINGual route every five (5) minutes as needed for Chest Pain., Disp: 1 Bottle, Rfl: 2    fluticasone (FLONASE) 50 mcg/actuation nasal spray, nightly., Disp: , Rfl:     montelukast (SINGULAIR) 10 mg tablet, Take 10 mg by mouth daily. , Disp: , Rfl:     multivitamins-minerals-lutein (CENTRUM SILVER) Tab, Take  by mouth., Disp: , Rfl:     aspirin 81 mg tablet, Take  by mouth., Disp: , Rfl:       No current facility-administered medications for this visit. Visit Vitals  /80   Pulse 76   SpO2 96%       Plan:     Patient to continue current medications. Advised patient that I would forward to Dr. Katiuska Holden for review and further instructions. Advised patient that we would call within 24-48 hours with any changes. Patient verbalized understanding.
unknown

## 2022-04-21 NOTE — ED ADULT NURSE NOTE - BREATHING
Patient informed of available non-opioid medications and other non-pharmacological options. Discussed advantages and disadvantages of the alternatives, including whether the patient is at-risk for, or has a history of, controlled substance abuse or misuse, and the patient’s personal preferences. [facility] “Opioid Alternative Pamphlet” was provided to patient. spontaneous

## 2022-09-23 NOTE — OCCUPATIONAL THERAPY INITIAL EVALUATION ADULT - LEVEL OF INDEPENDENCE: DRESS LOWER BODY, OT EVAL
Patient encouraged to schedule hematology follow-up  Patient last understanding and is willing he will call and schedule    Repeat CBC ordered to monitor white blood count donning/doffing socks/independent

## 2022-12-22 NOTE — ED PROVIDER NOTE - DR. NAME
Price (Do Not Change): 0.00
Instructions: This plan will send the code FBSE to the PM system.  DO NOT or CHANGE the price.
Detail Level: Simple
Jason

## 2023-04-21 NOTE — ED ADULT NURSE NOTE - NSSUSCREENINGQ2_ED_ALL_ED
Caller: Pilar Colon    Relationship: Self    Best call back number: [phone]    What is the best time to reach you: ANYTIME    Who are you requesting to speak with (clinical staff, provider,  specific staff member): ALESSIA    What was the call regarding: WOULD LIKE TO DISCUSS APPOINTMENT FOR AN XRAY    Do you require a callback: YES          
Called pt yesterday and told her order was in   
No

## 2023-04-24 NOTE — ED PROVIDER NOTE - DATE/TIME FOR CONVERSATION WITH CONSULTANT:
03-Jun-2019 18:57 [employer] Pregnancy And Lactation Text: This medication is Pregnancy Category B and is considered safe. There is some data to suggest avoiding during the third trimester. It is unknown if this medication is excreted in breast milk.

## 2023-07-20 NOTE — DISCHARGE NOTE PROVIDER - NSDCQMCOGNITION_NEU_ALL_CORE
Cardiology Consult / Navdeep  Chief Complaints:  Shortness of breath    History Of Present Illness  Kat is a 64 year old female with past medical history significant for fibromyalgia, obstructive sleep apnea, asthma presenting with shortness of breath.    Cardiology on consult for volume status assessment.    Patient presented to ED with shortness of breath.  Patient was told by her PCP 1 day prior to arrival that her chest x-ray revealed that she had pneumonia.  Patient is a former smoker.  Arrival to ED, patient afebrile with heart rate 105, respirations 20, blood pressure 122/50, SPO2 93%.  Initial labs significant for sodium 134, glucose 153, hemoglobin 10.7, WBC 23.4, platelet 491, NT-proBNP 2255.     ECG: Sinus tachycardia PACs and with T wave changes    Telemetry reviewed:     High-sensitivity Troponin: neg x1    NTproBNP: 2255    Creatinine: 0.67    Hemoglobin:  9.7    Chest X-ray:  IMPRESSION:   Moderate right basilar infiltrate with associated small right pleural effusion.     Nuclear stress test (2018)  IMPRESSION: Normal myocardial perfusion study.    Past Medical History  Past Medical History:   Diagnosis Date   • Asthma    • Fibromyalgia    • Sleep apnea           Surgical History  History reviewed. No pertinent surgical history.         Social History  Social History     Tobacco Use   • Smoking status: Former     Current packs/day: 1.00     Average packs/day: 1 pack/day for 2.1 years (2.1 ttl pk-yrs)     Types: Cigarettes     Start date: 7/1/2021     Quit date: 7/18/1974   • Smokeless tobacco: Never   Vaping Use   • Vaping Use: never used   Substance Use Topics   • Alcohol use: Never           Family History  History reviewed. No pertinent family history.     Allergies  ALLERGIES:  Patient has no known allergies.    Medications  Medications Prior to Admission   Medication Sig Dispense Refill   • Multiple Vitamins-Minerals (Multivitamin Adults) Tab Take 1 tablet by mouth daily.     •  oxyCODONE-acetaminophen (PERCOCET)  MG per tablet Take 1 tablet by mouth every 6 hours as needed for Pain.     • oxymorphone 40 MG 12 hr tablet Take 40 mg by mouth every 12 hours.     • modafinil (PROVIGIL) 100 MG tablet Take 100 mg by mouth daily.     • meloxicam (MOBIC) 15 MG tablet Take 15 mg by mouth daily.     • budesonide-formoterol (SYMBICORT) 160-4.5 MCG/ACT inhaler Inhale 2 puffs into the lungs in the morning and 2 puffs in the evening.     • busPIRone (BUSPAR) 30 MG tablet TAKE 1 TABLET BY MOUTH IN THE MORNING, 1 TABLET AT NOON AND 1 TABLET IN THE EVENING. PLEASE STOP IF HAVE ANY SIDE EFFECTS LIKE FLUSHING, HEADACHE (Patient taking differently: Take 30 mg by mouth in the morning and 30 mg at noon and 30 mg in the evening.) 270 tablet 0   • risperiDONE (RisperDAL) 2 MG tablet Take 1 tablet by mouth in the morning and 1 tablet in the evening. 180 tablet 1   • divalproex (DEPAKOTE ER) 500 MG 24 hr ER tablet Take 1 tablet by mouth in the morning and 1 tablet in the evening. 2 tablet 0   • clonazePAM (KlonoPIN) 1 MG tablet Take 1 tablet by mouth 2 times daily as needed for Anxiety. (Patient taking differently: Take 1 mg by mouth 2 times daily as needed for Anxiety. Can take both tablets at bedtime if not taken during the day.) 60 tablet 1       Review of Systems  Constitutional: negative unless in noted in HPI above  Eyes:  negative unless in noted in HPI above  Ears,  negative unless in noted in HPI above  Respiratory:  No SOB/CALL. negative unless in noted in HPI above  Cardiovascular:  No chest pain. negative unless in noted in HPI above  Gastrointestinal:  No n/v/diarrhea. negative unless in noted in HPI above  Genitourinary: No hematuria. negative unless in noted in HPI above  Musculoskeletal: negative unless in noted in HPI above  Neurological:  negative unless in noted in HPI above  Behavioral/Psych:  negative unless in noted in HPI above        Last Recorded Vitals  Blood pressure 112/68, pulse 99,  temperature 98.2 °F (36.8 °C), temperature source Oral, resp. rate 19, height 5' 3\" (1.6 m), weight 63.2 kg (139 lb 5.3 oz), SpO2 95 %.    Physical Exam  General appearance - awake, alert, nad  Skin: no rashes, no pallor.  Psych - calm, cooperative, no psychosis  Eyes - conjunctive clear. No eye drainage.   Mouth - mucous membranes moist. No erythema. No oral lesions.  Neck - soft, supple. No thyromegaly. No LAD. No jvd.   Pulmonary - clear to auscultation, breathing non-labored. No w/r/r.  CV -  normal S1, S2, distant. No s3/4. No rub. Non displaced pmi.   GI - bowel sounds present, soft, nontender, nondistended, no hsm.   Neurological - alert, oriented,no focal deficits  Musculoskeletal - perfused and warm, no deformity.  Ext:  No edema             Relevant Results  WBC (K/mcL)   Date Value   07/20/2023 22.3 (H)     RBC (mil/mcL)   Date Value   07/20/2023 3.17 (L)     HCT (%)   Date Value   07/20/2023 29.4 (L)     HGB (g/dL)   Date Value   07/20/2023 9.7 (L)     PLT (K/mcL)   Date Value   07/20/2023 475 (H)     Sodium (mmol/L)   Date Value   07/20/2023 134 (L)     Potassium (mmol/L)   Date Value   07/20/2023 4.4     Chloride (mmol/L)   Date Value   07/20/2023 99     Glucose (mg/dL)   Date Value   07/20/2023 116 (H)     Calcium (mg/dL)   Date Value   07/20/2023 8.7     Carbon Dioxide (mmol/L)   Date Value   07/20/2023 29     BUN (mg/dL)   Date Value   07/20/2023 20     Creatinine (mg/dL)   Date Value   07/20/2023 0.67      No results found for: \"BNP\"     No orders to display                 Assessment & Plan     Patient Active Problem List   Diagnosis   • Bipolar 1 disorder, depressed (CMD)   • ALESIA (generalized anxiety disorder)   • Attention deficit hyperactivity disorder (ADHD), predominantly inattentive type   • Adjustment disorder with anxious mood   • Pneumonia due to organism       64 year old female with past medical history significant for bipolar 1 disorder, generalized anxiety, ADHD, fibromyalgia,  obstructive sleep apnea, asthma presenting with shortness of breath.  Patient on treatment for CAP.  Cardiology on consult for volume status assessment.    Diagnosis:  - Elevated NT proBNP  - Abnormal EKG   - Sinus tachycardia  - Community-acquired pneumonia  - Pleural effusion, right, small    Plan:    Elevated NT proBNP  - Patient appears euvolemic.  NT proBNP elevated to 2255.  Previous chest x-ray without pulmonary vascular congestion.  Chest x-ray.  - Can continue furosemide 20 mg IV daily for now.  Likely does not need diuretics outpatient.  -- Will obtain echocardiogram.     Abnormal EKG  - Patient without ischemic symptoms.  EKG with nonspecific T wave changes anterolateral leads.  - Nuclear stress test (2018) with normal myocardial perfusion study.  -We will defer ischemic eval to outpatient setting at this time.    Code Status    Code Status: Full Resuscitation    Case discussed with Dr. Franci Sethi and plan is pending final approval.     MISA Diaz-BC  Cardiology  Office:  [phone] or [facility]    Patient was seen and examined independently by me.  Agree with above note with my edits.    Franci Sethi M.D.  Cardiology  [phone] (office- [place])     No difficulties

## 2023-09-18 NOTE — ED PROVIDER NOTE - PRINCIPAL DIAGNOSIS
Monitor placed by [address] [address] [address]  Length of monitor 1 week  Monitor ordered by [address]  Activation successful prior to pt leaving office?  SHIPPED TO PATIENT'S HOME
Please get a time on Friday afternoon for us to chat. Thanks.
Please send patient a 1 week monitor. I suggested that she see a former mentor of wilma, Dr. Saintclair Ke at [facility].
Pt states she has been blacking out and passing out. Pt states this has gotten worse and happens everyday. Pt heard about a procedure that does not results in pacemaker. Pt would like [facility] opinion on this. Pt would like appt with K, no open appts for rest of the year. [facility] [address] [email or web address] [place] and would like a recommendation for a doctor there. Please advise.
She can talk at 4 pm on Friday.
Subarachnoid hemorrhage

## 2024-07-29 NOTE — ED PROVIDER NOTE - NSUNITLOCATION_ED_A_ED
Patient wants to find out of she can receive ketamine infusion at [facility] when there are no openings at [facility]. Informed patient that these are 2 different health systems and she will need to discuss this with the ketamine team at [facility] when she sees them next. She will also want to have her port a cath flushed since she last had it flushed in the middle of July. New order placed for line appointment. I will not do any labs at this time unless clinically indicated       ED

## 2025-07-19 NOTE — H&P ADULT - DOES THIS PATIENT HAVE A HISTORY OF OR HAS BEEN DX WITH HEART FAILURE?
Thank You!    It was a pleasure taking care of you in our Emergency Department today. We know that when you come to our Emergency Department, you are entrusting us with your health, comfort, and safety. Our physicians and nurses honor that trust, and truly appreciate the opportunity to care for you and your loved ones.      We also value your feedback. If you receive a survey about your Emergency Department experience today, please fill it out.  We care about our patients' feedback, and we listen to what you have to say.  Thank you.    Erlin Linares MD  ________________________________________________________________________  I have included a copy of your lab results and/or radiologic studies from today's visit so you can have them easily available at your follow-up visit. We hope you feel better and please do not hesitate to contact the ED if you have any questions at all!    No results found for this or any previous visit (from the past 12 hours).  CT HEAD WO CONTRAST   Final Result   CT Head:   1. No evidence of acute intracranial abnormality.      CT Cervical Spine:   1. No evidence of acute fracture.      Electronically signed by Facundo Sanchez      CT CERVICAL SPINE WO CONTRAST   Final Result   CT Head:   1. No evidence of acute intracranial abnormality.      CT Cervical Spine:   1. No evidence of acute fracture.      Electronically signed by Facundo Sanchez      XR TIBIA FIBULA LEFT (2 VIEWS)   Final Result   No acute abnormality.      Electronically signed by Bo Alvarez MD          The exam and treatment you received in the Emergency Department were for an urgent problem and are not intended as complete care. It is important that you follow up with a doctor, nurse practitioner, or physician assistant for ongoing care. If your symptoms become worse or you do not improve as expected and you are unable to reach your usual health care provider, you should return to the Emergency Department. We are  no